# Patient Record
Sex: FEMALE | Race: WHITE | Employment: OTHER | URBAN - METROPOLITAN AREA
[De-identification: names, ages, dates, MRNs, and addresses within clinical notes are randomized per-mention and may not be internally consistent; named-entity substitution may affect disease eponyms.]

---

## 2017-06-08 ENCOUNTER — TRANSFERRED RECORDS (OUTPATIENT)
Dept: HEALTH INFORMATION MANAGEMENT | Facility: CLINIC | Age: 52
End: 2017-06-08

## 2018-01-15 ENCOUNTER — TRANSFERRED RECORDS (OUTPATIENT)
Dept: HEALTH INFORMATION MANAGEMENT | Facility: CLINIC | Age: 53
End: 2018-01-15

## 2018-01-18 ENCOUNTER — TRANSFERRED RECORDS (OUTPATIENT)
Dept: HEALTH INFORMATION MANAGEMENT | Facility: CLINIC | Age: 53
End: 2018-01-18

## 2018-02-23 ENCOUNTER — OFFICE VISIT (OUTPATIENT)
Dept: FAMILY MEDICINE | Facility: CLINIC | Age: 53
End: 2018-02-23
Payer: COMMERCIAL

## 2018-02-23 VITALS
TEMPERATURE: 98.7 F | DIASTOLIC BLOOD PRESSURE: 60 MMHG | HEART RATE: 74 BPM | SYSTOLIC BLOOD PRESSURE: 102 MMHG | BODY MASS INDEX: 27.83 KG/M2 | WEIGHT: 163 LBS | HEIGHT: 64 IN

## 2018-02-23 DIAGNOSIS — G89.29 CHRONIC BILATERAL LOW BACK PAIN WITH RIGHT-SIDED SCIATICA: ICD-10-CM

## 2018-02-23 DIAGNOSIS — G89.4 CHRONIC PAIN SYNDROME: ICD-10-CM

## 2018-02-23 DIAGNOSIS — M54.41 CHRONIC BILATERAL LOW BACK PAIN WITH RIGHT-SIDED SCIATICA: ICD-10-CM

## 2018-02-23 DIAGNOSIS — M54.2 CERVICALGIA: Primary | ICD-10-CM

## 2018-02-23 PROCEDURE — 99205 OFFICE O/P NEW HI 60 MIN: CPT | Performed by: PHYSICIAN ASSISTANT

## 2018-02-23 PROCEDURE — 80306 DRUG TEST PRSMV INSTRMNT: CPT | Performed by: PHYSICIAN ASSISTANT

## 2018-02-23 RX ORDER — AMLODIPINE BESYLATE 10 MG/1
10 TABLET ORAL DAILY
COMMUNITY
Start: 2017-03-09 | End: 2018-03-08

## 2018-02-23 RX ORDER — CYCLOBENZAPRINE HCL 10 MG
10 TABLET ORAL DAILY
COMMUNITY
Start: 2015-04-23 | End: 2018-03-08

## 2018-02-23 RX ORDER — ATORVASTATIN CALCIUM 80 MG/1
80 TABLET, FILM COATED ORAL AT BEDTIME
COMMUNITY
Start: 2017-08-04 | End: 2018-03-08

## 2018-02-23 RX ORDER — ZOLPIDEM TARTRATE 5 MG/1
5 TABLET ORAL AT BEDTIME
Refills: 0 | COMMUNITY
Start: 2018-02-03 | End: 2018-03-08

## 2018-02-23 RX ORDER — METOPROLOL TARTRATE 100 MG
100 TABLET ORAL DAILY
Refills: 0 | COMMUNITY
Start: 2018-02-09 | End: 2018-03-08

## 2018-02-23 RX ORDER — SPIRONOLACTONE 25 MG/1
25 TABLET ORAL DAILY
COMMUNITY
Start: 2017-07-07 | End: 2018-03-08

## 2018-02-23 RX ORDER — NAPROXEN 500 MG/1
500 TABLET ORAL 2 TIMES DAILY
COMMUNITY
Start: 2018-01-05 | End: 2018-03-08

## 2018-02-23 RX ORDER — LOSARTAN POTASSIUM 100 MG/1
100 TABLET ORAL DAILY
COMMUNITY
Start: 2017-08-04 | End: 2018-03-08

## 2018-02-23 RX ORDER — GABAPENTIN 300 MG/1
1200 CAPSULE ORAL 4 TIMES DAILY
COMMUNITY
Start: 2017-09-13 | End: 2018-03-08

## 2018-02-23 RX ORDER — NICOTINE 21 MG/24HR
1 PATCH, TRANSDERMAL 24 HOURS TRANSDERMAL
COMMUNITY
Start: 2016-12-12 | End: 2018-03-08

## 2018-02-23 RX ORDER — OXYCODONE HYDROCHLORIDE 15 MG/1
15 TABLET, FILM COATED, EXTENDED RELEASE ORAL 3 TIMES DAILY
Refills: 0 | COMMUNITY
Start: 2018-02-21 | End: 2018-02-23

## 2018-02-23 RX ORDER — HYDROCODONE BITARTRATE AND ACETAMINOPHEN 10; 325 MG/1; MG/1
10-325 TABLET ORAL
COMMUNITY
Start: 2017-08-25 | End: 2018-02-23

## 2018-02-23 RX ORDER — POLYMYXIN B SULFATE AND TRIMETHOPRIM 1; 10000 MG/ML; [USP'U]/ML
SOLUTION OPHTHALMIC
COMMUNITY
Start: 2017-09-15 | End: 2018-03-08

## 2018-02-23 RX ORDER — DULOXETIN HYDROCHLORIDE 30 MG/1
30 CAPSULE, DELAYED RELEASE ORAL DAILY
COMMUNITY
Start: 2017-07-05 | End: 2018-03-08

## 2018-02-23 RX ORDER — CITALOPRAM HYDROBROMIDE 20 MG/1
20 TABLET ORAL DAILY
COMMUNITY
Start: 2017-07-05 | End: 2018-03-08

## 2018-02-23 RX ORDER — HYDROCODONE BITARTRATE AND ACETAMINOPHEN 10; 325 MG/1; MG/1
1 TABLET ORAL EVERY 4 HOURS PRN
Qty: 90 TABLET | Refills: 0 | Status: SHIPPED | OUTPATIENT
Start: 2018-03-02 | End: 2018-03-08

## 2018-02-23 ASSESSMENT — ANXIETY QUESTIONNAIRES
2. NOT BEING ABLE TO STOP OR CONTROL WORRYING: NOT AT ALL
6. BECOMING EASILY ANNOYED OR IRRITABLE: NOT AT ALL
GAD7 TOTAL SCORE: 3
7. FEELING AFRAID AS IF SOMETHING AWFUL MIGHT HAPPEN: NOT AT ALL
5. BEING SO RESTLESS THAT IT IS HARD TO SIT STILL: SEVERAL DAYS
1. FEELING NERVOUS, ANXIOUS, OR ON EDGE: SEVERAL DAYS
3. WORRYING TOO MUCH ABOUT DIFFERENT THINGS: SEVERAL DAYS

## 2018-02-23 ASSESSMENT — PAIN SCALES - GENERAL: PAINLEVEL: EXTREME PAIN (8)

## 2018-02-23 ASSESSMENT — ENCOUNTER SYMPTOMS
FEVER: 0
WHEEZING: 0
BLURRED VISION: 0
SPUTUM PRODUCTION: 0
SENSORY CHANGE: 0
INSOMNIA: 0
SORE THROAT: 0
WEAKNESS: 0
DIAPHORESIS: 0
NEUROLOGICAL NEGATIVE: 1
NAUSEA: 0
CONSTIPATION: 0
DOUBLE VISION: 0
PHOTOPHOBIA: 0
HEADACHES: 0
HEARTBURN: 0
TINGLING: 0
FREQUENCY: 0
HEMOPTYSIS: 0
DIARRHEA: 0
VOMITING: 0
SEIZURES: 0
LOSS OF CONSCIOUSNESS: 0
DEPRESSION: 0
MYALGIAS: 1
BACK PAIN: 1
BLOOD IN STOOL: 0
FOCAL WEAKNESS: 0
NECK PAIN: 1
HALLUCINATIONS: 0
EYE DISCHARGE: 0
NERVOUS/ANXIOUS: 0
EYE PAIN: 0
WEIGHT LOSS: 0
DYSURIA: 0
DIZZINESS: 0
PALPITATIONS: 0
ORTHOPNEA: 0
ABDOMINAL PAIN: 0
EYE REDNESS: 0
SHORTNESS OF BREATH: 0
COUGH: 0

## 2018-02-23 ASSESSMENT — PATIENT HEALTH QUESTIONNAIRE - PHQ9: 5. POOR APPETITE OR OVEREATING: NOT AT ALL

## 2018-02-23 ASSESSMENT — LIFESTYLE VARIABLES: SUBSTANCE_ABUSE: 0

## 2018-02-23 NOTE — MR AVS SNAPSHOT
After Visit Summary   2/23/2018    Anuradha Gray    MRN: [id number]           Patient Information     Date Of Birth          1965        Visit Information        Provider Department      2/23/2018 1:20 PM Adam Mcdonald PA-C [facility]        Today's Diagnoses     Cervicalgia    -  1    Chronic bilateral low back pain with right-sided sciatica        Chronic pain syndrome           Follow-ups after your visit        Additional Services     PAIN MANAGEMENT REFERRAL       Your provider has referred you to: [facility]: [facility] -    Reason for Referral: Evaluation for comprehensive services- patients will be evaluated if appropriate for comprehensive service including medication changes, procedures, pain psychology, and pain physical therapy.  While involved with comprehensive services, pain providers will work with referring provider/PCP to stabilize appropriate medication management, with long-term plan of transition of prescribing back to referring provider/PCP upon completion of comprehensive services.      Please complete the following questions:    Do you have any specific questions for the pain specialist? No    Are there any red flags that may impact the assessment or management of the patient?       What is your diagnosis for the patient's pain? Neck Pain      For any questions, contact the [facility] at [phone].     **ANY DIAGNOSTIC TESTS THAT ARE NOT IN EPIC SHOULD BE SENT TO THE PAIN CENTER**    REGARDING OPIOID MEDICATIONS:  The discussion of opioids management, appropriateness of therapy, and dosing will be discussed in patients being seen for evaluation.  The pain management clinics are not long-term prescribing clinics, with transition of prescribing of medications ultimately going back to the referring provider/PCP.  If prescribing is taken over at the pain clinic, it is in actively involved patients whom are  appropriate for opioids, urine drug screening is completed, and long-term prescribing plan has been determined.  Therefore, we will not be automatically taking over prescribing at the patient's first visit.  Is this agreeable to you? agrees.     Please be aware that coverage of these services is subject to the terms and limitations of your health insurance plan.  Call member services at your health plan with any benefit or coverage questions.      Please bring the following with you to your appointment:    (1) Any X-Rays, CTs or MRIs which have been performed.  Contact the facility where they were done to arrange for  prior to your scheduled appointment.    (2) List of current medications   (3) This referral request   (4) Any documents/labs given to you for this referral                  Follow-up notes from your care team     Return in about 3 weeks (around 3/16/2018).      Your next 10 appointments already scheduled     Mar 08, 2018  1:20 PM CST   SHORT with Adam Mcdonald PA-C   [facility] ([facility]    [address] [place] [id number] [phone]              Who to contact     If you have questions or need follow up information about today's clinic visit or your schedule please contact [facility] directly at [phone].  Normal or non-critical lab and imaging results will be communicated to you by MyChart, letter or phone within 4 business days after the clinic has received the results. If you do not hear from us within 7 days, please contact the clinic through MyChart or phone. If you have a critical or abnormal lab result, we will notify you by phone as soon as possible.  Submit refill requests through [employer] or call your pharmacy and they will forward the refill request to us. Please allow 3 business days for your refill to be completed.          Additional Information About Your Visit        MyChart Information     [employer]  "lets you send messages to your doctor, view your test results, renew your prescriptions, schedule appointments and more. To sign up, go to [email or web address] . Click on \"Log in\" on the left side of the screen, which will take you to the Welcome page. Then click on \"Sign up Now\" on the right side of the page.     You will be asked to enter the access code listed below, as well as some personal information. Please follow the directions to create your username and password.     Your access code is: [email or web address]  Expires: 2018  3:07 PM     Your access code will  in 90 days. If you need help or a new code, please call your [place] clinic or [phone].        Care EveryWhere ID     This is your Care EveryWhere ID. This could be used by other organizations to access your [place] medical records  [id number]        Your Vitals Were     Pulse Temperature Height BMI (Body Mass Index)          74 98.7  F (37.1  C) (Tympanic) 5' 3.5\" (1.613 m) 28.42 kg/m2         Blood Pressure from Last 3 Encounters:   18 102/60    Weight from Last 3 Encounters:   18 163 lb (73.9 kg)              We Performed the Following     Drug Abuse Screen Panel 13, Urine (Pain Care Package)     PAIN MANAGEMENT REFERRAL          Today's Medication Changes          These changes are accurate as of 18  3:07 PM.  If you have any questions, ask your nurse or doctor.               These medicines have changed or have updated prescriptions.        Dose/Directions    HYDROcodone-acetaminophen  MG per tablet   Commonly known as:  NORCO   This may have changed:    - how much to take  - when to take this  - reasons to take this   Used for:  Cervicalgia, Chronic bilateral low back pain with right-sided sciatica, Chronic pain syndrome   Changed by:  Adam Mcdonald PA-C        Dose:  1 tablet   Start taking on:  3/2/2018   Take 1 tablet by mouth every 4 hours as needed for moderate to severe pain   Quantity:  90 tablet "   Refills:  0            Where to get your medicines      Some of these will need a paper prescription and others can be bought over the counter.  Ask your nurse if you have questions.     Bring a paper prescription for each of these medications     HYDROcodone-acetaminophen  MG per tablet                Primary Care Provider Office Phone # Fax #    Adam Mcdonald PA-C [phone] [address] [place] [id number]        Equal Access to Services     ANNABELLE HATCH : Hadii aad ku hadasho Soomaali, waaxda luqadaha, qaybta kaalmada adeegyada, waxay idiin hayaan adeeg kharash lalenny . So [facility] [phone].    ATENCIÓN: Si habla español, tiene a hollingsworth disposición servicios gratuitos de asistencia lingüística. Llame al [phone].    We comply with applicable federal civil rights laws and [place] laws. We do not discriminate on the basis of race, color, national origin, age, disability, sex, sexual orientation, or gender identity.            Thank you!     Thank you for choosing [facility]  for your care. Our goal is always to provide you with excellent care. Hearing back from our patients is one way we can continue to improve our services. Please take a few minutes to complete the written survey that you may receive in the mail after your visit with us. Thank you!             Your Updated Medication List - Protect others around you: Learn how to safely use, store and throw away your medicines at www.disposemymeds.org.          This list is accurate as of 2/23/18  3:07 PM.  Always use your most recent med list.                   Brand Name Dispense Instructions for use Diagnosis    amLODIPine 10 MG tablet    NORVASC     Take 10 mg by mouth daily        atorvastatin 80 MG tablet    LIPITOR     Take 80 mg by mouth At Bedtime        citalopram 20 MG tablet    celeXA     Take 20 mg by mouth daily        cyclobenzaprine 10 MG tablet    FLEXERIL     Take 10 mg by mouth daily         diclofenac 1 % Gel topical gel    VOLTAREN     APPLY TOPCIALLY TO AFFECTED AREA(S) TWICE DAILY        DULoxetine 30 MG EC capsule    CYMBALTA     Take 30 mg by mouth daily        fa-pyridoxine-cyancobalamin 2.5-25-2 MG Tabs per tablet      Take 1 tablet by mouth daily        gabapentin 300 MG capsule    NEURONTIN     Take 1,200 mg by mouth 4 times daily        HYDROcodone-acetaminophen  MG per tablet   Start taking on:  3/2/2018    NORCO    90 tablet    Take 1 tablet by mouth every 4 hours as needed for moderate to severe pain    Cervicalgia, Chronic bilateral low back pain with right-sided sciatica, Chronic pain syndrome       losartan 100 MG tablet    COZAAR     Take 100 mg by mouth daily        metoprolol tartrate 100 MG tablet    LOPRESSOR     Take 100 mg by mouth daily        naproxen 500 MG tablet    NAPROSYN     Take 500 mg by mouth 2 times daily        nicotine 21 MG/24HR 24 hr patch    NICODERM CQ     1 patch        omeprazole 20 MG CR capsule    priLOSEC     Take 20 mg by mouth daily        Quad Cane Misc      Single Point Cane for home and community use.        RA VITAMIN B-12 TR 1000 MCG Tbcr   Generic drug:  cyanocobalamin      Take 1,000 mcg by mouth daily        spironolactone 25 MG tablet    ALDACTONE     Take 25 mg by mouth daily Take 2 tablets by mouth every morning        trimethoprim-polymyxin b ophthalmic solution    POLYTRIM     INSTILL 1 DROP IN RIGHT EYE EVERY 4 HOURS        zolpidem 5 MG tablet    AMBIEN     Take 5 mg by mouth At Bedtime

## 2018-02-23 NOTE — NURSING NOTE
"Chief Complaint   Patient presents with     New Patient     Pain       Initial /60 (BP Location: Left arm, Patient Position: Sitting, Cuff Size: Adult Regular)  Pulse 74  Temp 98.7  F (37.1  C) (Tympanic)  Ht 5' 3.5\" (1.613 m)  Wt 163 lb (73.9 kg)  BMI 28.42 kg/m2 Estimated body mass index is 28.42 kg/(m^2) as calculated from the following:    Height as of this encounter: 5' 3.5\" (1.613 m).    Weight as of this encounter: 163 lb (73.9 kg).      Health Maintenance that is potentially due pending provider review:  Mammogram, Pap Smear and Colonoscopy/FIT    Will get records and address at next visit.    Is there anyone who you would like to be able to receive your results? No  If yes have patient fill out MINGO    Mica VELASCO CMA    "

## 2018-02-23 NOTE — PROGRESS NOTES
HPI    SUBJECTIVE:   Anuradha Gray is a 52 year old female who presents to clinic today for initial consultation regarding chronic pain.  She has been to a couple of different pain clinics as well as multiple neurosurgery procedures performed and neurology visits.  At age 17 she had scoliosis surgery with Bales rods and had subsequent back and neck problems years after that.  She has had lumbar fusions performed because of ongoing back problems.  She has recently been treated at a pain clinic but unfortunately the doctor that she was seeing no longer practices.  She would like to transfer her cares to this clinic.  I reviewed her past medical records in care everywhere and there is no problems seen such as overuse of medication or other issues.  She has followed through with physical therapy and other treatments in the past.  We had a very lengthy discussion regarding pain management and she would love to get off opiate pain medications if that would work for her.  We discussed the risk of opioid-induced hyperalgesia as well as the strong possibility that that is when she has developed because of the length of time that she has been on pain medications.  She recently was started on OxyContin and she states that this is not helping her pain levels and she would like to discontinue this medication.  She has been on hydrocodone 10/325 for many years and was taking 8 tablets per day but was recently decreased to 6 tablets per day and now 5 tablets per day and they added OxyContin 15 mg 3 times daily.  As I mentioned, she does not think the OxyContin has been beneficial and she would like to discontinue that medication.    New Patient/Transfer of Care  Chronic Pain Initial Visit       Pain location: Spine, Legs, Hips, Arm,and Neck   Pain onset: Ongoing 13 years  Pain is described as Miserable   Pain rating: intensity ranges from 6/10 to 10/10, and Averages 8/10 on a 0-10 scale.  Aggravating factors include:  Continuous Activity, Bending    Relieving factors include: Exercise, Staying Active   Activity level/function:              Daily activities:  Unable to perform most daily activities - chores, hobbies, social activities, driving            Work:  Unable to work  Recent family or social stressors:  none noted    Past pain treatments:   Pain Clinic:  Yes [facility]     PT:  Yes many times   Psychologist:  No  Relaxation techniques/biofeedback:  Yes everyday  Chiropractor:  Yes   Acupuncture:  No  TENs Unit:  Yes   Injections:  Yes   Self-care:  Yes       Previous medication treatments:  Opiates - hydrocodone (Vicodin/Norco), morphine IR, morphine ER (MSContin, Sakina, Avinza, Oromorph), oxycodone IR (Percocet) and oxycodone ER (Oxycontin)  Antiepileptics - none  Antidepressants - amytriptyline, escitalopram (Lexapro), fluoxetine (Prozac) and sertraline (Zoloft)   NSAIDs - diclofenac (Cataflam, Voltaren), ibuprofen (Motrin), ketorolac (Toradol) and naproxen (Anaprox, Naprosyn, Naprelan)  Muscle relaxants - carisoprodol (Soma) and methocarbamol (Robaxin)  Topicals - none    History of chemical dependency:  No  Sleep:  Fair  Depression/mood issues:  No  Anxiety issues:  No      DIRE Total Score(s)  No flowsheet data found.      Amount of exercise or physical activity: None    Problems taking medications regularly: No    Medication side effects: none    Diet: regular (no restrictions)    Problem list and histories reviewed & adjusted, as indicated.  Additional history: as documented    Patient Active Problem List   Diagnosis     Chronic pain     History reviewed. No pertinent surgical history.    Social History   Substance Use Topics     Smoking status: Current Every Day Smoker     Smokeless tobacco: Never Used     Alcohol use Not on file     History reviewed. No pertinent family history.      Current Outpatient Prescriptions   Medication Sig Dispense Refill     fa-pyridoxine-cyancobalamin 2.5-25-2 MG TABS per  tablet Take 1 tablet by mouth daily       spironolactone (ALDACTONE) 25 MG tablet Take 25 mg by mouth daily Take 2 tablets by mouth every morning       Misc. Devices (QUAD CANE) MISC Single Point Cane for home and community use.       metoprolol tartrate (LOPRESSOR) 100 MG tablet Take 100 mg by mouth daily  0     zolpidem (AMBIEN) 5 MG tablet Take 5 mg by mouth At Bedtime  0     losartan (COZAAR) 100 MG tablet Take 100 mg by mouth daily       atorvastatin (LIPITOR) 80 MG tablet Take 80 mg by mouth At Bedtime       DULoxetine (CYMBALTA) 30 MG EC capsule Take 30 mg by mouth daily       citalopram (CELEXA) 20 MG tablet Take 20 mg by mouth daily       cyanocobalamin (RA VITAMIN B-12 TR) 1000 MCG TBCR Take 1,000 mcg by mouth daily       naproxen (NAPROSYN) 500 MG tablet Take 500 mg by mouth 2 times daily       amLODIPine (NORVASC) 10 MG tablet Take 10 mg by mouth daily       cyclobenzaprine (FLEXERIL) 10 MG tablet Take 10 mg by mouth daily       diclofenac (VOLTAREN) 1 % GEL topical gel APPLY TOPCIALLY TO AFFECTED AREA(S) TWICE DAILY       gabapentin (NEURONTIN) 300 MG capsule Take 1,200 mg by mouth 4 times daily       nicotine (NICODERM CQ) 21 MG/24HR 24 hr patch 1 patch       omeprazole (PRILOSEC) 20 MG CR capsule Take 20 mg by mouth daily       [START ON 3/2/2018] HYDROcodone-acetaminophen (NORCO)  MG per tablet Take 1 tablet by mouth every 4 hours as needed for moderate to severe pain 90 tablet 0     trimethoprim-polymyxin b (POLYTRIM) ophthalmic solution INSTILL 1 DROP IN RIGHT EYE EVERY 4 HOURS       Allergies   Allergen Reactions     Lisinopril Cough     Labs reviewed in EPIC    Reviewed and updated as needed this visit by clinical staff  Tobacco  Med Hx  Surg Hx  Fam Hx  Soc Hx      Reviewed and updated as needed this visit by Provider       Review of Systems   Constitutional: Negative for diaphoresis, fever, malaise/fatigue and weight loss.   HENT: Negative for congestion, ear discharge, ear pain,  hearing loss, nosebleeds and sore throat.    Eyes: Negative for blurred vision, double vision, photophobia, pain, discharge and redness.   Respiratory: Negative for cough, hemoptysis, sputum production, shortness of breath and wheezing.    Cardiovascular: Negative for chest pain, palpitations, orthopnea and leg swelling.   Gastrointestinal: Negative for abdominal pain, blood in stool, constipation, diarrhea, heartburn, melena, nausea and vomiting.   Genitourinary: Negative.  Negative for dysuria, frequency and urgency.   Musculoskeletal: Positive for back pain, myalgias and neck pain. Negative for joint pain.   Skin: Negative for itching and rash.   Neurological: Negative.  Negative for dizziness, tingling, sensory change, focal weakness, seizures, loss of consciousness, weakness and headaches.   Endo/Heme/Allergies: Negative.    Psychiatric/Behavioral: Negative for depression, hallucinations, substance abuse and suicidal ideas. The patient is not nervous/anxious and does not have insomnia.          Physical Exam   Constitutional: She is oriented to person, place, and time and well-developed, well-nourished, and in no distress. No distress.   HENT:   Head: Normocephalic and atraumatic.   Right Ear: External ear normal.   Left Ear: External ear normal.   Nose: Nose normal.   Eyes: Conjunctivae and EOM are normal. Pupils are equal, round, and reactive to light. Right eye exhibits no discharge. Left eye exhibits no discharge. No scleral icterus.   Neck: Normal range of motion. Neck supple. No JVD present. No tracheal deviation present. No thyromegaly present.   Cardiovascular: Normal rate, regular rhythm, normal heart sounds and intact distal pulses.  Exam reveals no gallop and no friction rub.    No murmur heard.  Pulmonary/Chest: Effort normal and breath sounds normal. No stridor. No respiratory distress. She has no wheezes. She has no rales. She exhibits no tenderness.   Abdominal: Soft. Bowel sounds are normal. She  exhibits no distension and no mass. There is no tenderness. There is no rebound and no guarding.   Musculoskeletal: She exhibits no edema.        Cervical back: She exhibits decreased range of motion, tenderness, pain and spasm.        Lumbar back: She exhibits decreased range of motion, tenderness, pain and spasm.   Lymphadenopathy:     She has no cervical adenopathy.   Neurological: She is alert and oriented to person, place, and time. She has normal reflexes. No cranial nerve deficit. She exhibits normal muscle tone. Gait normal.   Skin: Skin is warm and dry. No rash noted. She is not diaphoretic. No erythema. No pallor.   Psychiatric: Mood, memory, affect and judgment normal.       (M54.2) Cervicalgia  (primary encounter diagnosis)  Comment:   Plan: PAIN MANAGEMENT REFERRAL,         HYDROcodone-acetaminophen (NORCO)  MG per        tablet            (M54.41,  G89.29) Chronic bilateral low back pain with right-sided sciatica  Comment:   Plan: PAIN MANAGEMENT REFERRAL,         HYDROcodone-acetaminophen (NORCO)  MG per        tablet            (G89.4) Chronic pain syndrome  Comment:   Plan: PAIN MANAGEMENT REFERRAL,         HYDROcodone-acetaminophen (NORCO)  MG per        tablet, Drug Abuse Screen Panel 13, Urine (Pain        Care Package)          65 minutes was spent with patient face to face with greater than 50% of this time spent counseling patient regarding current treatment options etc.    Controlled substance treatment agreement was agreed upon today  Urine drug screen performed today  Referral to pain clinic  Prescription for hydrocodone to be filled next week  Follow-up in 3 weeks

## 2018-02-23 NOTE — LETTER
[facility]    02/23/18    Patient: Anuradha Gray  YOB: 1965  Medical Record Number: [id number]                                                                  Controlled Substance Agreement  I understand that my care provider has prescribed controlled substances (narcotics, tranquilizers, and/or stimulants) to help manage my condition(s).  I am taking this medicine to help me function or work.  I know that this is strong medicine.  It could have serious side effects and even cause a dependency on the drug.  If I stop these medicines suddenly, I could have severe withdrawal symptoms.    The risks, benefits, and side effects of these medication(s) were explained to me.  I agree that:  1. I will take part in other treatments as advised by my provider.  This may be psychiatry or counseling, physical therapy, behavioral therapy, group treatment, or a referral to a pain clinic.  I will reduce or stop my medicine when my provider tells me to do so.   2. I will take my medicines as prescribed.  I will not change the dose or schedule unless my provider tells me to.  There will be no refills if I  run out early.   I may be contacted at any time without warning and asked to complete a drug test or pill count.   3. I will keep all my appointments at the clinic.  If I miss appointments or fail to follow instructions, my provider may stop my medicine.  4. I will not ask other providers to prescribe controlled substances. And I will not accept controlled substances from other people. If I need another prescribed controlled substance for a new reason, I will notify my provider within one business day.  5. If I enroll in the [place] Medical Marijuana program, I will tell my provider.  I will also sign an agreement to share my medical records with my provider.  6. I will use one pharmacy to fill all of my controlled substance prescriptions.  If my prescription is mailed to my pharmacy, it may take  5 to 7 days for my medicine to be ready.  7. I understand that my provider, clinic care team, and pharmacy can track controlled substance prescriptions from other providers through a central database (prescription monitoring program).  8. I will bring in my list of medications (or my medicine bottles) each time I come to the clinic.  REV- 04/2016                                                                                                                                            Page 1 of 2      [facility]    02/23/18    Patient: Anuradha Gray  YOB: 1965  Medical Record Number: [id number]    9. Refills of controlled substances will be made only during office hours.  It is up to me to make sure that I do not run out of my medicines on weekends or holidays.    10. I am responsible for my prescriptions.  If the medicine is lost or stolen, it will not be replaced.   I also agree not to share these medicines with anyone.  11. I agree to not use ANY illegal or recreational drugs.  This includes marijuana, cocaine, bath salts or other drugs.  I agree not to use alcohol unless my provider says I may.  I agree to give urine samples whenever asked.  If I fail to give a urine sample, the provider may stop my medicine.     12. I will tell my nurse or provider right away if I become pregnant or have a new medical problem treated outside of [facility].  13. I understand that this medicine can affect my thinking and judgment.  It may be unsafe for me to drive, use machinery and do dangerous tasks.  I will not do any of these things until I know how the medicine affects me.  If my dose changes, I will wait to see how it affects me.  I will contact my provider if I have concerns about medicine side effects.  I understand that if I do not follow any of the conditions above, my prescriptions or treatment may be stopped.    I agree that my provider, clinic care team, and pharmacy may work with  any city, state or federal law enforcement agency that investigates the misuse, sale, or other diversion of my controlled medicine. I will allow my provider to discuss my care with or share a copy of this agreement with any other treating provider, pharmacy or emergency room where I receive care.  I agree to give up (waive) any right of privacy or confidentiality with respect to these authorizations.   I have read this agreement and have asked questions about anything I did not understand.   ___________________________________    ___________________________  Patient Signature                                                           Date and Time  ___________________________________     ____________________________  Witness                                                                            Date and Time  ___________________________________  Adam Mcdonald PA-C  REV-  04/2016                                                                                                                                                                 Page 2 of 2

## 2018-02-24 ASSESSMENT — ANXIETY QUESTIONNAIRES: GAD7 TOTAL SCORE: 3

## 2018-02-24 ASSESSMENT — PATIENT HEALTH QUESTIONNAIRE - PHQ9: SUM OF ALL RESPONSES TO PHQ QUESTIONS 1-9: 4

## 2018-02-25 ENCOUNTER — HEALTH MAINTENANCE LETTER (OUTPATIENT)
Age: 53
End: 2018-02-25

## 2018-02-26 LAB
AMPHETAMINES UR QL: NOT DETECTED NG/ML
BARBITURATES UR QL SCN: NOT DETECTED NG/ML
BENZODIAZ UR QL SCN: NOT DETECTED NG/ML
BUPRENORPHINE UR QL: NOT DETECTED NG/ML
CANNABINOIDS UR QL: NOT DETECTED NG/ML
COCAINE UR QL SCN: NOT DETECTED NG/ML
D-METHAMPHET UR QL: NOT DETECTED NG/ML
METHADONE UR QL SCN: NOT DETECTED NG/ML
OPIATES UR QL SCN: NOT DETECTED NG/ML
OXYCODONE UR QL SCN: NOT DETECTED NG/ML
PCP UR QL SCN: NOT DETECTED NG/ML
PROPOXYPH UR QL: NOT DETECTED NG/ML
TRICYCLICS UR QL SCN: NOT DETECTED NG/ML

## 2018-03-02 ENCOUNTER — TELEPHONE (OUTPATIENT)
Dept: FAMILY MEDICINE | Facility: CLINIC | Age: 53
End: 2018-03-02

## 2018-03-02 NOTE — TELEPHONE ENCOUNTER
Pt was on 2 oxycodone a day. Then upped to 3 a day. Per the Pain clinic.Pt would like to continue to take 2- Oxycontin a day and then take 6 norco a day as well. Advised not to make any changes to her pain medications until her appointment with Keon Mcdonald. Pt ok to wait. Also please see Drug Screen lab-Pt was negative for all medications-none detected. Mary Jones RN

## 2018-03-02 NOTE — TELEPHONE ENCOUNTER
Reason for call:  Symptom   Symptom or request: medication question about her quantity.    Duration (how long have symptoms been present): saw Keon Mcdonald one week ago and he changed her medication from her pain clinic.  Have you been treated for this before? Yes    Additional comments: She takes Norco and Oxy.  She wants to change how she takes it.  Please advise.    Phone number to reach patient:  Home number on file [phone] (home)    Best Time:  any    Can we leave a detailed message on this number?  YES

## 2018-03-08 ENCOUNTER — OFFICE VISIT (OUTPATIENT)
Dept: FAMILY MEDICINE | Facility: CLINIC | Age: 53
End: 2018-03-08
Payer: COMMERCIAL

## 2018-03-08 VITALS
TEMPERATURE: 97.6 F | DIASTOLIC BLOOD PRESSURE: 76 MMHG | BODY MASS INDEX: 28.84 KG/M2 | WEIGHT: 165.4 LBS | SYSTOLIC BLOOD PRESSURE: 122 MMHG | HEART RATE: 64 BPM

## 2018-03-08 DIAGNOSIS — F17.200 TOBACCO DEPENDENCE SYNDROME: ICD-10-CM

## 2018-03-08 DIAGNOSIS — Z12.31 ENCOUNTER FOR SCREENING MAMMOGRAM FOR BREAST CANCER: ICD-10-CM

## 2018-03-08 DIAGNOSIS — F32.A DEPRESSION, UNSPECIFIED DEPRESSION TYPE: ICD-10-CM

## 2018-03-08 DIAGNOSIS — M54.2 CERVICALGIA: ICD-10-CM

## 2018-03-08 DIAGNOSIS — G89.4 CHRONIC PAIN SYNDROME: Primary | ICD-10-CM

## 2018-03-08 DIAGNOSIS — H10.13 ALLERGIC CONJUNCTIVITIS, BILATERAL: ICD-10-CM

## 2018-03-08 DIAGNOSIS — M54.41 CHRONIC BILATERAL LOW BACK PAIN WITH RIGHT-SIDED SCIATICA: ICD-10-CM

## 2018-03-08 DIAGNOSIS — Z12.11 SPECIAL SCREENING FOR MALIGNANT NEOPLASMS, COLON: ICD-10-CM

## 2018-03-08 DIAGNOSIS — G89.29 CHRONIC BILATERAL LOW BACK PAIN WITH RIGHT-SIDED SCIATICA: ICD-10-CM

## 2018-03-08 DIAGNOSIS — I10 ESSENTIAL HYPERTENSION: ICD-10-CM

## 2018-03-08 DIAGNOSIS — K21.9 GASTROESOPHAGEAL REFLUX DISEASE WITHOUT ESOPHAGITIS: ICD-10-CM

## 2018-03-08 DIAGNOSIS — F51.01 PRIMARY INSOMNIA: ICD-10-CM

## 2018-03-08 PROCEDURE — 80361 OPIATES 1 OR MORE: CPT | Mod: 90 | Performed by: PHYSICIAN ASSISTANT

## 2018-03-08 PROCEDURE — 99215 OFFICE O/P EST HI 40 MIN: CPT | Performed by: PHYSICIAN ASSISTANT

## 2018-03-08 PROCEDURE — 99000 SPECIMEN HANDLING OFFICE-LAB: CPT | Performed by: PHYSICIAN ASSISTANT

## 2018-03-08 PROCEDURE — 80306 DRUG TEST PRSMV INSTRMNT: CPT | Performed by: PHYSICIAN ASSISTANT

## 2018-03-08 RX ORDER — HYDROCODONE BITARTRATE AND ACETAMINOPHEN 10; 325 MG/1; MG/1
1 TABLET ORAL EVERY 4 HOURS PRN
Qty: 90 TABLET | Refills: 0 | Status: SHIPPED | OUTPATIENT
Start: 2018-03-08 | End: 2018-03-08

## 2018-03-08 RX ORDER — GABAPENTIN 300 MG/1
1200 CAPSULE ORAL 3 TIMES DAILY
Qty: 360 CAPSULE | Refills: 3 | Status: SHIPPED | OUTPATIENT
Start: 2018-03-08 | End: 2018-07-26

## 2018-03-08 RX ORDER — CYCLOBENZAPRINE HCL 10 MG
10 TABLET ORAL DAILY
Qty: 42 TABLET | Refills: 3 | Status: SHIPPED | OUTPATIENT
Start: 2018-03-08 | End: 2018-11-01

## 2018-03-08 RX ORDER — SPIRONOLACTONE 25 MG/1
25 TABLET ORAL DAILY
Qty: 30 TABLET | Refills: 3 | Status: SHIPPED | OUTPATIENT
Start: 2018-03-08 | End: 2018-03-08

## 2018-03-08 RX ORDER — CITALOPRAM HYDROBROMIDE 20 MG/1
20 TABLET ORAL DAILY
Qty: 60 TABLET | Refills: 3 | Status: SHIPPED | OUTPATIENT
Start: 2018-03-08 | End: 2018-11-27

## 2018-03-08 RX ORDER — METOPROLOL TARTRATE 100 MG
100 TABLET ORAL DAILY
Qty: 60 TABLET | Refills: 0 | Status: SHIPPED | OUTPATIENT
Start: 2018-03-08 | End: 2018-05-08

## 2018-03-08 RX ORDER — ATORVASTATIN CALCIUM 80 MG/1
80 TABLET, FILM COATED ORAL AT BEDTIME
Qty: 30 TABLET | Refills: 3 | Status: SHIPPED | OUTPATIENT
Start: 2018-03-08 | End: 2019-03-07

## 2018-03-08 RX ORDER — LOSARTAN POTASSIUM 100 MG/1
100 TABLET ORAL DAILY
Qty: 30 TABLET | Refills: 3 | Status: SHIPPED | OUTPATIENT
Start: 2018-03-08 | End: 2018-07-05

## 2018-03-08 RX ORDER — DULOXETIN HYDROCHLORIDE 60 MG/1
60 CAPSULE, DELAYED RELEASE ORAL DAILY
Qty: 30 CAPSULE | Refills: 1 | Status: SHIPPED | OUTPATIENT
Start: 2018-03-08 | End: 2018-05-08

## 2018-03-08 RX ORDER — DULOXETIN HYDROCHLORIDE 30 MG/1
30 CAPSULE, DELAYED RELEASE ORAL DAILY
Qty: 60 CAPSULE | Refills: 3 | Status: SHIPPED | OUTPATIENT
Start: 2018-03-08 | End: 2018-03-08 | Stop reason: DRUGHIGH

## 2018-03-08 RX ORDER — AMLODIPINE BESYLATE 10 MG/1
10 TABLET ORAL DAILY
Qty: 30 TABLET | Refills: 3 | Status: SHIPPED | OUTPATIENT
Start: 2018-03-08 | End: 2018-07-16

## 2018-03-08 RX ORDER — ZOLPIDEM TARTRATE 5 MG/1
5 TABLET ORAL AT BEDTIME
Qty: 60 TABLET | Refills: 0 | Status: SHIPPED | OUTPATIENT
Start: 2018-03-08 | End: 2018-05-08

## 2018-03-08 RX ORDER — NICOTINE 21 MG/24HR
1 PATCH, TRANSDERMAL 24 HOURS TRANSDERMAL EVERY 24 HOURS
Qty: 30 PATCH | Refills: 3 | Status: SHIPPED | OUTPATIENT
Start: 2018-03-08

## 2018-03-08 RX ORDER — POLYMYXIN B SULFATE AND TRIMETHOPRIM 1; 10000 MG/ML; [USP'U]/ML
1 SOLUTION OPHTHALMIC 4 TIMES DAILY
Qty: 1 BOTTLE | Refills: 3 | Status: SHIPPED | OUTPATIENT
Start: 2018-03-08 | End: 2018-07-26

## 2018-03-08 RX ORDER — ZOLPIDEM TARTRATE 5 MG/1
5 TABLET ORAL AT BEDTIME
Qty: 60 TABLET | Refills: 0 | Status: SHIPPED | OUTPATIENT
Start: 2018-03-08 | End: 2018-03-08

## 2018-03-08 RX ORDER — SPIRONOLACTONE 25 MG/1
25 TABLET ORAL DAILY
Qty: 30 TABLET | Refills: 3 | Status: SHIPPED | OUTPATIENT
Start: 2018-03-08 | End: 2018-07-05

## 2018-03-08 RX ORDER — HYDROCODONE BITARTRATE AND ACETAMINOPHEN 10; 325 MG/1; MG/1
1 TABLET ORAL EVERY 4 HOURS PRN
Qty: 180 TABLET | Refills: 0 | Status: SHIPPED | OUTPATIENT
Start: 2018-03-08 | End: 2018-03-28

## 2018-03-08 RX ORDER — NAPROXEN 500 MG/1
500 TABLET ORAL 2 TIMES DAILY
Qty: 60 TABLET | Refills: 3 | Status: SHIPPED | OUTPATIENT
Start: 2018-03-08 | End: 2018-09-01

## 2018-03-08 ASSESSMENT — ENCOUNTER SYMPTOMS
DIZZINESS: 0
EYE DISCHARGE: 0
EYE PAIN: 0
PALPITATIONS: 0
SORE THROAT: 0
HALLUCINATIONS: 0
SPUTUM PRODUCTION: 0
COUGH: 0
TINGLING: 0
WEAKNESS: 0
HEARTBURN: 0
HEADACHES: 0
HEMOPTYSIS: 0
FOCAL WEAKNESS: 0
SEIZURES: 0
EYE REDNESS: 0
MYALGIAS: 1
NAUSEA: 0
VOMITING: 0
SENSORY CHANGE: 0
FEVER: 0
CONSTIPATION: 0
DYSURIA: 0
DIAPHORESIS: 0
ORTHOPNEA: 0
BLURRED VISION: 0
BLOOD IN STOOL: 0
NECK PAIN: 1
LOSS OF CONSCIOUSNESS: 0
DOUBLE VISION: 0
WEIGHT LOSS: 0
NEUROLOGICAL NEGATIVE: 1
ABDOMINAL PAIN: 0
DEPRESSION: 0
INSOMNIA: 0
WHEEZING: 0
DIARRHEA: 0
BACK PAIN: 1
PHOTOPHOBIA: 0
NERVOUS/ANXIOUS: 0
SHORTNESS OF BREATH: 0
FREQUENCY: 0

## 2018-03-08 ASSESSMENT — LIFESTYLE VARIABLES: SUBSTANCE_ABUSE: 0

## 2018-03-08 ASSESSMENT — PAIN SCALES - GENERAL: PAINLEVEL: EXTREME PAIN (8)

## 2018-03-08 NOTE — TELEPHONE ENCOUNTER
I will absolutely not prescribe OxyContin and she needs to see the pain clinic.  There were none of her medications present in her urine drug screen which implies overuse or other problems with medication.  She will need to be seen to discuss this and opiate pain medications are unlikely to be prescribed by me without a new pain clinic evaluation

## 2018-03-08 NOTE — PROGRESS NOTES
HPI    SUBJECTIVE:   Anuradha Gray is a 53 year old female who presents to clinic today for follow-up of her chronic pain management related to her multiple medical issues.  She has neck and back problems.  She has seen a spine specialist and has had multiple injections.  She has been on many different medications in the past and we are recently decreasing and subsequently discontinuing the OxyContin that was recently started adding a pain clinic.  She is currently taking hydrocodone and we discussed the fact that her last urine drug screen did not have any opiate pain medication present.  She states that she had the flu and cannot explain why there was no medication present because she had been taking her medication but does not take it when she drives to appointments.  She has not scheduled an appointment with the pain clinic through [place] yet because they have not called her back.      2 week follow up-Per [place]  Discuss results of urine drug screen  -Patient would like to discuss menopause  -Family history of bi-polar discuss mood swings     Problem list and histories reviewed & adjusted, as indicated.  Additional history: as documented    Patient Active Problem List   Diagnosis     Chronic pain     No past surgical history on file.    Social History   Substance Use Topics     Smoking status: Current Every Day Smoker     Smokeless tobacco: Never Used     Alcohol use Not on file     No family history on file.      Current Outpatient Prescriptions   Medication Sig Dispense Refill     fa-pyridoxine-cyancobalamin 2.5-25-2 MG TABS per tablet Take 1 tablet by mouth daily 30 each 3     metoprolol tartrate (LOPRESSOR) 100 MG tablet Take 1 tablet (100 mg) by mouth daily 60 tablet 0     losartan (COZAAR) 100 MG tablet Take 1 tablet (100 mg) by mouth daily 30 tablet 3     atorvastatin (LIPITOR) 80 MG tablet Take 1 tablet (80 mg) by mouth At Bedtime 30 tablet 3     citalopram (CELEXA) 20 MG tablet Take 1 tablet (20 mg)  by mouth daily 60 tablet 3     cyanocobalamin (RA VITAMIN B-12 TR) 1000 MCG TBCR Take 1,000 mcg by mouth daily 30 tablet 3     naproxen (NAPROSYN) 500 MG tablet Take 1 tablet (500 mg) by mouth 2 times daily 60 tablet 3     amLODIPine (NORVASC) 10 MG tablet Take 1 tablet (10 mg) by mouth daily 30 tablet 3     cyclobenzaprine (FLEXERIL) 10 MG tablet Take 1 tablet (10 mg) by mouth daily 42 tablet 3     diclofenac (VOLTAREN) 1 % GEL topical gel Apply topically 4 times daily 100 g 3     gabapentin (NEURONTIN) 300 MG capsule Take 4 capsules (1,200 mg) by mouth 3 times daily 360 capsule 3     omeprazole (PRILOSEC) 20 MG CR capsule Take 1 capsule (20 mg) by mouth daily 30 capsule 3     trimethoprim-polymyxin b (POLYTRIM) ophthalmic solution Place 1 drop into both eyes 4 times daily 1 Bottle 3     nicotine (NICODERM CQ) 21 MG/24HR 24 hr patch Place 1 patch onto the skin every 24 hours 30 patch 3     zolpidem (AMBIEN) 5 MG tablet Take 1 tablet (5 mg) by mouth At Bedtime 60 tablet 0     HYDROcodone-acetaminophen (NORCO)  MG per tablet Take 1 tablet by mouth every 4 hours as needed for moderate to severe pain 180 tablet 0     DULoxetine (CYMBALTA) 60 MG EC capsule Take 1 capsule (60 mg) by mouth daily 30 capsule 1     spironolactone (ALDACTONE) 25 MG tablet Take 1 tablet (25 mg) by mouth daily 30 tablet 3     Misc. Devices (QUAD CANE) MISC Single Point Cane for home and community use.       [DISCONTINUED] spironolactone (ALDACTONE) 25 MG tablet Take 1 tablet (25 mg) by mouth daily Take 2 tablets by mouth every morning 30 tablet 3     [DISCONTINUED] DULoxetine (CYMBALTA) 30 MG EC capsule Take 1 capsule (30 mg) by mouth daily 60 capsule 3     [DISCONTINUED] spironolactone (ALDACTONE) 25 MG tablet Take 25 mg by mouth daily Take 2 tablets by mouth every morning       [DISCONTINUED] metoprolol tartrate (LOPRESSOR) 100 MG tablet Take 100 mg by mouth daily  0     [DISCONTINUED] losartan (COZAAR) 100 MG tablet Take 100 mg by  mouth daily       [DISCONTINUED] atorvastatin (LIPITOR) 80 MG tablet Take 80 mg by mouth At Bedtime       [DISCONTINUED] DULoxetine (CYMBALTA) 30 MG EC capsule Take 30 mg by mouth daily       [DISCONTINUED] citalopram (CELEXA) 20 MG tablet Take 20 mg by mouth daily       [DISCONTINUED] amLODIPine (NORVASC) 10 MG tablet Take 10 mg by mouth daily       [DISCONTINUED] gabapentin (NEURONTIN) 300 MG capsule Take 1,200 mg by mouth 4 times daily       Allergies   Allergen Reactions     Lisinopril Cough     Labs reviewed in EPIC    Reviewed and updated as needed this visit by clinical staff       Reviewed and updated as needed this visit by Provider       Review of Systems   Constitutional: Negative for diaphoresis, fever, malaise/fatigue and weight loss.   HENT: Negative for congestion, ear discharge, ear pain, hearing loss, nosebleeds and sore throat.    Eyes: Negative for blurred vision, double vision, photophobia, pain, discharge and redness.   Respiratory: Negative for cough, hemoptysis, sputum production, shortness of breath and wheezing.    Cardiovascular: Negative for chest pain, palpitations, orthopnea and leg swelling.   Gastrointestinal: Negative for abdominal pain, blood in stool, constipation, diarrhea, heartburn, melena, nausea and vomiting.   Genitourinary: Negative.  Negative for dysuria, frequency and urgency.   Musculoskeletal: Positive for back pain, joint pain, myalgias and neck pain.   Skin: Negative for itching and rash.   Neurological: Negative.  Negative for dizziness, tingling, sensory change, focal weakness, seizures, loss of consciousness, weakness and headaches.   Endo/Heme/Allergies: Negative.    Psychiatric/Behavioral: Negative for depression, hallucinations, substance abuse and suicidal ideas. The patient is not nervous/anxious and does not have insomnia.          Physical Exam   Constitutional: She is oriented to person, place, and time and well-developed, well-nourished, and in no distress.  No distress.   HENT:   Head: Normocephalic and atraumatic.   Right Ear: External ear normal.   Left Ear: External ear normal.   Nose: Nose normal.   Eyes: Conjunctivae and EOM are normal. Pupils are equal, round, and reactive to light. Right eye exhibits no discharge. Left eye exhibits no discharge. No scleral icterus.   Neck: Normal range of motion. Neck supple. No JVD present. No tracheal deviation present. No thyromegaly present.   Cardiovascular: Normal rate, regular rhythm, normal heart sounds and intact distal pulses.  Exam reveals no gallop and no friction rub.    No murmur heard.  Pulmonary/Chest: Effort normal and breath sounds normal. No stridor. No respiratory distress. She has no wheezes. She has no rales. She exhibits no tenderness.   Abdominal: Soft. Bowel sounds are normal. She exhibits no distension and no mass. There is no tenderness. There is no rebound and no guarding.   Musculoskeletal: She exhibits no edema.        Cervical back: She exhibits decreased range of motion, tenderness, pain and spasm.        Lumbar back: She exhibits decreased range of motion, tenderness, pain and spasm.   Lymphadenopathy:     She has no cervical adenopathy.   Neurological: She is alert and oriented to person, place, and time. She has normal reflexes. No cranial nerve deficit. She exhibits normal muscle tone. Gait normal.   Skin: Skin is warm and dry. No rash noted. She is not diaphoretic. No erythema. No pallor.   Psychiatric: Mood, memory, affect and judgment normal.             (G89.4) Chronic pain syndrome  (primary encounter diagnosis)  Comment:   Plan: Drug Abuse Screen Panel 13, Urine (Pain Care         Package), fa-pyridoxine-cyancobalamin 2.5-25-2         MG TABS per tablet, metoprolol tartrate         (LOPRESSOR) 100 MG tablet, losartan (COZAAR)         100 MG tablet, atorvastatin (LIPITOR) 80 MG         tablet, citalopram (CELEXA) 20 MG tablet,         cyanocobalamin (RA VITAMIN B-12 TR) 1000 MCG          TBCR, naproxen (NAPROSYN) 500 MG tablet,         amLODIPine (NORVASC) 10 MG tablet,         cyclobenzaprine (FLEXERIL) 10 MG tablet,         diclofenac (VOLTAREN) 1 % GEL topical gel,         gabapentin (NEURONTIN) 300 MG capsule,         omeprazole (PRILOSEC) 20 MG CR capsule,         trimethoprim-polymyxin b (POLYTRIM) ophthalmic         solution, nicotine (NICODERM CQ) 21 MG/24HR 24         hr patch, *MA Screening Digital Bilateral,         GASTROENTEROLOGY ADULT REF PROCEDURE ONLY,         HYDROcodone-acetaminophen (NORCO)  MG per        tablet, PAIN MANAGEMENT REFERRAL, DULoxetine         (CYMBALTA) 60 MG EC capsule, spironolactone         (ALDACTONE) 25 MG tablet, DISCONTINUED:         spironolactone (ALDACTONE) 25 MG tablet,         DISCONTINUED: zolpidem (AMBIEN) 5 MG tablet,         DISCONTINUED: DULoxetine (CYMBALTA) 30 MG EC         capsule, DISCONTINUED:         HYDROcodone-acetaminophen (NORCO)  MG per        tablet, DISCONTINUED: HYDROcodone-acetaminophen        (NORCO)  MG per tablet            (M54.2) Cervicalgia  Comment:  Plan: Drug Abuse Screen Panel 13, Urine (Pain Care         Package), fa-pyridoxine-cyancobalamin 2.5-25-2         MG TABS per tablet, metoprolol tartrate         (LOPRESSOR) 100 MG tablet, losartan (COZAAR)         100 MG tablet, atorvastatin (LIPITOR) 80 MG         tablet, citalopram (CELEXA) 20 MG tablet,         cyanocobalamin (RA VITAMIN B-12 TR) 1000 MCG         TBCR, naproxen (NAPROSYN) 500 MG tablet,         amLODIPine (NORVASC) 10 MG tablet,         cyclobenzaprine (FLEXERIL) 10 MG tablet,         diclofenac (VOLTAREN) 1 % GEL topical gel,         gabapentin (NEURONTIN) 300 MG capsule,         omeprazole (PRILOSEC) 20 MG CR capsule,         trimethoprim-polymyxin b (POLYTRIM) ophthalmic         solution, nicotine (NICODERM CQ) 21 MG/24HR 24         hr patch, *MA Screening Digital Bilateral,         GASTROENTEROLOGY ADULT REF PROCEDURE ONLY,          HYDROcodone-acetaminophen (NORCO)  MG per        tablet, PAIN MANAGEMENT REFERRAL, DULoxetine         (CYMBALTA) 60 MG EC capsule, spironolactone         (ALDACTONE) 25 MG tablet, DISCONTINUED:         spironolactone (ALDACTONE) 25 MG tablet,         DISCONTINUED: zolpidem (AMBIEN) 5 MG tablet,         DISCONTINUED: DULoxetine (CYMBALTA) 30 MG EC         capsule, DISCONTINUED:         HYDROcodone-acetaminophen (NORCO)  MG per        tablet, DISCONTINUED: HYDROcodone-acetaminophen        (NORCO)  MG per tablet            (M54.41,  G89.29) Chronic bilateral low back pain with right-sided sciatica  Comment:   Plan: Drug Abuse Screen Panel 13, Urine (Pain Care         Package), fa-pyridoxine-cyancobalamin 2.5-25-2         MG TABS per tablet, metoprolol tartrate         (LOPRESSOR) 100 MG tablet, losartan (COZAAR)         100 MG tablet, atorvastatin (LIPITOR) 80 MG         tablet, citalopram (CELEXA) 20 MG tablet,         cyanocobalamin (RA VITAMIN B-12 TR) 1000 MCG         TBCR, naproxen (NAPROSYN) 500 MG tablet,         amLODIPine (NORVASC) 10 MG tablet,         cyclobenzaprine (FLEXERIL) 10 MG tablet,         diclofenac (VOLTAREN) 1 % GEL topical gel,         gabapentin (NEURONTIN) 300 MG capsule,         omeprazole (PRILOSEC) 20 MG CR capsule,         trimethoprim-polymyxin b (POLYTRIM) ophthalmic         solution, nicotine (NICODERM CQ) 21 MG/24HR 24         hr patch, *MA Screening Digital Bilateral,         GASTROENTEROLOGY ADULT REF PROCEDURE ONLY,         HYDROcodone-acetaminophen (NORCO)  MG per        tablet, PAIN MANAGEMENT REFERRAL, DULoxetine         (CYMBALTA) 60 MG EC capsule, spironolactone         (ALDACTONE) 25 MG tablet, DISCONTINUED:         spironolactone (ALDACTONE) 25 MG tablet,         DISCONTINUED: zolpidem (AMBIEN) 5 MG tablet,         DISCONTINUED: DULoxetine (CYMBALTA) 30 MG EC         capsule, DISCONTINUED:         HYDROcodone-acetaminophen (NORCO)  MG per         tablet, DISCONTINUED: HYDROcodone-acetaminophen        (NORCO)  MG per tablet            (Z12.31) Encounter for screening mammogram for breast cancer  Comment:   Plan: Drug Abuse Screen Panel 13, Urine (Pain Care         Package), fa-pyridoxine-cyancobalamin 2.5-25-2         MG TABS per tablet, metoprolol tartrate         (LOPRESSOR) 100 MG tablet, losartan (COZAAR)         100 MG tablet, atorvastatin (LIPITOR) 80 MG         tablet, citalopram (CELEXA) 20 MG tablet,         cyanocobalamin (RA VITAMIN B-12 TR) 1000 MCG         TBCR, naproxen (NAPROSYN) 500 MG tablet,         amLODIPine (NORVASC) 10 MG tablet,         cyclobenzaprine (FLEXERIL) 10 MG tablet,         diclofenac (VOLTAREN) 1 % GEL topical gel,         gabapentin (NEURONTIN) 300 MG capsule,         omeprazole (PRILOSEC) 20 MG CR capsule,         trimethoprim-polymyxin b (POLYTRIM) ophthalmic         solution, nicotine (NICODERM CQ) 21 MG/24HR 24         hr patch, *MA Screening Digital Bilateral,         GASTROENTEROLOGY ADULT REF PROCEDURE ONLY,         spironolactone (ALDACTONE) 25 MG tablet,         DISCONTINUED: spironolactone (ALDACTONE) 25 MG         tablet, DISCONTINUED: zolpidem (AMBIEN) 5 MG         tablet, DISCONTINUED: DULoxetine (CYMBALTA) 30         MG EC capsule, DISCONTINUED:         HYDROcodone-acetaminophen (NORCO)  MG per        tablet            (Z12.11) Special screening for malignant neoplasms, colon  Comment:   Plan: Drug Abuse Screen Panel 13, Urine (Pain Care         Package), fa-pyridoxine-cyancobalamin 2.5-25-2         MG TABS per tablet, metoprolol tartrate         (LOPRESSOR) 100 MG tablet, losartan (COZAAR)         100 MG tablet, atorvastatin (LIPITOR) 80 MG         tablet, citalopram (CELEXA) 20 MG tablet,         cyanocobalamin (RA VITAMIN B-12 TR) 1000 MCG         TBCR, naproxen (NAPROSYN) 500 MG tablet,         amLODIPine (NORVASC) 10 MG tablet,         cyclobenzaprine (FLEXERIL) 10 MG tablet,          diclofenac (VOLTAREN) 1 % GEL topical gel,         gabapentin (NEURONTIN) 300 MG capsule,         omeprazole (PRILOSEC) 20 MG CR capsule,         trimethoprim-polymyxin b (POLYTRIM) ophthalmic         solution, nicotine (NICODERM CQ) 21 MG/24HR 24         hr patch, *MA Screening Digital Bilateral,         GASTROENTEROLOGY ADULT REF PROCEDURE ONLY,         spironolactone (ALDACTONE) 25 MG tablet,         DISCONTINUED: spironolactone (ALDACTONE) 25 MG         tablet, DISCONTINUED: zolpidem (AMBIEN) 5 MG         tablet, DISCONTINUED: DULoxetine (CYMBALTA) 30         MG EC capsule, DISCONTINUED:         HYDROcodone-acetaminophen (NORCO)  MG per        tablet            (I10) Essential hypertension  Comment:   Plan: Drug Abuse Screen Panel 13, Urine (Pain Care         Package), fa-pyridoxine-cyancobalamin 2.5-25-2         MG TABS per tablet, metoprolol tartrate         (LOPRESSOR) 100 MG tablet, losartan (COZAAR)         100 MG tablet, atorvastatin (LIPITOR) 80 MG         tablet, citalopram (CELEXA) 20 MG tablet,         cyanocobalamin (RA VITAMIN B-12 TR) 1000 MCG         TBCR, naproxen (NAPROSYN) 500 MG tablet,         amLODIPine (NORVASC) 10 MG tablet,         cyclobenzaprine (FLEXERIL) 10 MG tablet,         diclofenac (VOLTAREN) 1 % GEL topical gel,         gabapentin (NEURONTIN) 300 MG capsule,         omeprazole (PRILOSEC) 20 MG CR capsule,         trimethoprim-polymyxin b (POLYTRIM) ophthalmic         solution, nicotine (NICODERM CQ) 21 MG/24HR 24         hr patch, *MA Screening Digital Bilateral,         GASTROENTEROLOGY ADULT REF PROCEDURE ONLY,         spironolactone (ALDACTONE) 25 MG tablet,         DISCONTINUED: spironolactone (ALDACTONE) 25 MG         tablet, DISCONTINUED: zolpidem (AMBIEN) 5 MG         tablet, DISCONTINUED: DULoxetine (CYMBALTA) 30         MG EC capsule, DISCONTINUED:         HYDROcodone-acetaminophen (NORCO)  MG per        tablet           (F51.01) Primary insomnia  Comment:    Plan: Drug Abuse Screen Panel 13, Urine (Pain Care         Package), fa-pyridoxine-cyancobalamin 2.5-25-2         MG TABS per tablet, metoprolol tartrate         (LOPRESSOR) 100 MG tablet, losartan (COZAAR)         100 MG tablet, atorvastatin (LIPITOR) 80 MG         tablet, citalopram (CELEXA) 20 MG tablet,         cyanocobalamin (RA VITAMIN B-12 TR) 1000 MCG         TBCR, naproxen (NAPROSYN) 500 MG tablet,         amLODIPine (NORVASC) 10 MG tablet,         cyclobenzaprine (FLEXERIL) 10 MG tablet,         diclofenac (VOLTAREN) 1 % GEL topical gel,         gabapentin (NEURONTIN) 300 MG capsule,         omeprazole (PRILOSEC) 20 MG CR capsule,         trimethoprim-polymyxin b (POLYTRIM) ophthalmic         solution, nicotine (NICODERM CQ) 21 MG/24HR 24         hr patch, *MA Screening Digital Bilateral,         GASTROENTEROLOGY ADULT REF PROCEDURE ONLY,         zolpidem (AMBIEN) 5 MG tablet, spironolactone         (ALDACTONE) 25 MG tablet, DISCONTINUED:         spironolactone (ALDACTONE) 25 MG tablet,         DISCONTINUED: zolpidem (AMBIEN) 5 MG tablet,         DISCONTINUED: DULoxetine (CYMBALTA) 30 MG EC         capsule, DISCONTINUED:         HYDROcodone-acetaminophen (NORCO)  MG per        tablet            (F32.9) Depression, unspecified depression type  Comment:   Plan: Drug Abuse Screen Panel 13, Urine (Pain Care         Package), fa-pyridoxine-cyancobalamin 2.5-25-2         MG TABS per tablet, metoprolol tartrate         (LOPRESSOR) 100 MG tablet, losartan (COZAAR)         100 MG tablet, atorvastatin (LIPITOR) 80 MG         tablet, citalopram (CELEXA) 20 MG tablet,         cyanocobalamin (RA VITAMIN B-12 TR) 1000 MCG         TBCR, naproxen (NAPROSYN) 500 MG tablet,         amLODIPine (NORVASC) 10 MG tablet,         cyclobenzaprine (FLEXERIL) 10 MG tablet,         diclofenac (VOLTAREN) 1 % GEL topical gel,         gabapentin (NEURONTIN) 300 MG capsule,         omeprazole (PRILOSEC) 20 MG CR capsule,          trimethoprim-polymyxin b (POLYTRIM) ophthalmic         solution, nicotine (NICODERM CQ) 21 MG/24HR 24         hr patch, *MA Screening Digital Bilateral,         GASTROENTEROLOGY ADULT REF PROCEDURE ONLY,         MENTAL HEALTH REFERRAL  - Adult; Assessments         and Testing; General Psychological Testing;         [facility] [place] [facility] [phone]; We will contact you to schedule the         appointment or please call with any questions,         spironolactone (ALDACTONE) 25 MG tablet,         DISCONTINUED: spironolactone (ALDACTONE) 25 MG         tablet, DISCONTINUED: zolpidem (AMBIEN) 5 MG         tablet, DISCONTINUED: DULoxetine (CYMBALTA) 30         MG EC capsule, DISCONTINUED:         HYDROcodone-acetaminophen (NORCO)  MG per        tablet            (K21.9) Gastroesophageal reflux disease without esophagitis  Comment:   Plan: Drug Abuse Screen Panel 13, Urine (Pain Care         Package), fa-pyridoxine-cyancobalamin 2.5-25-2         MG TABS per tablet, metoprolol tartrate         (LOPRESSOR) 100 MG tablet, losartan (COZAAR)         100 MG tablet, atorvastatin (LIPITOR) 80 MG         tablet, citalopram (CELEXA) 20 MG tablet,         cyanocobalamin (RA VITAMIN B-12 TR) 1000 MCG         TBCR, naproxen (NAPROSYN) 500 MG tablet,         amLODIPine (NORVASC) 10 MG tablet,         cyclobenzaprine (FLEXERIL) 10 MG tablet,         diclofenac (VOLTAREN) 1 % GEL topical gel,         gabapentin (NEURONTIN) 300 MG capsule,         omeprazole (PRILOSEC) 20 MG CR capsule,         trimethoprim-polymyxin b (POLYTRIM) ophthalmic         solution, nicotine (NICODERM CQ) 21 MG/24HR 24         hr patch, *MA Screening Digital Bilateral,         GASTROENTEROLOGY ADULT REF PROCEDURE ONLY,         spironolactone (ALDACTONE) 25 MG tablet,         DISCONTINUED: spironolactone (ALDACTONE) 25 MG         tablet, DISCONTINUED: zolpidem (AMBIEN) 5 MG         tablet, DISCONTINUED: DULoxetine (CYMBALTA) 30          MG EC capsule, DISCONTINUED:         HYDROcodone-acetaminophen (NORCO)  MG per        tablet            (H10.13) Allergic conjunctivitis, bilateral  Comment:   Plan: Drug Abuse Screen Panel 13, Urine (Pain Care         Package), fa-pyridoxine-cyancobalamin 2.5-25-2         MG TABS per tablet, metoprolol tartrate         (LOPRESSOR) 100 MG tablet, losartan (COZAAR)         100 MG tablet, atorvastatin (LIPITOR) 80 MG         tablet, citalopram (CELEXA) 20 MG tablet,         cyanocobalamin (RA VITAMIN B-12 TR) 1000 MCG         TBCR, naproxen (NAPROSYN) 500 MG tablet,         amLODIPine (NORVASC) 10 MG tablet,         cyclobenzaprine (FLEXERIL) 10 MG tablet,         diclofenac (VOLTAREN) 1 % GEL topical gel,         gabapentin (NEURONTIN) 300 MG capsule,         omeprazole (PRILOSEC) 20 MG CR capsule,         trimethoprim-polymyxin b (POLYTRIM) ophthalmic         solution, nicotine (NICODERM CQ) 21 MG/24HR 24         hr patch, *MA Screening Digital Bilateral,         GASTROENTEROLOGY ADULT REF PROCEDURE ONLY,         spironolactone (ALDACTONE) 25 MG tablet,         DISCONTINUED: spironolactone (ALDACTONE) 25 MG         tablet, DISCONTINUED: zolpidem (AMBIEN) 5 MG         tablet, DISCONTINUED: DULoxetine (CYMBALTA) 30         MG EC capsule, DISCONTINUED:         HYDROcodone-acetaminophen (NORCO)  MG per        tablet            (F17.200) Tobacco dependence syndrome  Comment:   Plan: Drug Abuse Screen Panel 13, Urine (Pain Care         Package), fa-pyridoxine-cyancobalamin 2.5-25-2         MG TABS per tablet, metoprolol tartrate         (LOPRESSOR) 100 MG tablet, losartan (COZAAR)         100 MG tablet, atorvastatin (LIPITOR) 80 MG         tablet, citalopram (CELEXA) 20 MG tablet,         cyanocobalamin (RA VITAMIN B-12 TR) 1000 MCG         TBCR, naproxen (NAPROSYN) 500 MG tablet,         amLODIPine (NORVASC) 10 MG tablet,         cyclobenzaprine (FLEXERIL) 10 MG tablet,         diclofenac (VOLTAREN) 1 % GEL  topical gel,         gabapentin (NEURONTIN) 300 MG capsule,         omeprazole (PRILOSEC) 20 MG CR capsule,         trimethoprim-polymyxin b (POLYTRIM) ophthalmic         solution, nicotine (NICODERM CQ) 21 MG/24HR 24         hr patch, *MA Screening Digital Bilateral,         GASTROENTEROLOGY ADULT REF PROCEDURE ONLY,         spironolactone (ALDACTONE) 25 MG tablet,         DISCONTINUED: spironolactone (ALDACTONE) 25 MG         tablet, DISCONTINUED: zolpidem (AMBIEN) 5 MG         tablet, DISCONTINUED: DULoxetine (CYMBALTA) 30         MG EC capsule, DISCONTINUED:         HYDROcodone-acetaminophen (NORCO)  MG per        tablet         47 minuteswas spent with patient with greater than 50% of this time spent counseling patient regarding current treatment options etc.      Urine drug screen was repeated today.

## 2018-03-08 NOTE — MR AVS SNAPSHOT
After Visit Summary   3/8/2018    Anuradha Gray    MRN: [id number]           Patient Information     Date Of Birth          1965        Visit Information        Provider Department      3/8/2018 1:20 PM Adam Mcdonald PA-C [facility]        Today's Diagnoses     Chronic pain syndrome    -  1    Cervicalgia        Chronic bilateral low back pain with right-sided sciatica        Encounter for screening mammogram for breast cancer        Special screening for malignant neoplasms, colon        Essential hypertension        Primary insomnia        Depression, unspecified depression type        Gastroesophageal reflux disease without esophagitis        Allergic conjunctivitis, bilateral        Tobacco dependence syndrome           Follow-ups after your visit        Additional Services     GASTROENTEROLOGY ADULT REF PROCEDURE ONLY       Last Lab Result: No results found for: CR  Body mass index is 28.84 kg/(m^2).     Needed:  No  Language:  English    Patient will be contacted to schedule procedure.     Please be aware that coverage of these services is subject to the terms and limitations of your health insurance plan.  Call member services at your health plan with any benefit or coverage questions.  Any procedures must be performed at a [place] facility OR coordinated by your clinic's referral office.    Please bring the following with you to your appointment:    (1) Any X-Rays, CTs or MRIs which have been performed.  Contact the facility where they were done to arrange for  prior to your scheduled appointment.    (2) List of current medications   (3) This referral request   (4) Any documents/labs given to you for this referral            MENTAL HEALTH REFERRAL  - Adult; Assessments and Testing; General Psychological Testing; [facility] [place] [facility] to the [facility]: [facility] [phone]; We will contact you to schedule the appointment or please call with any questions        All scheduling is subject to the client's specific insurance plan & benefits, provider/location availability, and provider clinical specialities.  Please arrive 15 minutes early for your first appointment and bring your completed paperwork.    Please be aware that coverage of these services is subject to the terms and limitations of your health insurance plan.  Call member services at your health plan with any benefit or coverage questions.                      PAIN MANAGEMENT REFERRAL       Your provider has referred you to: [facility]: [facility] -    Reason for Referral: Consult-only- patient seen for one-time evaluation to provide recommendations back to referring provider.      Please complete the following questions:    Do you have any specific questions for the pain specialist? Yes:     Are there any red flags that may impact the assessment or management of the patient?       What is your diagnosis for the patient's pain? Chronic pain        For any questions, contact the [facility] at [phone].     **ANY DIAGNOSTIC TESTS THAT ARE NOT IN EPIC SHOULD BE SENT TO THE PAIN CENTER**    REGARDING OPIOID MEDICATIONS:  The discussion of opioids management, appropriateness of therapy, and dosing will be discussed in patients being seen for evaluation.  The pain management clinics are not long-term prescribing clinics, with transition of prescribing of medications ultimately going back to the referring provider/PCP.  If prescribing is taken over at the pain clinic, it is in actively involved patients whom are appropriate for opioids, urine drug screening is completed, and long-term prescribing plan has been determined.  Therefore, we will not be automatically taking over prescribing at the patient's first visit.  Is this agreeable to you? agrees.     Please be aware that coverage of these services is subject to the terms and limitations of your health insurance plan.  Call  "member services at your health plan with any benefit or coverage questions.      Please bring the following with you to your appointment:    (1) Any X-Rays, CTs or MRIs which have been performed.  Contact the facility where they were done to arrange for  prior to your scheduled appointment.    (2) List of current medications   (3) This referral request   (4) Any documents/labs given to you for this referral                  Future tests that were ordered for you today     Open Future Orders        Priority Expected Expires Ordered    *MA Screening Digital Bilateral Routine  3/8/2019 3/8/2018            Who to contact     If you have questions or need follow up information about today's clinic visit or your schedule please contact [facility] directly at [phone].  Normal or non-critical lab and imaging results will be communicated to you by DescribeMehart, letter or phone within 4 business days after the clinic has received the results. If you do not hear from us within 7 days, please contact the clinic through DescribeMehart or phone. If you have a critical or abnormal lab result, we will notify you by phone as soon as possible.  Submit refill requests through Taglocity or call your pharmacy and they will forward the refill request to us. Please allow 3 business days for your refill to be completed.          Additional Information About Your Visit        Taglocity Information     Taglocity lets you send messages to your doctor, view your test results, renew your prescriptions, schedule appointments and more. To sign up, go to [email or web address] . Click on \"Log in\" on the left side of the screen, which will take you to the Welcome page. Then click on \"Sign up Now\" on the right side of the page.     You will be asked to enter the access code listed below, as well as some personal information. Please follow the directions to create your username and password.     Your access code is: " [email or web address]  Expires: 2018  3:07 PM     Your access code will  in 90 days. If you need help or a new code, please call your [place] clinic or [phone].        Care EveryWhere ID     This is your Care EveryWhere ID. This could be used by other organizations to access your [place] medical records  [id number]        Your Vitals Were     Pulse Temperature BMI (Body Mass Index)             64 97.6  F (36.4  C) (Tympanic) 28.84 kg/m2          Blood Pressure from Last 3 Encounters:   18 122/76   18 102/60    Weight from Last 3 Encounters:   18 165 lb 6.4 oz (75 kg)   18 163 lb (73.9 kg)              We Performed the Following     Drug Abuse Screen Panel 13, Urine (Pain Care Package)     GASTROENTEROLOGY ADULT REF PROCEDURE ONLY     MENTAL HEALTH REFERRAL  - Adult; Assessments and Testing; General Psychological Testing; G: [facility] [phone]; We will contact you to schedule the appointment or please call with any questions     PAIN MANAGEMENT REFERRAL          Today's Medication Changes          These changes are accurate as of 3/8/18  2:09 PM.  If you have any questions, ask your nurse or doctor.               Start taking these medicines.        Dose/Directions    HYDROcodone-acetaminophen  MG per tablet   Commonly known as:  NORCO   Used for:  Cervicalgia, Chronic bilateral low back pain with right-sided sciatica, Chronic pain syndrome   Started by:  Adam Mcdonald PA-C        Dose:  1 tablet   Take 1 tablet by mouth every 4 hours as needed for moderate to severe pain   Quantity:  180 tablet   Refills:  0       zolpidem 5 MG tablet   Commonly known as:  AMBIEN   Used for:  Primary insomnia   Started by:  Adam Mcdonald PA-C        Dose:  5 mg   Take 1 tablet (5 mg) by mouth At Bedtime   Quantity:  60 tablet   Refills:  0         These medicines have changed or have updated prescriptions.        Dose/Directions    diclofenac 1 % Gel topical gel    Commonly known as:  VOLTAREN   This may have changed:  See the new instructions.   Used for:  Chronic pain syndrome, Cervicalgia, Chronic bilateral low back pain with right-sided sciatica, Encounter for screening mammogram for breast cancer, Special screening for malignant neoplasms, colon, Essential hypertension, Primary insomnia, Depression, unspecified depression type, Gastroesophageal reflux disease without esophagitis, Allergic conjunctivitis, bilateral, Tobacco dependence syndrome   Changed by:  Adam Mcdonald PA-C        Apply topically 4 times daily   Quantity:  100 g   Refills:  3       DULoxetine 60 MG EC capsule   Commonly known as:  CYMBALTA   This may have changed:    - medication strength  - how much to take   Used for:  Chronic pain syndrome, Cervicalgia, Chronic bilateral low back pain with right-sided sciatica   Changed by:  Adam Mcdonald PA-C        Dose:  60 mg   Take 1 capsule (60 mg) by mouth daily   Quantity:  30 capsule   Refills:  1       gabapentin 300 MG capsule   Commonly known as:  NEURONTIN   This may have changed:  when to take this   Used for:  Chronic pain syndrome, Cervicalgia, Chronic bilateral low back pain with right-sided sciatica, Encounter for screening mammogram for breast cancer, Special screening for malignant neoplasms, colon, Essential hypertension, Primary insomnia, Depression, unspecified depression type, Gastroesophageal reflux disease without esophagitis, Allergic conjunctivitis, bilateral, Tobacco dependence syndrome   Changed by:  Adam Mcdonald PA-C        Dose:  1200 mg   Take 4 capsules (1,200 mg) by mouth 3 times daily   Quantity:  360 capsule   Refills:  3       nicotine 21 MG/24HR 24 hr patch   Commonly known as:  NICODERM CQ   This may have changed:    - how to take this  - when to take this   Used for:  Tobacco dependence syndrome, Chronic pain syndrome, Cervicalgia, Chronic bilateral low back pain with right-sided sciatica, Encounter for screening  mammogram for breast cancer, Special screening for malignant neoplasms, colon, Essential hypertension, Primary insomnia, Depression, unspecified depression type, Gastroesophageal reflux disease without esophagitis, Allergic conjunctivitis, bilateral   Changed by:  Adam Mcdonald PA-C        Dose:  1 patch   Place 1 patch onto the skin every 24 hours   Quantity:  30 patch   Refills:  3       trimethoprim-polymyxin b ophthalmic solution   Commonly known as:  POLYTRIM   This may have changed:  See the new instructions.   Used for:  Allergic conjunctivitis, bilateral, Chronic pain syndrome, Cervicalgia, Chronic bilateral low back pain with right-sided sciatica, Encounter for screening mammogram for breast cancer, Special screening for malignant neoplasms, colon, Essential hypertension, Primary insomnia, Depression, unspecified depression type, Gastroesophageal reflux disease without esophagitis, Tobacco dependence syndrome   Changed by:  Adam Mcdonald PA-C        Dose:  1 drop   Place 1 drop into both eyes 4 times daily   Quantity:  1 Bottle   Refills:  3            Where to get your medicines      [facility] [address] [place] [address] AT [address] [address]     Phone:  [phone]     amLODIPine 10 MG tablet    atorvastatin 80 MG tablet    citalopram 20 MG tablet    cyanocobalamin 1000 MCG Tbcr    cyclobenzaprine 10 MG tablet    diclofenac 1 % Gel topical gel    DULoxetine 60 MG EC capsule    fa-pyridoxine-cyancobalamin 2.5-25-2 MG Tabs per tablet    gabapentin 300 MG capsule    losartan 100 MG tablet    metoprolol tartrate 100 MG tablet    naproxen 500 MG tablet    nicotine 21 MG/24HR 24 hr patch    omeprazole 20 MG CR capsule    spironolactone 25 MG tablet    trimethoprim-polymyxin b ophthalmic solution         Some of these will need a paper prescription and others can be bought over the counter.  Ask your  nurse if you have questions.     Bring a paper prescription for each of these medications     HYDROcodone-acetaminophen  MG per tablet    zolpidem 5 MG tablet                Primary Care Provider Office Phone # Fax #    Adam Mcdonald PA-C [phone] [address] [place] [id number]        Equal Access to Services     ANNABELLE HATCH : Hadii aad ku hadasho Soomaali, waaxda luqadaha, qaybta kaalmada adeegyada, waxay kalin hayaan adeeg kharash lalathan . So [facility] [phone].    ATENCIÓN: Si habla español, tiene a hollingwsorth disposición servicios gratuitos de asistencia lingüística. Llame al [phone].    We comply with applicable federal civil rights laws and [place] laws. We do not discriminate on the basis of race, color, national origin, age, disability, sex, sexual orientation, or gender identity.            Thank you!     Thank you for choosing [facility]  for your care. Our goal is always to provide you with excellent care. Hearing back from our patients is one way we can continue to improve our services. Please take a few minutes to complete the written survey that you may receive in the mail after your visit with us. Thank you!             Your Updated Medication List - Protect others around you: Learn how to safely use, store and throw away your medicines at www.disposemymeds.org.          This list is accurate as of 3/8/18  2:09 PM.  Always use your most recent med list.                   Brand Name Dispense Instructions for use Diagnosis    amLODIPine 10 MG tablet    NORVASC    30 tablet    Take 1 tablet (10 mg) by mouth daily    Essential hypertension, Chronic pain syndrome, Cervicalgia, Chronic bilateral low back pain with right-sided sciatica, Encounter for screening mammogram for breast cancer, Special screening for malignant neoplasms, colon, Primary insomnia, Depression, unspecified depression type, Gastroesophageal reflux disease without esophagitis,  Allergic conjunctivitis, bilateral, Tobacco dependence syndrome       atorvastatin 80 MG tablet    LIPITOR    30 tablet    Take 1 tablet (80 mg) by mouth At Bedtime    Essential hypertension, Chronic pain syndrome, Cervicalgia, Chronic bilateral low back pain with right-sided sciatica, Encounter for screening mammogram for breast cancer, Special screening for malignant neoplasms, colon, Primary insomnia, Depression, unspecified depression type, Gastroesophageal reflux disease without esophagitis, Allergic conjunctivitis, bilateral, Tobacco dependence syndrome       citalopram 20 MG tablet    celeXA    60 tablet    Take 1 tablet (20 mg) by mouth daily    Depression, unspecified depression type, Chronic pain syndrome, Cervicalgia, Chronic bilateral low back pain with right-sided sciatica, Encounter for screening mammogram for breast cancer, Special screening for malignant neoplasms, colon, Essential hypertension, Primary insomnia, Gastroesophageal reflux disease without esophagitis, Allergic conjunctivitis, bilateral, Tobacco dependence syndrome       cyanocobalamin 1000 MCG Tbcr    RA VITAMIN B-12 TR    30 tablet    Take 1,000 mcg by mouth daily    Chronic pain syndrome, Cervicalgia, Chronic bilateral low back pain with right-sided sciatica, Encounter for screening mammogram for breast cancer, Special screening for malignant neoplasms, colon, Essential hypertension, Primary insomnia, Depression, unspecified depression type, Gastroesophageal reflux disease without esophagitis, Allergic conjunctivitis, bilateral, Tobacco dependence syndrome       cyclobenzaprine 10 MG tablet    FLEXERIL    42 tablet    Take 1 tablet (10 mg) by mouth daily    Chronic pain syndrome, Cervicalgia, Chronic bilateral low back pain with right-sided sciatica, Encounter for screening mammogram for breast cancer, Special screening for malignant neoplasms, colon, Essential hypertension, Primary insomnia, Depression, unspecified depression type,  Gastroesophageal reflux disease without esophagitis, Allergic conjunctivitis, bilateral, Tobacco dependence syndrome       diclofenac 1 % Gel topical gel    VOLTAREN    100 g    Apply topically 4 times daily    Chronic pain syndrome, Cervicalgia, Chronic bilateral low back pain with right-sided sciatica, Encounter for screening mammogram for breast cancer, Special screening for malignant neoplasms, colon, Essential hypertension, Primary insomnia, Depression, unspecified depression type, Gastroesophageal reflux disease without esophagitis, Allergic conjunctivitis, bilateral, Tobacco dependence syndrome       DULoxetine 60 MG EC capsule    CYMBALTA    30 capsule    Take 1 capsule (60 mg) by mouth daily    Chronic pain syndrome, Cervicalgia, Chronic bilateral low back pain with right-sided sciatica       fa-pyridoxine-cyancobalamin 2.5-25-2 MG Tabs per tablet     30 each    Take 1 tablet by mouth daily    Chronic pain syndrome, Cervicalgia, Chronic bilateral low back pain with right-sided sciatica, Encounter for screening mammogram for breast cancer, Special screening for malignant neoplasms, colon, Essential hypertension, Primary insomnia, Depression, unspecified depression type, Gastroesophageal reflux disease without esophagitis, Allergic conjunctivitis, bilateral, Tobacco dependence syndrome       gabapentin 300 MG capsule    NEURONTIN    360 capsule    Take 4 capsules (1,200 mg) by mouth 3 times daily    Chronic pain syndrome, Cervicalgia, Chronic bilateral low back pain with right-sided sciatica, Encounter for screening mammogram for breast cancer, Special screening for malignant neoplasms, colon, Essential hypertension, Primary insomnia, Depression, unspecified depression type, Gastroesophageal reflux disease without esophagitis, Allergic conjunctivitis, bilateral, Tobacco dependence syndrome       HYDROcodone-acetaminophen  MG per tablet    NORCO    180 tablet    Take 1 tablet by mouth every 4 hours as  needed for moderate to severe pain    Cervicalgia, Chronic bilateral low back pain with right-sided sciatica, Chronic pain syndrome       losartan 100 MG tablet    COZAAR    30 tablet    Take 1 tablet (100 mg) by mouth daily    Essential hypertension, Chronic pain syndrome, Cervicalgia, Chronic bilateral low back pain with right-sided sciatica, Encounter for screening mammogram for breast cancer, Special screening for malignant neoplasms, colon, Primary insomnia, Depression, unspecified depression type, Gastroesophageal reflux disease without esophagitis, Allergic conjunctivitis, bilateral, Tobacco dependence syndrome       metoprolol tartrate 100 MG tablet    LOPRESSOR    60 tablet    Take 1 tablet (100 mg) by mouth daily    Essential hypertension, Chronic pain syndrome, Cervicalgia, Chronic bilateral low back pain with right-sided sciatica, Encounter for screening mammogram for breast cancer, Special screening for malignant neoplasms, colon, Primary insomnia, Depression, unspecified depression type, Gastroesophageal reflux disease without esophagitis, Allergic conjunctivitis, bilateral, Tobacco dependence syndrome       naproxen 500 MG tablet    NAPROSYN    60 tablet    Take 1 tablet (500 mg) by mouth 2 times daily    Chronic pain syndrome, Cervicalgia, Chronic bilateral low back pain with right-sided sciatica, Encounter for screening mammogram for breast cancer, Special screening for malignant neoplasms, colon, Essential hypertension, Primary insomnia, Depression, unspecified depression type, Gastroesophageal reflux disease without esophagitis, Allergic conjunctivitis, bilateral, Tobacco dependence syndrome       nicotine 21 MG/24HR 24 hr patch    NICODERM CQ    30 patch    Place 1 patch onto the skin every 24 hours    Tobacco dependence syndrome, Chronic pain syndrome, Cervicalgia, Chronic bilateral low back pain with right-sided sciatica, Encounter for screening mammogram for breast cancer, Special screening  for malignant neoplasms, colon, Essential hypertension, Primary insomnia, Depression, unspecified depression type, Gastroesophageal reflux disease without esophagitis, Allergic conjunctivitis, bilateral       omeprazole 20 MG CR capsule    priLOSEC    30 capsule    Take 1 capsule (20 mg) by mouth daily    Gastroesophageal reflux disease without esophagitis, Chronic pain syndrome, Cervicalgia, Chronic bilateral low back pain with right-sided sciatica, Encounter for screening mammogram for breast cancer, Special screening for malignant neoplasms, colon, Essential hypertension, Primary insomnia, Depression, unspecified depression type, Allergic conjunctivitis, bilateral, Tobacco dependence syndrome       Quad Cane Misc      Single Point Cane for home and community use.        spironolactone 25 MG tablet    ALDACTONE    30 tablet    Take 1 tablet (25 mg) by mouth daily Take 2 tablets by mouth every morning    Essential hypertension, Chronic pain syndrome, Cervicalgia, Chronic bilateral low back pain with right-sided sciatica, Encounter for screening mammogram for breast cancer, Special screening for malignant neoplasms, colon, Primary insomnia, Depression, unspecified depression type, Gastroesophageal reflux disease without esophagitis, Allergic conjunctivitis, bilateral, Tobacco dependence syndrome       trimethoprim-polymyxin b ophthalmic solution    POLYTRIM    1 Bottle    Place 1 drop into both eyes 4 times daily    Allergic conjunctivitis, bilateral, Chronic pain syndrome, Cervicalgia, Chronic bilateral low back pain with right-sided sciatica, Encounter for screening mammogram for breast cancer, Special screening for malignant neoplasms, colon, Essential hypertension, Primary insomnia, Depression, unspecified depression type, Gastroesophageal reflux disease without esophagitis, Tobacco dependence syndrome       zolpidem 5 MG tablet    AMBIEN    60 tablet    Take 1 tablet (5 mg) by mouth At Bedtime    Primary  insomnia

## 2018-03-08 NOTE — NURSING NOTE
"Chief Complaint   Patient presents with     Pain       Initial /76 (BP Location: Left arm, Patient Position: Sitting, Cuff Size: Adult Large)  Pulse 64  Temp 97.6  F (36.4  C) (Tympanic)  Wt 165 lb 6.4 oz (75 kg)  BMI 28.84 kg/m2 Estimated body mass index is 28.84 kg/(m^2) as calculated from the following:    Height as of 2/23/18: 5' 3.5\" (1.613 m).    Weight as of this encounter: 165 lb 6.4 oz (75 kg).      Health Maintenance that is potentially due pending provider review:  Mammogram, Pap Smear and Colonoscopy/FIT    Will see OB/GYN for physical.  Orders placed for mammogram and colonoscopy     Is there anyone who you would like to be able to receive your results? No  If yes have patient fill out MINGO    Mica VELASCO CMA    "

## 2018-03-09 ENCOUNTER — TELEPHONE (OUTPATIENT)
Dept: PALLIATIVE MEDICINE | Facility: CLINIC | Age: 53
End: 2018-03-09

## 2018-03-09 LAB
AMPHETAMINES UR QL: NOT DETECTED NG/ML
BARBITURATES UR QL SCN: NOT DETECTED NG/ML
BENZODIAZ UR QL SCN: NOT DETECTED NG/ML
BUPRENORPHINE UR QL: NOT DETECTED NG/ML
CANNABINOIDS UR QL: NOT DETECTED NG/ML
COCAINE UR QL SCN: NOT DETECTED NG/ML
D-METHAMPHET UR QL: NOT DETECTED NG/ML
METHADONE UR QL SCN: NOT DETECTED NG/ML
OPIATES UR QL SCN: ABNORMAL NG/ML
OXYCODONE UR QL SCN: ABNORMAL NG/ML
PCP UR QL SCN: NOT DETECTED NG/ML
PROPOXYPH UR QL: NOT DETECTED NG/ML
TRICYCLICS UR QL SCN: NOT DETECTED NG/ML

## 2018-03-09 RX ORDER — CITALOPRAM HYDROBROMIDE 20 MG/1
TABLET ORAL
Qty: 30 TABLET | Refills: 0 | OUTPATIENT
Start: 2018-03-09

## 2018-03-12 NOTE — TELEPHONE ENCOUNTER
Pain Management Center Referral      1. Confirmed address with patient? Yes  2. Confirmed phone number with patient? Yes  3. Confirmed referring provider? Yes  4. Is the PCP the same as the referring provider? Yes  5. Has the patient been to any previous pain clinics? Yes  (If yes, send MINGO with welcome letter)  6. Which insurance are we to bill for this appointment?  [employer]    7. Informed pt of cancellation (48 hour) policy? Yes    REGARDING OPIOID MEDICATIONS: We will always address appropriateness of opioid pain medications, but we generally will not automatically take on a prescribing role. When we do take on prescribing of opioids for chronic pain, it is in collaboration with the referring physician for an intermediate period of time (months), with an expectation that the primary physician or provider will assume the prescribing role if medications are effective at stable doses with demonstrated compliance. Therefore, please do not assume that your prescribing responsibilities end on the day of pain clinic consultation.  7. Informed pt of prescribing policy? Yes      8. Referring Provider: Adam Mcdonald

## 2018-03-14 ENCOUNTER — TELEPHONE (OUTPATIENT)
Dept: FAMILY MEDICINE | Facility: CLINIC | Age: 53
End: 2018-03-14

## 2018-03-14 PROBLEM — F32.A DEPRESSION, UNSPECIFIED DEPRESSION TYPE: Status: ACTIVE | Noted: 2018-03-14

## 2018-03-14 PROBLEM — I10 ESSENTIAL HYPERTENSION: Status: ACTIVE | Noted: 2018-03-14

## 2018-03-14 NOTE — TELEPHONE ENCOUNTER
Reason for call:  Patient reporting a symptom    Symptom or request: Anuradha says she is seeing Keon Mcdonald now and is on 6 Norco daily. She says prior to this she was on 2 Oxycodone but Keon Mcdonald had her stop this. She says since then, she has so much pain in the morning that she can hardly move around. She is asking to talk to the nurse.        Phone Number patient can be reached at:  Home number on file [phone] (home)    Best Time:  anytime    Can we leave a detailed message on this number:  YES    Call taken on 3/14/2018 at 12:58 PM by Leticia Martinez

## 2018-03-15 LAB
6MAM SERPL-MCNC: NEGATIVE NG/ML
CODEINE UR CFM-MCNC: NEGATIVE NG/ML
MORPHINE UR CFM-MCNC: NEGATIVE NG/ML

## 2018-03-15 NOTE — TELEPHONE ENCOUNTER
S-(situation): Calling with increased pain since stopping OxyContin last week.    B-(background): Seen in clinic 3/8/18 by POPPY Mcdonald PA-C.    A-(assessment): Rates pain an 8/10 on average daily, lower back,hips, legs, neck.     R-(recommendations): Routing to provider.    Cannot see pain clinic until 4/25/18 - she is scheduled.     Also, per pt, the pain clinic referral wording needs to be changed from 'Consult' to 'Treatment.'    Joellen SHAW RN

## 2018-03-15 NOTE — TELEPHONE ENCOUNTER
I would continue with present medication without altering this.  I do not think OxyContin is a good choice in her situation and would recommend waiting for pain clinic evaluation.  As far as changing it from consult to treatment, that verbiage does not matter because they will give us recommendations regardless and they do not initiate treatment on first visit

## 2018-03-28 ENCOUNTER — OFFICE VISIT (OUTPATIENT)
Dept: FAMILY MEDICINE | Facility: CLINIC | Age: 53
End: 2018-03-28
Payer: COMMERCIAL

## 2018-03-28 VITALS
WEIGHT: 165 LBS | TEMPERATURE: 97.3 F | RESPIRATION RATE: 16 BRPM | HEART RATE: 60 BPM | BODY MASS INDEX: 28.77 KG/M2 | SYSTOLIC BLOOD PRESSURE: 124 MMHG | DIASTOLIC BLOOD PRESSURE: 80 MMHG

## 2018-03-28 DIAGNOSIS — G89.29 CHRONIC BILATERAL LOW BACK PAIN WITH LEFT-SIDED SCIATICA: Primary | ICD-10-CM

## 2018-03-28 DIAGNOSIS — G89.4 CHRONIC PAIN SYNDROME: ICD-10-CM

## 2018-03-28 DIAGNOSIS — M54.2 CERVICALGIA: ICD-10-CM

## 2018-03-28 DIAGNOSIS — M54.41 CHRONIC BILATERAL LOW BACK PAIN WITH RIGHT-SIDED SCIATICA: ICD-10-CM

## 2018-03-28 DIAGNOSIS — M54.42 CHRONIC BILATERAL LOW BACK PAIN WITH LEFT-SIDED SCIATICA: Primary | ICD-10-CM

## 2018-03-28 DIAGNOSIS — G89.29 CHRONIC BILATERAL LOW BACK PAIN WITH RIGHT-SIDED SCIATICA: ICD-10-CM

## 2018-03-28 PROCEDURE — 80307 DRUG TEST PRSMV CHEM ANLYZR: CPT | Mod: 90 | Performed by: PHYSICIAN ASSISTANT

## 2018-03-28 PROCEDURE — 99000 SPECIMEN HANDLING OFFICE-LAB: CPT | Performed by: PHYSICIAN ASSISTANT

## 2018-03-28 PROCEDURE — 99214 OFFICE O/P EST MOD 30 MIN: CPT | Performed by: PHYSICIAN ASSISTANT

## 2018-03-28 RX ORDER — HYDROCODONE BITARTRATE AND ACETAMINOPHEN 10; 325 MG/1; MG/1
1 TABLET ORAL EVERY 4 HOURS PRN
Qty: 180 TABLET | Refills: 0 | Status: SHIPPED | OUTPATIENT
Start: 2018-04-09

## 2018-03-28 ASSESSMENT — ENCOUNTER SYMPTOMS
WEIGHT LOSS: 0
HALLUCINATIONS: 0
DIARRHEA: 0
EYE REDNESS: 0
BACK PAIN: 1
DEPRESSION: 0
CONSTIPATION: 0
WEAKNESS: 0
MYALGIAS: 1
ABDOMINAL PAIN: 0
BLURRED VISION: 0
HEADACHES: 0
DOUBLE VISION: 0
FEVER: 0
EYE PAIN: 0
PALPITATIONS: 0
SORE THROAT: 0
DIZZINESS: 0
DIAPHORESIS: 0
HEMOPTYSIS: 0
PHOTOPHOBIA: 0
NEUROLOGICAL NEGATIVE: 1
SHORTNESS OF BREATH: 0
NECK PAIN: 0
HEARTBURN: 0
VOMITING: 0
SEIZURES: 0
DYSURIA: 0
SENSORY CHANGE: 0
COUGH: 0
TINGLING: 0
FOCAL WEAKNESS: 0
BLOOD IN STOOL: 0
NERVOUS/ANXIOUS: 0
SPUTUM PRODUCTION: 0
ORTHOPNEA: 0
NAUSEA: 0
LOSS OF CONSCIOUSNESS: 0
EYE DISCHARGE: 0
WHEEZING: 0
INSOMNIA: 0
FREQUENCY: 0

## 2018-03-28 ASSESSMENT — PAIN SCALES - GENERAL: PAINLEVEL: EXTREME PAIN (8)

## 2018-03-28 ASSESSMENT — LIFESTYLE VARIABLES: SUBSTANCE_ABUSE: 0

## 2018-03-28 NOTE — PROGRESS NOTES
HPI      SUBJECTIVE:   Anuradha Gray is a 53 year old female who presents to clinic today for follow-up of her chronic pain management.  She is requesting OxyContin but I told her I am not comfortable prescribing that medication without our pain clinic recommending it.  She has been on hydrocodone and states that she is not as comfortable as she was when she was on both medications.  She has an appointment next month with the pain clinic.  She is a recent transfer to this clinic.  She also recently had an injection and they wanted her to start some physical therapy and I explained that we can order this at her convenience.    Medication Followup of Norco and Oxycodone- long lasting     Taking Medication as prescribed: yes    Side Effects:  None    Medication Helping Symptoms:  yes    See's white dots whenever blood pressure goes up- only happened a couple times over the last couple weeks.          Problem list and histories reviewed & adjusted, as indicated.  Additional history: as documented    Patient Active Problem List   Diagnosis     Chronic pain     Depression, unspecified depression type     Essential hypertension     History reviewed. No pertinent surgical history.    Social History   Substance Use Topics     Smoking status: Current Every Day Smoker     Smokeless tobacco: Never Used     Alcohol use Not on file     History reviewed. No pertinent family history.      Current Outpatient Prescriptions   Medication Sig Dispense Refill     fa-pyridoxine-cyancobalamin 2.5-25-2 MG TABS per tablet Take 1 tablet by mouth daily 30 each 3     metoprolol tartrate (LOPRESSOR) 100 MG tablet Take 1 tablet (100 mg) by mouth daily 60 tablet 0     losartan (COZAAR) 100 MG tablet Take 1 tablet (100 mg) by mouth daily 30 tablet 3     atorvastatin (LIPITOR) 80 MG tablet Take 1 tablet (80 mg) by mouth At Bedtime 30 tablet 3     citalopram (CELEXA) 20 MG tablet Take 1 tablet (20 mg) by mouth daily 60 tablet 3     cyanocobalamin  (RA VITAMIN B-12 TR) 1000 MCG TBCR Take 1,000 mcg by mouth daily 30 tablet 3     naproxen (NAPROSYN) 500 MG tablet Take 1 tablet (500 mg) by mouth 2 times daily 60 tablet 3     amLODIPine (NORVASC) 10 MG tablet Take 1 tablet (10 mg) by mouth daily 30 tablet 3     cyclobenzaprine (FLEXERIL) 10 MG tablet Take 1 tablet (10 mg) by mouth daily 42 tablet 3     diclofenac (VOLTAREN) 1 % GEL topical gel Apply topically 4 times daily 100 g 3     gabapentin (NEURONTIN) 300 MG capsule Take 4 capsules (1,200 mg) by mouth 3 times daily 360 capsule 3     omeprazole (PRILOSEC) 20 MG CR capsule Take 1 capsule (20 mg) by mouth daily 30 capsule 3     trimethoprim-polymyxin b (POLYTRIM) ophthalmic solution Place 1 drop into both eyes 4 times daily 1 Bottle 3     nicotine (NICODERM CQ) 21 MG/24HR 24 hr patch Place 1 patch onto the skin every 24 hours 30 patch 3     zolpidem (AMBIEN) 5 MG tablet Take 1 tablet (5 mg) by mouth At Bedtime 60 tablet 0     HYDROcodone-acetaminophen (NORCO)  MG per tablet Take 1 tablet by mouth every 4 hours as needed for moderate to severe pain 180 tablet 0     DULoxetine (CYMBALTA) 60 MG EC capsule Take 1 capsule (60 mg) by mouth daily 30 capsule 1     spironolactone (ALDACTONE) 25 MG tablet Take 1 tablet (25 mg) by mouth daily 30 tablet 3     Misc. Devices (QUAD CANE) MISC Single Point Cane for home and community use.       Allergies   Allergen Reactions     Lisinopril Cough     Labs reviewed in EPIC    Reviewed and updated as needed this visit by clinical staff       Reviewed and updated as needed this visit by Provider                 Review of Systems   Constitutional: Negative for diaphoresis, fever, malaise/fatigue and weight loss.   HENT: Negative for congestion, ear discharge, ear pain, hearing loss, nosebleeds and sore throat.    Eyes: Negative for blurred vision, double vision, photophobia, pain, discharge and redness.   Respiratory: Negative for cough, hemoptysis, sputum production,  shortness of breath and wheezing.    Cardiovascular: Negative for chest pain, palpitations, orthopnea and leg swelling.   Gastrointestinal: Negative for abdominal pain, blood in stool, constipation, diarrhea, heartburn, melena, nausea and vomiting.   Genitourinary: Negative.  Negative for dysuria, frequency and urgency.   Musculoskeletal: Positive for back pain and myalgias. Negative for joint pain and neck pain.   Skin: Negative for itching and rash.   Neurological: Negative.  Negative for dizziness, tingling, sensory change, focal weakness, seizures, loss of consciousness, weakness and headaches.   Endo/Heme/Allergies: Negative.    Psychiatric/Behavioral: Negative for depression, hallucinations, substance abuse and suicidal ideas. The patient is not nervous/anxious and does not have insomnia.          Physical Exam   Constitutional: She is oriented to person, place, and time and well-developed, well-nourished, and in no distress. No distress.   HENT:   Head: Normocephalic and atraumatic.   Right Ear: External ear normal.   Left Ear: External ear normal.   Nose: Nose normal.   Eyes: Conjunctivae and EOM are normal. Pupils are equal, round, and reactive to light. Right eye exhibits no discharge. Left eye exhibits no discharge. No scleral icterus.   Neck: Normal range of motion. Neck supple. No JVD present. No tracheal deviation present. No thyromegaly present.   Cardiovascular: Normal rate, regular rhythm, normal heart sounds and intact distal pulses.  Exam reveals no gallop and no friction rub.    No murmur heard.  Pulmonary/Chest: Effort normal and breath sounds normal. No stridor. No respiratory distress. She has no wheezes. She has no rales. She exhibits no tenderness.   Abdominal: Soft. Bowel sounds are normal. She exhibits no distension and no mass. There is no tenderness. There is no rebound and no guarding.   Musculoskeletal: She exhibits no edema.        Lumbar back: She exhibits decreased range of motion,  tenderness, pain and spasm.   Lymphadenopathy:     She has no cervical adenopathy.   Neurological: She is alert and oriented to person, place, and time. She has normal reflexes. No cranial nerve deficit. She exhibits normal muscle tone. Gait normal.   Skin: Skin is warm and dry. No rash noted. She is not diaphoretic. No erythema. No pallor.   Psychiatric: Mood, memory, affect and judgment normal.       I have refilled her hydrocodone but I did not refill her OxyContin(M54.42,  G89.29) Chronic bilateral low back pain with left-sided sciatica  (primary encounter diagnosis)  Comment:   Plan: Drug  Screen Comprehensive , Urine with         Reported Meds (MedTox) (Pain Care Package)            (M54.2) Cervicalgia  Comment:   Plan: HYDROcodone-acetaminophen (NORCO)  MG per        tablet, Drug  Screen Comprehensive , Urine with        Reported Meds (MedTox) (Pain Care Package)            (M54.41,  G89.29) Chronic bilateral low back pain with right-sided sciatica  Comment:   Plan: HYDROcodone-acetaminophen (NORCO)  MG per        tablet, Drug  Screen Comprehensive , Urine with        Reported Meds (MedTox) (Pain Care Package)            (G89.4) Chronic pain syndrome  Comment:   Plan: HYDROcodone-acetaminophen (NORCO)  MG per        tablet, Drug  Screen Comprehensive , Urine with        Reported Meds (MedTox) (Pain Care Package)         And we will wait until after her appointment with the pain clinic to determine the best treatment plan for her.  Physical therapy is a good idea but she will wait until she is seen at the pain clinic for that as well.  Urine drug screen was performed today.

## 2018-03-28 NOTE — MR AVS SNAPSHOT
After Visit Summary   3/28/2018    Anuradha Gray    MRN: [id number]           Patient Information     Date Of Birth          1965        Visit Information        Provider Department      3/28/2018 12:40 PM Adam Mcdonald PA-C [facility]        Today's Diagnoses     Chronic bilateral low back pain with left-sided sciatica    -  1    Cervicalgia        Chronic bilateral low back pain with right-sided sciatica        Chronic pain syndrome           Follow-ups after your visit        Your next 10 appointments already scheduled     Apr 25, 2018 10:00 AM CDT   New Visit with Bony Murphy MD   [facility] ([facility])    [address] [address]   [phone]            May 02, 2018  1:15 PM CDT   (Arrive by 1:00 PM)   MA SCREENING DIGITAL BILATERAL with [facility]   [facility]    [address] [place] [id number]   [phone]           Do not use any powder, lotion or deodorant under your arms or on your breast. If you do, we will ask you to remove it before your exam.  Wear comfortable, two-piece clothing.  If you have any allergies, tell your care team.  Bring any previous mammograms from other facilities or have them mailed to the breast center.              Who to contact     If you have questions or need follow up information about today's clinic visit or your schedule please contact [facility] directly at [phone].  Normal or non-critical lab and imaging results will be communicated to you by MyChart, letter or phone within 4 business days after the clinic has received the results. If you do not hear from us within 7 days, please contact the clinic through [employer] or phone. If you have a critical or abnormal lab result, we will notify you by phone as soon as possible.  Submit refill requests through [employer] or call your  "pharmacy and they will forward the refill request to us. Please allow 3 business days for your refill to be completed.          Additional Information About Your Visit        MyChart Information     [employer] lets you send messages to your doctor, view your test results, renew your prescriptions, schedule appointments and more. To sign up, go to [email or web address] . Click on \"Log in\" on the left side of the screen, which will take you to the Welcome page. Then click on \"Sign up Now\" on the right side of the page.     You will be asked to enter the access code listed below, as well as some personal information. Please follow the directions to create your username and password.     Your access code is: [email or web address]  Expires: 2018  4:07 PM     Your access code will  in 90 days. If you need help or a new code, please call your [place] clinic or [phone].        Care EveryWhere ID     This is your [facility] EveryWhere ID. This could be used by other organizations to access your [facility] [place] medical records  [id number]        Your Vitals Were     Pulse Temperature Respirations BMI (Body Mass Index)          60 97.3  F (36.3  C) (Tympanic) 16 28.77 kg/m2         Blood Pressure from Last 3 Encounters:   18 124/80   18 122/76   18 102/60    Weight from Last 3 Encounters:   18 165 lb (74.8 kg)   18 165 lb 6.4 oz (75 kg)   18 163 lb (73.9 kg)              We Performed the Following     Drug  Screen Comprehensive , Urine with Reported Meds (MedTox) (Pain Care Package)          Where to get your medicines      Some of these will need a paper prescription and others can be bought over the counter.  Ask your nurse if you have questions.     Bring a paper prescription for each of these medications     HYDROcodone-acetaminophen  MG per tablet          Primary Care Provider Office Phone # Fax #    Adam Mcdonald PA-C [phone] [address] [place] [address] " [phone]        Equal Access to Services     [facility] : Hadii aad ku hadbrandonden Oliviaali, wahéctorda luqjudithha, qaybta kolejennifermykel clements. So [facility] [phone].    ATENCIÓN: Si habla español, tiene a hollingsworth disposición servicios gratuitos de asistencia lingüística. Llame al [phone].    We comply with applicable federal civil rights laws and [place] laws. We do not discriminate on the basis of race, color, national origin, age, disability, sex, sexual orientation, or gender identity.            Thank you!     Thank you for choosing [facility]  for your care. Our goal is always to provide you with excellent care. Hearing back from our patients is one way we can continue to improve our services. Please take a few minutes to complete the written survey that you may receive in the mail after your visit with us. Thank you!             Your Updated Medication List - Protect others around you: Learn how to safely use, store and throw away your medicines at www.disposemymeds.org.          This list is accurate as of 3/28/18  1:19 PM.  Always use your most recent med list.                   Brand Name Dispense Instructions for use Diagnosis    amLODIPine 10 MG tablet    NORVASC    30 tablet    Take 1 tablet (10 mg) by mouth daily    Essential hypertension, Chronic pain syndrome, Cervicalgia, Chronic bilateral low back pain with right-sided sciatica, Encounter for screening mammogram for breast cancer, Special screening for malignant neoplasms, colon, Primary insomnia, Depression, unspecified depression type, Gastroesophageal reflux disease without esophagitis, Allergic conjunctivitis, bilateral, Tobacco dependence syndrome       atorvastatin 80 MG tablet    LIPITOR    30 tablet    Take 1 tablet (80 mg) by mouth At Bedtime    Essential hypertension, Chronic pain syndrome, Cervicalgia, Chronic bilateral low back pain with right-sided sciatica, Encounter for screening  mammogram for breast cancer, Special screening for malignant neoplasms, colon, Primary insomnia, Depression, unspecified depression type, Gastroesophageal reflux disease without esophagitis, Allergic conjunctivitis, bilateral, Tobacco dependence syndrome       citalopram 20 MG tablet    celeXA    60 tablet    Take 1 tablet (20 mg) by mouth daily    Depression, unspecified depression type, Chronic pain syndrome, Cervicalgia, Chronic bilateral low back pain with right-sided sciatica, Encounter for screening mammogram for breast cancer, Special screening for malignant neoplasms, colon, Essential hypertension, Primary insomnia, Gastroesophageal reflux disease without esophagitis, Allergic conjunctivitis, bilateral, Tobacco dependence syndrome       cyanocobalamin 1000 MCG Tbcr    RA VITAMIN B-12 TR    30 tablet    Take 1,000 mcg by mouth daily    Chronic pain syndrome, Cervicalgia, Chronic bilateral low back pain with right-sided sciatica, Encounter for screening mammogram for breast cancer, Special screening for malignant neoplasms, colon, Essential hypertension, Primary insomnia, Depression, unspecified depression type, Gastroesophageal reflux disease without esophagitis, Allergic conjunctivitis, bilateral, Tobacco dependence syndrome       cyclobenzaprine 10 MG tablet    FLEXERIL    42 tablet    Take 1 tablet (10 mg) by mouth daily    Chronic pain syndrome, Cervicalgia, Chronic bilateral low back pain with right-sided sciatica, Encounter for screening mammogram for breast cancer, Special screening for malignant neoplasms, colon, Essential hypertension, Primary insomnia, Depression, unspecified depression type, Gastroesophageal reflux disease without esophagitis, Allergic conjunctivitis, bilateral, Tobacco dependence syndrome       diclofenac 1 % Gel topical gel    VOLTAREN    100 g    Apply topically 4 times daily    Chronic pain syndrome, Cervicalgia, Chronic bilateral low back pain with right-sided sciatica,  Encounter for screening mammogram for breast cancer, Special screening for malignant neoplasms, colon, Essential hypertension, Primary insomnia, Depression, unspecified depression type, Gastroesophageal reflux disease without esophagitis, Allergic conjunctivitis, bilateral, Tobacco dependence syndrome       DULoxetine 60 MG EC capsule    CYMBALTA    30 capsule    Take 1 capsule (60 mg) by mouth daily    Chronic pain syndrome, Cervicalgia, Chronic bilateral low back pain with right-sided sciatica       fa-pyridoxine-cyancobalamin 2.5-25-2 MG Tabs per tablet     30 each    Take 1 tablet by mouth daily    Chronic pain syndrome, Cervicalgia, Chronic bilateral low back pain with right-sided sciatica, Encounter for screening mammogram for breast cancer, Special screening for malignant neoplasms, colon, Essential hypertension, Primary insomnia, Depression, unspecified depression type, Gastroesophageal reflux disease without esophagitis, Allergic conjunctivitis, bilateral, Tobacco dependence syndrome       gabapentin 300 MG capsule    NEURONTIN    360 capsule    Take 4 capsules (1,200 mg) by mouth 3 times daily    Chronic pain syndrome, Cervicalgia, Chronic bilateral low back pain with right-sided sciatica, Encounter for screening mammogram for breast cancer, Special screening for malignant neoplasms, colon, Essential hypertension, Primary insomnia, Depression, unspecified depression type, Gastroesophageal reflux disease without esophagitis, Allergic conjunctivitis, bilateral, Tobacco dependence syndrome       HYDROcodone-acetaminophen  MG per tablet   Start taking on:  4/9/2018    NORCO    180 tablet    Take 1 tablet by mouth every 4 hours as needed for moderate to severe pain    Cervicalgia, Chronic bilateral low back pain with right-sided sciatica, Chronic pain syndrome       losartan 100 MG tablet    COZAAR    30 tablet    Take 1 tablet (100 mg) by mouth daily    Essential hypertension, Chronic pain syndrome,  Cervicalgia, Chronic bilateral low back pain with right-sided sciatica, Encounter for screening mammogram for breast cancer, Special screening for malignant neoplasms, colon, Primary insomnia, Depression, unspecified depression type, Gastroesophageal reflux disease without esophagitis, Allergic conjunctivitis, bilateral, Tobacco dependence syndrome       metoprolol tartrate 100 MG tablet    LOPRESSOR    60 tablet    Take 1 tablet (100 mg) by mouth daily    Essential hypertension, Chronic pain syndrome, Cervicalgia, Chronic bilateral low back pain with right-sided sciatica, Encounter for screening mammogram for breast cancer, Special screening for malignant neoplasms, colon, Primary insomnia, Depression, unspecified depression type, Gastroesophageal reflux disease without esophagitis, Allergic conjunctivitis, bilateral, Tobacco dependence syndrome       naproxen 500 MG tablet    NAPROSYN    60 tablet    Take 1 tablet (500 mg) by mouth 2 times daily    Chronic pain syndrome, Cervicalgia, Chronic bilateral low back pain with right-sided sciatica, Encounter for screening mammogram for breast cancer, Special screening for malignant neoplasms, colon, Essential hypertension, Primary insomnia, Depression, unspecified depression type, Gastroesophageal reflux disease without esophagitis, Allergic conjunctivitis, bilateral, Tobacco dependence syndrome       nicotine 21 MG/24HR 24 hr patch    NICODERM CQ    30 patch    Place 1 patch onto the skin every 24 hours    Tobacco dependence syndrome, Chronic pain syndrome, Cervicalgia, Chronic bilateral low back pain with right-sided sciatica, Encounter for screening mammogram for breast cancer, Special screening for malignant neoplasms, colon, Essential hypertension, Primary insomnia, Depression, unspecified depression type, Gastroesophageal reflux disease without esophagitis, Allergic conjunctivitis, bilateral       omeprazole 20 MG CR capsule    priLOSEC    30 capsule    Take 1  capsule (20 mg) by mouth daily    Gastroesophageal reflux disease without esophagitis, Chronic pain syndrome, Cervicalgia, Chronic bilateral low back pain with right-sided sciatica, Encounter for screening mammogram for breast cancer, Special screening for malignant neoplasms, colon, Essential hypertension, Primary insomnia, Depression, unspecified depression type, Allergic conjunctivitis, bilateral, Tobacco dependence syndrome       Quad Cane Misc      Single Point Cane for home and community use.        spironolactone 25 MG tablet    ALDACTONE    30 tablet    Take 1 tablet (25 mg) by mouth daily    Essential hypertension, Chronic pain syndrome, Cervicalgia, Chronic bilateral low back pain with right-sided sciatica, Encounter for screening mammogram for breast cancer, Special screening for malignant neoplasms, colon, Primary insomnia, Depression, unspecified depression type, Gastroesophageal reflux disease without esophagitis, Allergic conjunctivitis, bilateral, Tobacco dependence syndrome       trimethoprim-polymyxin b ophthalmic solution    POLYTRIM    1 Bottle    Place 1 drop into both eyes 4 times daily    Allergic conjunctivitis, bilateral, Chronic pain syndrome, Cervicalgia, Chronic bilateral low back pain with right-sided sciatica, Encounter for screening mammogram for breast cancer, Special screening for malignant neoplasms, colon, Essential hypertension, Primary insomnia, Depression, unspecified depression type, Gastroesophageal reflux disease without esophagitis, Tobacco dependence syndrome       zolpidem 5 MG tablet    AMBIEN    60 tablet    Take 1 tablet (5 mg) by mouth At Bedtime    Primary insomnia

## 2018-04-01 LAB — PAIN DRUG SCR UR W RPTD MEDS: NORMAL

## 2018-04-09 ENCOUNTER — TRANSFERRED RECORDS (OUTPATIENT)
Dept: HEALTH INFORMATION MANAGEMENT | Facility: CLINIC | Age: 53
End: 2018-04-09

## 2018-04-18 ENCOUNTER — TRANSFERRED RECORDS (OUTPATIENT)
Dept: HEALTH INFORMATION MANAGEMENT | Facility: CLINIC | Age: 53
End: 2018-04-18

## 2018-04-25 ENCOUNTER — OFFICE VISIT (OUTPATIENT)
Dept: PALLIATIVE MEDICINE | Facility: CLINIC | Age: 53
End: 2018-04-25
Payer: COMMERCIAL

## 2018-04-25 VITALS
DIASTOLIC BLOOD PRESSURE: 67 MMHG | HEART RATE: 60 BPM | SYSTOLIC BLOOD PRESSURE: 109 MMHG | BODY MASS INDEX: 27.83 KG/M2 | WEIGHT: 163 LBS | HEIGHT: 64 IN

## 2018-04-25 DIAGNOSIS — M54.16 LUMBAR RADICULOPATHY: ICD-10-CM

## 2018-04-25 DIAGNOSIS — M79.2 NEUROGENIC PAIN DUE TO CENTRAL NERVOUS SYSTEM ABNORMALITY FOLLOWING STROKE: ICD-10-CM

## 2018-04-25 DIAGNOSIS — I69.398 NEUROGENIC PAIN DUE TO CENTRAL NERVOUS SYSTEM ABNORMALITY FOLLOWING STROKE: ICD-10-CM

## 2018-04-25 DIAGNOSIS — M79.18 MYOFASCIAL MUSCLE PAIN: ICD-10-CM

## 2018-04-25 DIAGNOSIS — M47.817 LUMBOSACRAL SPONDYLOSIS WITHOUT MYELOPATHY: ICD-10-CM

## 2018-04-25 DIAGNOSIS — M96.1 FAILED BACK SYNDROME: Primary | ICD-10-CM

## 2018-04-25 PROCEDURE — 99244 OFF/OP CNSLTJ NEW/EST MOD 40: CPT | Performed by: PAIN MEDICINE

## 2018-04-25 RX ORDER — ERGOCALCIFEROL 1.25 MG/1
50000 CAPSULE, LIQUID FILLED ORAL
COMMUNITY
End: 2018-11-27

## 2018-04-25 ASSESSMENT — PAIN SCALES - GENERAL: PAINLEVEL: EXTREME PAIN (8)

## 2018-04-25 NOTE — PROGRESS NOTES
"[facility] Consultation    Date of visit: 4/25/2018    Reason for consultation:    Primary Care Provider is Adam Mcdonald.  Pain medications are being prescribed by PCP and [place].    Please see the [facility] health questionnaire which the patient completed and reviewed with me in detail.  Patient is a Anuradha Gray is a 53 year old femalewho I was asked to see in consultation by Adam Mcdonald for evaluation of chronic lumbar and cervical pain.  Specifically concerning indication for long-acting  opioids.  Chief Complaint   Patient presents with     Pain   .   Chief Complaint:    Chief Complaint   Patient presents with     Pain       Pain history:    Pt seen at [facility] where she was seen for her chronic pain.  Of note patient recently transferred care to [facility].  Reviewing  there are prescriptions from both [facility] provider and [facility].  Patient signed an opioid agreement with [facility] 3/18.  Patient is also being seen by [facility].  Of note I cannot find any recent notes from [facility](6/17), although patient received Rx from [facility] on 4/19. The pt seen in past at [facility](8/17 with concerns about motives \"interest in not traveling\"     Anuradha Gray is a 53 year old female with history of CVA, history of cervical ACDF,  Hx of sclioisis repair in 1981.  who first started having problems with pain >10 yrs ago. The pt had acdf 2014. Prior to surgery had radiating symptoms to her RUE. The pt had a stroke after the procedure w/ R arm weakness. She cont to PHYSICAL THERAPY w/ some improvement.  Currently the pt has diffuse pain. Today most of her pain radiates from her LBP across her lat thigh to the plantar surface of her foot. The pain is acute on chronic LBP. The pain is constant. The pain is an intense stabbing pain. + numbness/burning/tingling. Denies any overt progressive weakness. She denies any incontinence. The pain is " worse bending or standing for extended periods of time. The pain is with walking . THe pain is slightly better with rest. Of note has not done PHYSICAL THERAPY  In a long time. Pt feels the short acting helps more than long acting.   The pt had had multiple epidurals in the past. With limited relief. The most recent epidural was a couple of weeks ago w/ benefit for 2 weeks. The pt  Not sure what type of procedure was preformed.     Currently the pt taking gabapentin , celexa, cymbalta, norco  6 tabs a day, Naproxen  OxyContin  Pain rating: intensity  Averages 7/10 on a 0-10 scale.    Any bowel or bladder incontinence: no        Other treatments have included:  Anuradha DAWSON Gray has been seen at a pain clinic in the past.    PT: minimal benefit        Past Medical History:  No past medical history on file.  Past Surgical History:  No past surgical history on file.  Medications:  Current Outpatient Prescriptions   Medication Sig Dispense Refill     amLODIPine (NORVASC) 10 MG tablet Take 1 tablet (10 mg) by mouth daily 30 tablet 3     atorvastatin (LIPITOR) 80 MG tablet Take 1 tablet (80 mg) by mouth At Bedtime 30 tablet 3     citalopram (CELEXA) 20 MG tablet Take 1 tablet (20 mg) by mouth daily 60 tablet 3     cyanocobalamin (RA VITAMIN B-12 TR) 1000 MCG TBCR Take 1,000 mcg by mouth daily 30 tablet 3     cyclobenzaprine (FLEXERIL) 10 MG tablet Take 1 tablet (10 mg) by mouth daily 42 tablet 3     diclofenac (VOLTAREN) 1 % GEL topical gel Apply topically 4 times daily 100 g 3     DULoxetine (CYMBALTA) 60 MG EC capsule Take 1 capsule (60 mg) by mouth daily 30 capsule 1     fa-pyridoxine-cyancobalamin 2.5-25-2 MG TABS per tablet Take 1 tablet by mouth daily 30 each 3     gabapentin (NEURONTIN) 300 MG capsule Take 4 capsules (1,200 mg) by mouth 3 times daily 360 capsule 3     HYDROcodone-acetaminophen (NORCO)  MG per tablet Take 1 tablet by mouth every 4 hours as needed for moderate to severe pain 180 tablet 0  "    losartan (COZAAR) 100 MG tablet Take 1 tablet (100 mg) by mouth daily 30 tablet 3     metoprolol tartrate (LOPRESSOR) 100 MG tablet Take 1 tablet (100 mg) by mouth daily 60 tablet 0     Misc. Devices (QUAD CANE) MISC Single Point Cane for home and community use.       naproxen (NAPROSYN) 500 MG tablet Take 1 tablet (500 mg) by mouth 2 times daily 60 tablet 3     nicotine (NICODERM CQ) 21 MG/24HR 24 hr patch Place 1 patch onto the skin every 24 hours 30 patch 3     omeprazole (PRILOSEC) 20 MG CR capsule Take 1 capsule (20 mg) by mouth daily 30 capsule 3     spironolactone (ALDACTONE) 25 MG tablet Take 1 tablet (25 mg) by mouth daily 30 tablet 3     trimethoprim-polymyxin b (POLYTRIM) ophthalmic solution Place 1 drop into both eyes 4 times daily 1 Bottle 3     vitamin D (ERGOCALCIFEROL) 59238 UNIT capsule Take 50,000 Units by mouth       zolpidem (AMBIEN) 5 MG tablet Take 1 tablet (5 mg) by mouth At Bedtime 60 tablet 0     Allergies:     Allergies   Allergen Reactions     Lisinopril Cough     Social History:    Alcohol use: no  Drug use: no  History of chemical dependency treatment: no    Family history:  No family history on file.  Family history of headaches: no    Review of Systems:  Skin: negative  Eyes: negative  Ears/Nose/Throat: negative  Respiratory: No shortness of breath, dyspnea on exertion, cough, or hemoptysis  Cardiovascular: negative  Gastrointestinal: negative  Genitourinary: negative  Musculoskeletal: negative  Neurologic: negative  Psychiatric: negative  Hematologic/Lymphatic/Immunologic: negative  Endocrine: negative    Physical Exam:  Vitals:    04/25/18 1013   BP: 109/67   Pulse: 60   Weight: 73.9 kg (163 lb)   Height: 1.626 m (5' 4\")     Exam:  Constitutional: healthy, alert and no distress  Head: normocephalic. Atraumatic.  Eyes: no redness or jaundice noted   ENT: oropharnx normal.  MMM.  Neck supple.    Cardiovascular: Negative JVD  Respiratory: cspeaking in full " sentences  Gastrointestinal: soft, non-tender, normoactive bowel sounds   Skin: no suspicious lesions or rashes  Psychiatric: mentation appears normal and affect normal/bright    Musculoskeletal exam:  Gait/Station/Posture: wnl  Cervical spine: ROM decreased       Thoracic spine:  Normal     Lumbar spine:     ROM: Decreased   Myofascial tenderness: Diffuse   Lewis's: Positive               Straight leg exam: Negative   MASOUD/FADIR: Negative              SI: Positive   Greater trochanteric bursa: Negative  Neurologic exam:  CN:  Cranial nerves 2-12 are normal  Motor:  5/5 LE strength  Reflexes:        Patella:  +2   Achilles:  +2    Sensory:  (upper and lower extremities):   Light touch: Decreased right upper extremity   Allodynia:  Diffuse specifically over her surgical incision   Dysethesia: Diffuse specifically over her surgical incision   Hyperalgesia: Diffuse specifically over her surgical incision    Diagnostic tests:      Assessment/Plan:  Anuradha Gray is a 53 year old female who presents with the complaints of neck/back/total body pain.   Anuradha was seen today for pain.    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 Would consider adding lamictal.          Total time spent was 75 minutes, and more than 50% of face to face time was spent counseling and/or coordination of care regarding principles of multidisciplinary care and medication management  neck/back/total body pain.        Bony Murphy MD  [facility]

## 2018-04-25 NOTE — NURSING NOTE
"Chief Complaint   Patient presents with     Pain       Initial /67  Pulse 60  Ht 1.626 m (5' 4\")  Wt 73.9 kg (163 lb)  BMI 27.98 kg/m2 Estimated body mass index is 27.98 kg/(m^2) as calculated from the following:    Height as of this encounter: 1.626 m (5' 4\").    Weight as of this encounter: 73.9 kg (163 lb).  Medication Reconciliation: complete     Estephania Hall MA      "

## 2018-04-25 NOTE — MR AVS SNAPSHOT
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    ----------------------------------------------------------------  Nurse Triage line:  [phone]   Call this number with any questions or concerns. You may leave a detailed message anytime. Calls are typically returned Monday through Friday between 8 AM and 4:30 PM. We usually get back to you within 2 business days depending on the issue/request.       Medication refills:    For non-narcotic medications, call your pharmacy directly to request a refill. The pharmacy will contact the [facility] Management Center for authorization. Please allow 3-4 days for these refills to be processed.     For narcotic refills, call the nurse triage line or send a [employer] message. Please contact us 7-10 days before your refill is due. The message MUST include the name of the specific medication(s) requested and how you would like to receive the prescription(s). The options are as follows:    Pain Clinic staff can mail the prescription to your pharmacy. Please tell us the name of the pharmacy.    You may pick the prescription up at the Pain Clinic (tell us  the location) or during a clinic visit with your pain provider    Pain Clinic staff can deliver the prescription to the [place] pharmacy in the clinic building. Please tell us the location.      Scheduling number: [phone].  Call this number to schedule or change appointments.    We believe regular attendance is key to your success in our program.    Any time you are unable to keep your appointment we ask that you call us at least 24 hours in advance to let us know. This will allow us to offer the appointment time to another patient.               Follow-ups after your visit        Your next 10 appointments already scheduled     May 02, 2018  1:15 PM CDT   (Arrive by 1:00 PM)   MA SCREENING DIGITAL BILATERAL with [id number]   [facility]    [address] [place] [id number] [phone]           Do not use any powder, lotion or deodorant under your arms or on your breast. If you do, we will ask you to remove it before your exam.  Wear comfortable, two-piece clothing.  If you have any allergies, tell your care team.  Bring any previous mammograms from other facilities or have them mailed to the breast center.              Who to contact     If you have questions or need follow up information about today's clinic visit or your schedule please contact [facility] directly at [phone].  Normal or non-critical lab and imaging results will be communicated to you by MyChart, letter or phone within 4 business days after the clinic has received the results. If you do not hear from us within 7 days, please contact the clinic through MyChart or phone. If you have a critical or abnormal lab result, we will notify you by phone as soon as possible.  Submit refill requests through [employer] or call your pharmacy and they will forward the refill request to us. Please allow 3 business days for your refill to be completed.          Additional Information  "About Your Visit        MyChart Information     [employer] lets you send messages to your doctor, view your test results, renew your prescriptions, schedule appointments and more. To sign up, go to [email or web address] . Click on \"Log in\" on the left side of the screen, which will take you to the Welcome page. Then click on \"Sign up Now\" on the right side of the page.     You will be asked to enter the access code listed below, as well as some personal information. Please follow the directions to create your username and password.     Your access code is: [email or web address]  Expires: 2018  4:07 PM     Your access code will  in 90 days. If you need help or a new code, please call your [place] clinic or [phone].        Care EveryWhere ID     This is your Care EveryWhere ID. This could be used by other organizations to access your [place] medical records  [id number]        Your Vitals Were     Pulse Height BMI (Body Mass Index)             60 1.626 m (5' 4\") 27.98 kg/m2          Blood Pressure from Last 3 Encounters:   18 109/67   18 124/80   18 122/76    Weight from Last 3 Encounters:   18 73.9 kg (163 lb)   18 74.8 kg (165 lb)   18 75 kg (165 lb 6.4 oz)              Today, you had the following     No orders found for display       Primary Care Provider Office Phone # Fax #    Adam Mcdonald PA-C [phone] [address] [place], [place] [address]        Equal Access to Services     FLASH HATCH : Hadalecia Long, jose alberto watkins, qamykel weeks. So [facility] [phone].    ATENCIÓN: Si habla español, tiene a hollingsworth disposición servicios gratuitos de asistencia lingüística. Gael al [phone].    We comply with applicable federal civil rights laws and [place] laws. We do not discriminate on the basis of race, color, national origin, age, disability, sex, sexual orientation, or gender " identity.            Thank you!     Thank you for choosing [facility]  for your care. Our goal is always to provide you with excellent care. Hearing back from our patients is one way we can continue to improve our services. Please take a few minutes to complete the written survey that you may receive in the mail after your visit with us. Thank you!             Your Updated Medication List - Protect others around you: Learn how to safely use, store and throw away your medicines at www.disposemymeds.org.          This list is accurate as of 4/25/18 11:26 AM.  Always use your most recent med list.                   Brand Name Dispense Instructions for use Diagnosis    amLODIPine 10 MG tablet    NORVASC    30 tablet    Take 1 tablet (10 mg) by mouth daily    Essential hypertension, Chronic pain syndrome, Cervicalgia, Chronic bilateral low back pain with right-sided sciatica, Encounter for screening mammogram for breast cancer, Special screening for malignant neoplasms, colon, Primary insomnia, Depression, unspecified depression type, Gastroesophageal reflux disease without esophagitis, Allergic conjunctivitis, bilateral, Tobacco dependence syndrome       atorvastatin 80 MG tablet    LIPITOR    30 tablet    Take 1 tablet (80 mg) by mouth At Bedtime    Essential hypertension, Chronic pain syndrome, Cervicalgia, Chronic bilateral low back pain with right-sided sciatica, Encounter for screening mammogram for breast cancer, Special screening for malignant neoplasms, colon, Primary insomnia, Depression, unspecified depression type, Gastroesophageal reflux disease without esophagitis, Allergic conjunctivitis, bilateral, Tobacco dependence syndrome       citalopram 20 MG tablet    celeXA    60 tablet    Take 1 tablet (20 mg) by mouth daily    Depression, unspecified depression type, Chronic pain syndrome, Cervicalgia, Chronic bilateral low back pain with right-sided sciatica, Encounter for screening mammogram for  breast cancer, Special screening for malignant neoplasms, colon, Essential hypertension, Primary insomnia, Gastroesophageal reflux disease without esophagitis, Allergic conjunctivitis, bilateral, Tobacco dependence syndrome       cyanocobalamin 1000 MCG Tbcr    RA VITAMIN B-12 TR    30 tablet    Take 1,000 mcg by mouth daily    Chronic pain syndrome, Cervicalgia, Chronic bilateral low back pain with right-sided sciatica, Encounter for screening mammogram for breast cancer, Special screening for malignant neoplasms, colon, Essential hypertension, Primary insomnia, Depression, unspecified depression type, Gastroesophageal reflux disease without esophagitis, Allergic conjunctivitis, bilateral, Tobacco dependence syndrome       cyclobenzaprine 10 MG tablet    FLEXERIL    42 tablet    Take 1 tablet (10 mg) by mouth daily    Chronic pain syndrome, Cervicalgia, Chronic bilateral low back pain with right-sided sciatica, Encounter for screening mammogram for breast cancer, Special screening for malignant neoplasms, colon, Essential hypertension, Primary insomnia, Depression, unspecified depression type, Gastroesophageal reflux disease without esophagitis, Allergic conjunctivitis, bilateral, Tobacco dependence syndrome       diclofenac 1 % Gel topical gel    VOLTAREN    100 g    Apply topically 4 times daily    Chronic pain syndrome, Cervicalgia, Chronic bilateral low back pain with right-sided sciatica, Encounter for screening mammogram for breast cancer, Special screening for malignant neoplasms, colon, Essential hypertension, Primary insomnia, Depression, unspecified depression type, Gastroesophageal reflux disease without esophagitis, Allergic conjunctivitis, bilateral, Tobacco dependence syndrome       DULoxetine 60 MG EC capsule    CYMBALTA    30 capsule    Take 1 capsule (60 mg) by mouth daily    Chronic pain syndrome, Cervicalgia, Chronic bilateral low back pain with right-sided sciatica        fa-pyridoxine-cyancobalamin 2.5-25-2 MG Tabs per tablet     30 each    Take 1 tablet by mouth daily    Chronic pain syndrome, Cervicalgia, Chronic bilateral low back pain with right-sided sciatica, Encounter for screening mammogram for breast cancer, Special screening for malignant neoplasms, colon, Essential hypertension, Primary insomnia, Depression, unspecified depression type, Gastroesophageal reflux disease without esophagitis, Allergic conjunctivitis, bilateral, Tobacco dependence syndrome       gabapentin 300 MG capsule    NEURONTIN    360 capsule    Take 4 capsules (1,200 mg) by mouth 3 times daily    Chronic pain syndrome, Cervicalgia, Chronic bilateral low back pain with right-sided sciatica, Encounter for screening mammogram for breast cancer, Special screening for malignant neoplasms, colon, Essential hypertension, Primary insomnia, Depression, unspecified depression type, Gastroesophageal reflux disease without esophagitis, Allergic conjunctivitis, bilateral, Tobacco dependence syndrome       HYDROcodone-acetaminophen  MG per tablet    NORCO    180 tablet    Take 1 tablet by mouth every 4 hours as needed for moderate to severe pain    Cervicalgia, Chronic bilateral low back pain with right-sided sciatica, Chronic pain syndrome       losartan 100 MG tablet    COZAAR    30 tablet    Take 1 tablet (100 mg) by mouth daily    Essential hypertension, Chronic pain syndrome, Cervicalgia, Chronic bilateral low back pain with right-sided sciatica, Encounter for screening mammogram for breast cancer, Special screening for malignant neoplasms, colon, Primary insomnia, Depression, unspecified depression type, Gastroesophageal reflux disease without esophagitis, Allergic conjunctivitis, bilateral, Tobacco dependence syndrome       metoprolol tartrate 100 MG tablet    LOPRESSOR    60 tablet    Take 1 tablet (100 mg) by mouth daily    Essential hypertension, Chronic pain syndrome, Cervicalgia, Chronic bilateral  low back pain with right-sided sciatica, Encounter for screening mammogram for breast cancer, Special screening for malignant neoplasms, colon, Primary insomnia, Depression, unspecified depression type, Gastroesophageal reflux disease without esophagitis, Allergic conjunctivitis, bilateral, Tobacco dependence syndrome       naproxen 500 MG tablet    NAPROSYN    60 tablet    Take 1 tablet (500 mg) by mouth 2 times daily    Chronic pain syndrome, Cervicalgia, Chronic bilateral low back pain with right-sided sciatica, Encounter for screening mammogram for breast cancer, Special screening for malignant neoplasms, colon, Essential hypertension, Primary insomnia, Depression, unspecified depression type, Gastroesophageal reflux disease without esophagitis, Allergic conjunctivitis, bilateral, Tobacco dependence syndrome       nicotine 21 MG/24HR 24 hr patch    NICODERM CQ    30 patch    Place 1 patch onto the skin every 24 hours    Tobacco dependence syndrome, Chronic pain syndrome, Cervicalgia, Chronic bilateral low back pain with right-sided sciatica, Encounter for screening mammogram for breast cancer, Special screening for malignant neoplasms, colon, Essential hypertension, Primary insomnia, Depression, unspecified depression type, Gastroesophageal reflux disease without esophagitis, Allergic conjunctivitis, bilateral       omeprazole 20 MG CR capsule    priLOSEC    30 capsule    Take 1 capsule (20 mg) by mouth daily    Gastroesophageal reflux disease without esophagitis, Chronic pain syndrome, Cervicalgia, Chronic bilateral low back pain with right-sided sciatica, Encounter for screening mammogram for breast cancer, Special screening for malignant neoplasms, colon, Essential hypertension, Primary insomnia, Depression, unspecified depression type, Allergic conjunctivitis, bilateral, Tobacco dependence syndrome       Quad Cane Misc      Single Point Cane for home and community use.        spironolactone 25 MG tablet     ALDACTONE    30 tablet    Take 1 tablet (25 mg) by mouth daily    Essential hypertension, Chronic pain syndrome, Cervicalgia, Chronic bilateral low back pain with right-sided sciatica, Encounter for screening mammogram for breast cancer, Special screening for malignant neoplasms, colon, Primary insomnia, Depression, unspecified depression type, Gastroesophageal reflux disease without esophagitis, Allergic conjunctivitis, bilateral, Tobacco dependence syndrome       trimethoprim-polymyxin b ophthalmic solution    POLYTRIM    1 Bottle    Place 1 drop into both eyes 4 times daily    Allergic conjunctivitis, bilateral, Chronic pain syndrome, Cervicalgia, Chronic bilateral low back pain with right-sided sciatica, Encounter for screening mammogram for breast cancer, Special screening for malignant neoplasms, colon, Essential hypertension, Primary insomnia, Depression, unspecified depression type, Gastroesophageal reflux disease without esophagitis, Tobacco dependence syndrome       vitamin D 08824 UNIT capsule    ERGOCALCIFEROL     Take 50,000 Units by mouth        zolpidem 5 MG tablet    AMBIEN    60 tablet    Take 1 tablet (5 mg) by mouth At Bedtime    Primary insomnia

## 2018-04-25 NOTE — PATIENT INSTRUCTIONS
- Further procedures recommended:    - would hold on further injections  - Medication Management:    - Currently have no records from [facility] after 6/17, so unable to make full assessment on long acting opioid.    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    ----------------------------------------------------------------  Nurse Triage line:  [phone]   Call this number with any questions or concerns. You may leave a detailed message anytime. Calls are typically returned Monday through Friday between 8 AM and 4:30 PM. We usually get back to you within 2 business days depending on the issue/request.       Medication refills:    For non-narcotic medications, call your pharmacy directly to request a refill. The pharmacy will contact the [facility] for authorization. Please allow 3-4 days for these refills to be processed.     For narcotic refills, call the nurse triage line or send a RADSONE message. Please contact us 7-10 days before your refill is due. The message MUST include the name of the specific medication(s) requested and how you would like to receive the prescription(s). The options are as follows:    Pain Clinic staff can mail the prescription to your pharmacy. Please tell us the name of the pharmacy.    You may pick the prescription up at the Pain Clinic (tell us the location) or during a clinic visit with your pain provider    Pain Clinic staff can deliver the prescription to the [place] pharmacy in the clinic building. Please tell us the location.      Scheduling number: [phone].  Call this number to schedule or change appointments.    We believe regular attendance is key to your success in our program.    Any time you are unable to keep your appointment we ask that you call us at least 24 hours in advance to let us know. This will allow us to offer the appointment time to another patient.

## 2018-04-26 ASSESSMENT — PATIENT HEALTH QUESTIONNAIRE - PHQ9: SUM OF ALL RESPONSES TO PHQ QUESTIONS 1-9: 5

## 2018-05-08 DIAGNOSIS — M54.41 CHRONIC BILATERAL LOW BACK PAIN WITH RIGHT-SIDED SCIATICA: ICD-10-CM

## 2018-05-08 DIAGNOSIS — F32.A DEPRESSION, UNSPECIFIED DEPRESSION TYPE: ICD-10-CM

## 2018-05-08 DIAGNOSIS — H10.13 ALLERGIC CONJUNCTIVITIS, BILATERAL: ICD-10-CM

## 2018-05-08 DIAGNOSIS — I10 ESSENTIAL HYPERTENSION: ICD-10-CM

## 2018-05-08 DIAGNOSIS — G89.4 CHRONIC PAIN SYNDROME: ICD-10-CM

## 2018-05-08 DIAGNOSIS — F51.01 PRIMARY INSOMNIA: ICD-10-CM

## 2018-05-08 DIAGNOSIS — Z12.11 SPECIAL SCREENING FOR MALIGNANT NEOPLASMS, COLON: ICD-10-CM

## 2018-05-08 DIAGNOSIS — Z12.31 ENCOUNTER FOR SCREENING MAMMOGRAM FOR BREAST CANCER: ICD-10-CM

## 2018-05-08 DIAGNOSIS — K21.9 GASTROESOPHAGEAL REFLUX DISEASE WITHOUT ESOPHAGITIS: ICD-10-CM

## 2018-05-08 DIAGNOSIS — F17.200 TOBACCO DEPENDENCE SYNDROME: ICD-10-CM

## 2018-05-08 DIAGNOSIS — G89.29 CHRONIC BILATERAL LOW BACK PAIN WITH RIGHT-SIDED SCIATICA: ICD-10-CM

## 2018-05-08 DIAGNOSIS — M54.2 CERVICALGIA: ICD-10-CM

## 2018-05-08 RX ORDER — DULOXETIN HYDROCHLORIDE 60 MG/1
CAPSULE, DELAYED RELEASE ORAL
Qty: 30 CAPSULE | Refills: 5 | Status: SHIPPED | OUTPATIENT
Start: 2018-05-08 | End: 2018-11-27

## 2018-05-08 RX ORDER — METOPROLOL TARTRATE 100 MG
TABLET ORAL
Qty: 90 TABLET | Refills: 1 | Status: SHIPPED | OUTPATIENT
Start: 2018-05-08 | End: 2018-11-01

## 2018-05-09 NOTE — TELEPHONE ENCOUNTER
zolpidem (AMBIEN) 5 MG tablet      Last Written Prescription Date:  3/8/18  Last Fill Quantity: 60,   # refills: 0  Last Office Visit: 3/28/18  Future Office visit:       Routing refill request to provider for review/approval because:  Drug not on the [facility], [facility] or [facility] refill protocol or controlled substance

## 2018-05-10 RX ORDER — ZOLPIDEM TARTRATE 5 MG/1
TABLET ORAL
Qty: 60 TABLET | Refills: 0 | Status: SHIPPED | OUTPATIENT
Start: 2018-05-10 | End: 2018-07-26

## 2018-05-11 ENCOUNTER — OFFICE VISIT (OUTPATIENT)
Dept: FAMILY MEDICINE | Facility: CLINIC | Age: 53
End: 2018-05-11
Payer: COMMERCIAL

## 2018-05-11 VITALS
HEIGHT: 64 IN | DIASTOLIC BLOOD PRESSURE: 70 MMHG | RESPIRATION RATE: 16 BRPM | TEMPERATURE: 98.2 F | HEART RATE: 72 BPM | SYSTOLIC BLOOD PRESSURE: 124 MMHG | BODY MASS INDEX: 27.66 KG/M2 | WEIGHT: 162 LBS

## 2018-05-11 DIAGNOSIS — N30.00 ACUTE CYSTITIS WITHOUT HEMATURIA: ICD-10-CM

## 2018-05-11 DIAGNOSIS — R39.89 URINARY PROBLEM: Primary | ICD-10-CM

## 2018-05-11 DIAGNOSIS — R82.90 NONSPECIFIC FINDING ON EXAMINATION OF URINE: ICD-10-CM

## 2018-05-11 LAB
ALBUMIN UR-MCNC: 100 MG/DL
APPEARANCE UR: ABNORMAL
BACTERIA #/AREA URNS HPF: ABNORMAL /HPF
BILIRUB UR QL STRIP: ABNORMAL
COLOR UR AUTO: ABNORMAL
GLUCOSE UR STRIP-MCNC: NEGATIVE MG/DL
HGB UR QL STRIP: NEGATIVE
KETONES UR STRIP-MCNC: 15 MG/DL
LEUKOCYTE ESTERASE UR QL STRIP: ABNORMAL
MUCOUS THREADS #/AREA URNS LPF: PRESENT /LPF
NITRATE UR QL: NEGATIVE
NON-SQ EPI CELLS #/AREA URNS LPF: ABNORMAL /LPF
PH UR STRIP: 5.5 PH (ref 5–7)
RBC #/AREA URNS AUTO: ABNORMAL /HPF
SOURCE: ABNORMAL
SP GR UR STRIP: 1.02 (ref 1–1.03)
UROBILINOGEN UR STRIP-ACNC: 0.2 EU/DL (ref 0.2–1)
WBC #/AREA URNS AUTO: ABNORMAL /HPF

## 2018-05-11 PROCEDURE — 87086 URINE CULTURE/COLONY COUNT: CPT | Performed by: PHYSICIAN ASSISTANT

## 2018-05-11 PROCEDURE — 81001 URINALYSIS AUTO W/SCOPE: CPT | Performed by: PHYSICIAN ASSISTANT

## 2018-05-11 PROCEDURE — 99214 OFFICE O/P EST MOD 30 MIN: CPT | Performed by: PHYSICIAN ASSISTANT

## 2018-05-11 RX ORDER — OXYCODONE HYDROCHLORIDE 15 MG/1
20 TABLET, FILM COATED, EXTENDED RELEASE ORAL EVERY 12 HOURS
Refills: 0 | COMMUNITY
Start: 2018-05-11 | End: 2019-06-27

## 2018-05-11 RX ORDER — OXYCODONE HYDROCHLORIDE 15 MG/1
15 TABLET ORAL 2 TIMES DAILY
COMMUNITY
End: 2018-05-11

## 2018-05-11 RX ORDER — SULFAMETHOXAZOLE/TRIMETHOPRIM 800-160 MG
1 TABLET ORAL 2 TIMES DAILY
Qty: 14 TABLET | Refills: 0 | Status: SHIPPED | OUTPATIENT
Start: 2018-05-11 | End: 2018-07-26

## 2018-05-11 ASSESSMENT — ENCOUNTER SYMPTOMS
VOMITING: 0
LOSS OF CONSCIOUSNESS: 0
MYALGIAS: 1
NECK PAIN: 0
ABDOMINAL PAIN: 0
EYE PAIN: 0
EYE DISCHARGE: 0
INSOMNIA: 0
HEMOPTYSIS: 0
PALPITATIONS: 0
HALLUCINATIONS: 0
FOCAL WEAKNESS: 0
NAUSEA: 0
BLOOD IN STOOL: 0
COUGH: 0
TINGLING: 0
SHORTNESS OF BREATH: 0
DOUBLE VISION: 0
BACK PAIN: 1
WEAKNESS: 0
DYSURIA: 0
DIAPHORESIS: 0
SPUTUM PRODUCTION: 0
FREQUENCY: 0
NEUROLOGICAL NEGATIVE: 1
BLURRED VISION: 0
DIARRHEA: 0
FEVER: 0
NERVOUS/ANXIOUS: 0
HEADACHES: 0
HEARTBURN: 0
DEPRESSION: 0
DIZZINESS: 0
SEIZURES: 0
WHEEZING: 0
WEIGHT LOSS: 0
CONSTIPATION: 0
SORE THROAT: 0
ORTHOPNEA: 0
SENSORY CHANGE: 0
EYE REDNESS: 0
PHOTOPHOBIA: 0

## 2018-05-11 ASSESSMENT — LIFESTYLE VARIABLES: SUBSTANCE_ABUSE: 0

## 2018-05-11 NOTE — NURSING NOTE
"Chief Complaint   Patient presents with     Urinary Problem     Results     Patient is here to follow up with lab results done at her pain clinic.       Initial /70 (BP Location: Right arm, Cuff Size: Adult Large)  Pulse 72  Temp 98.2  F (36.8  C) (Tympanic)  Resp 16  Ht 5' 4\" (1.626 m)  Wt 162 lb (73.5 kg)  BMI 27.81 kg/m2 Estimated body mass index is 27.81 kg/(m^2) as calculated from the following:    Height as of this encounter: 5' 4\" (1.626 m).    Weight as of this encounter: 162 lb (73.5 kg).      Health Maintenance that is potentially due pending provider review:  Mammogram, Pap Smear and Colonoscopy/FIT    Mammogram is scheduled. Gave patient number/info for colonoscopy. She will call to schedule her physical with pap.    Is there anyone who you would like to be able to receive your results? No  If yes have patient fill out MINGO      "

## 2018-05-11 NOTE — MR AVS SNAPSHOT
After Visit Summary   5/11/2018    Anuradha Gray    MRN: [id number]           Patient Information     Date Of Birth          1965        Visit Information        Provider Department      5/11/2018 1:40 PM Adam Mcdonald PA-C [facility]        Today's Diagnoses     Urinary problem    -  1    Nonspecific finding on examination of urine        Acute cystitis without hematuria           Follow-ups after your visit        Follow-up notes from your care team     Return if symptoms worsen or fail to improve.      Your next 10 appointments already scheduled     May 19, 2018 10:15 AM CDT   (Arrive by 10:00 AM)   MA SCREENING DIGITAL BILATERAL with [facility]   [facility]    [address] [place] [address] [phone]           Do not use any powder, lotion or deodorant under your arms or on your breast. If you do, we will ask you to remove it before your exam.  Wear comfortable, two-piece clothing.  If you have any allergies, tell your care team.  Bring any previous mammograms from other facilities or have them mailed to the breast center.              Who to contact     If you have questions or need follow up information about today's clinic visit or your schedule please contact [facility] directly at [phone].  Normal or non-critical lab and imaging results will be communicated to you by MyChart, letter or phone within 4 business days after the clinic has received the results. If you do not hear from us within 7 days, please contact the clinic through [employer] [facility] or phone. If you have a critical or abnormal lab result, we will notify you by phone as soon as possible.  Submit refill requests through [employer] or call your pharmacy and they will forward the refill request to us. Please allow 3 business days for your refill to be completed.          Additional Information About Your Visit        [employer] [facility]  "Information     ServiceFrame lets you send messages to your doctor, view your test results, renew your prescriptions, schedule appointments and more. To sign up, go to [email or web address] . Click on \"Log in\" on the left side of the screen, which will take you to the Welcome page. Then click on \"Sign up Now\" on the right side of the page.     You will be asked to enter the access code listed below, as well as some personal information. Please follow the directions to create your username and password.     Your access code is: [email or web address]  Expires: 2018  4:07 PM     Your access code will  in 90 days. If you need help or a new code, please call your [facility] clinic or [phone].        Care EveryWhere ID     This is your [facility] EveryWhere ID. This could be used by other organizations to access your [facility] medical records  [id number]        Your Vitals Were     Pulse Temperature Respirations Height BMI (Body Mass Index)       72 98.2  F (36.8  C) (Tympanic) 16 5' 4\" (1.626 m) 27.81 kg/m2        Blood Pressure from Last 3 Encounters:   18 124/70   18 109/67   18 124/80    Weight from Last 3 Encounters:   18 162 lb (73.5 kg)   18 163 lb (73.9 kg)   18 165 lb (74.8 kg)              We Performed the Following     *UA reflex to Microscopic and Culture ([facility] (except [place] and [place])     Urine Culture Aerobic Bacterial     Urine Microscopic          Today's Medication Changes          These changes are accurate as of 18  3:00 PM.  If you have any questions, ask your nurse or doctor.               Start taking these medicines.        Dose/Directions    sulfamethoxazole-trimethoprim 800-160 MG per tablet   Commonly known as:  BACTRIM DS/SEPTRA DS   Used for:  Acute cystitis without hematuria   Started by:  Adam Mcdonald PA-C        Dose:  1 tablet   Take 1 tablet by mouth 2 times daily   Quantity:  14 tablet   Refills:  0         Stop taking " these medicines if you haven't already. Please contact your care team if you have questions.     diclofenac 1 % Gel topical gel   Commonly known as:  VOLTAREN   Stopped by:  Adam Mcdonald PA-C                Where to get your medicines      These medications were sent to [facility] [id number] [place] [address] AT [facility] & [address]  [address] [facility] [address]     Phone:  [phone]     sulfamethoxazole-trimethoprim 800-160 MG per tablet               Information about OPIOIDS     PRESCRIPTION OPIOIDS: WHAT YOU NEED TO KNOW   You have a prescription for an opioid (narcotic) pain medicine. Opioids can cause addiction. If you have a history of chemical dependency of any type, you are at a higher risk of becoming addicted to opioids. Only take this medicine after all other options have been tried. Take it for as short a time and as few doses as possible.     Do not:    Drive. If you drive while taking these medicines, you could be arrested for driving under the influence (DUI).    Operate heavy machinery    Do any other dangerous activities while taking these medicines.     Drink any alcohol while taking these medicines.      Take with any other medicines that contain acetaminophen. Read all labels carefully. Look for the word  acetaminophen  or  Tylenol.  Ask your pharmacist if you have questions or are unsure.    Store your pills in a secure place, locked if possible. We will not replace any lost or stolen medicine. If you don t finish your medicine, please throw away (dispose) as directed by your pharmacist. The [place] Pollution [employer] has more information about safe disposal: https://www.pca.[facility].[place].us/living-green/managing-unwanted-medications    All opioids tend to cause constipation. Drink plenty of water and eat foods that have a lot of fiber, such as fruits, vegetables, prune juice, apple juice and high-fiber cereal. Take a laxative (Miralax, milk  of magnesia, Colace, Senna) if you don t move your bowels at least every other day.          Primary Care Provider Office Phone # Fax #    Adam Mcdonald PA-C [phone] [address] [place] [address]        Equal Access to Services     ANNABELLE HATCH : Hadii aad ku hadasho Soomaali, waaxda luqadaha, qaybta kaalmada adeegyada, waxay idiin hayaan adedaniel khruelnorman lalenny brunson. So [facility] [phone].    ATENCIÓN: Si habla español, tiene a hollingsworth disposición servicios gratuitos de asistencia lingüística. Llame al [phone].    We comply with applicable federal civil rights laws and [place] laws. We do not discriminate on the basis of race, color, national origin, age, disability, sex, sexual orientation, or gender identity.            Thank you!     Thank you for choosing [facility]  for your care. Our goal is always to provide you with excellent care. Hearing back from our patients is one way we can continue to improve our services. Please take a few minutes to complete the written survey that you may receive in the mail after your visit with us. Thank you!             Your Updated Medication List - Protect others around you: Learn how to safely use, store and throw away your medicines at www.disposemymeds.org.          This list is accurate as of 5/11/18  3:00 PM.  Always use your most recent med list.                   Brand Name Dispense Instructions for use Diagnosis    amLODIPine 10 MG tablet    NORVASC    30 tablet    Take 1 tablet (10 mg) by mouth daily    Essential hypertension, Chronic pain syndrome, Cervicalgia, Chronic bilateral low back pain with right-sided sciatica, Encounter for screening mammogram for breast cancer, Special screening for malignant neoplasms, colon, Primary insomnia, Depression, unspecified depression type, Gastroesophageal reflux disease without esophagitis, Allergic conjunctivitis, bilateral, Tobacco dependence syndrome       atorvastatin 80 MG  tablet    LIPITOR    30 tablet    Take 1 tablet (80 mg) by mouth At Bedtime    Essential hypertension, Chronic pain syndrome, Cervicalgia, Chronic bilateral low back pain with right-sided sciatica, Encounter for screening mammogram for breast cancer, Special screening for malignant neoplasms, colon, Primary insomnia, Depression, unspecified depression type, Gastroesophageal reflux disease without esophagitis, Allergic conjunctivitis, bilateral, Tobacco dependence syndrome       citalopram 20 MG tablet    celeXA    60 tablet    Take 1 tablet (20 mg) by mouth daily    Depression, unspecified depression type, Chronic pain syndrome, Cervicalgia, Chronic bilateral low back pain with right-sided sciatica, Encounter for screening mammogram for breast cancer, Special screening for malignant neoplasms, colon, Essential hypertension, Primary insomnia, Gastroesophageal reflux disease without esophagitis, Allergic conjunctivitis, bilateral, Tobacco dependence syndrome       cyanocobalamin 1000 MCG Tbcr    RA VITAMIN B-12 TR    30 tablet    Take 1,000 mcg by mouth daily    Chronic pain syndrome, Cervicalgia, Chronic bilateral low back pain with right-sided sciatica, Encounter for screening mammogram for breast cancer, Special screening for malignant neoplasms, colon, Essential hypertension, Primary insomnia, Depression, unspecified depression type, Gastroesophageal reflux disease without esophagitis, Allergic conjunctivitis, bilateral, Tobacco dependence syndrome       cyclobenzaprine 10 MG tablet    FLEXERIL    42 tablet    Take 1 tablet (10 mg) by mouth daily    Chronic pain syndrome, Cervicalgia, Chronic bilateral low back pain with right-sided sciatica, Encounter for screening mammogram for breast cancer, Special screening for malignant neoplasms, colon, Essential hypertension, Primary insomnia, Depression, unspecified depression type, Gastroesophageal reflux disease without esophagitis, Allergic conjunctivitis, bilateral,  Tobacco dependence syndrome       DULoxetine 60 MG EC capsule    CYMBALTA    30 capsule    TAKE 1 CAPSULE(60 MG) BY MOUTH DAILY    Chronic pain syndrome, Cervicalgia, Chronic bilateral low back pain with right-sided sciatica       fa-pyridoxine-cyancobalamin 2.5-25-2 MG Tabs per tablet     30 each    Take 1 tablet by mouth daily    Chronic pain syndrome, Cervicalgia, Chronic bilateral low back pain with right-sided sciatica, Encounter for screening mammogram for breast cancer, Special screening for malignant neoplasms, colon, Essential hypertension, Primary insomnia, Depression, unspecified depression type, Gastroesophageal reflux disease without esophagitis, Allergic conjunctivitis, bilateral, Tobacco dependence syndrome       gabapentin 300 MG capsule    NEURONTIN    360 capsule    Take 4 capsules (1,200 mg) by mouth 3 times daily    Chronic pain syndrome, Cervicalgia, Chronic bilateral low back pain with right-sided sciatica, Encounter for screening mammogram for breast cancer, Special screening for malignant neoplasms, colon, Essential hypertension, Primary insomnia, Depression, unspecified depression type, Gastroesophageal reflux disease without esophagitis, Allergic conjunctivitis, bilateral, Tobacco dependence syndrome       HYDROcodone-acetaminophen  MG per tablet    NORCO    180 tablet    Take 1 tablet by mouth every 4 hours as needed for moderate to severe pain    Cervicalgia, Chronic bilateral low back pain with right-sided sciatica, Chronic pain syndrome       losartan 100 MG tablet    COZAAR    30 tablet    Take 1 tablet (100 mg) by mouth daily    Essential hypertension, Chronic pain syndrome, Cervicalgia, Chronic bilateral low back pain with right-sided sciatica, Encounter for screening mammogram for breast cancer, Special screening for malignant neoplasms, colon, Primary insomnia, Depression, unspecified depression type, Gastroesophageal reflux disease without esophagitis, Allergic  conjunctivitis, bilateral, Tobacco dependence syndrome       metoprolol tartrate 100 MG tablet    LOPRESSOR    90 tablet    TAKE 1 TABLET(100 MG) BY MOUTH DAILY    Essential hypertension, Chronic pain syndrome, Cervicalgia, Chronic bilateral low back pain with right-sided sciatica, Encounter for screening mammogram for breast cancer, Special screening for malignant neoplasms, colon, Primary insomnia, Depression, unspecified depression type, Gastroesophageal reflux disease without esophagitis, Allergic conjunctivitis, bilateral, Tobacco dependence syndrome       naproxen 500 MG tablet    NAPROSYN    60 tablet    Take 1 tablet (500 mg) by mouth 2 times daily    Chronic pain syndrome, Cervicalgia, Chronic bilateral low back pain with right-sided sciatica, Encounter for screening mammogram for breast cancer, Special screening for malignant neoplasms, colon, Essential hypertension, Primary insomnia, Depression, unspecified depression type, Gastroesophageal reflux disease without esophagitis, Allergic conjunctivitis, bilateral, Tobacco dependence syndrome       nicotine 21 MG/24HR 24 hr patch    NICODERM CQ    30 patch    Place 1 patch onto the skin every 24 hours    Tobacco dependence syndrome, Chronic pain syndrome, Cervicalgia, Chronic bilateral low back pain with right-sided sciatica, Encounter for screening mammogram for breast cancer, Special screening for malignant neoplasms, colon, Essential hypertension, Primary insomnia, Depression, unspecified depression type, Gastroesophageal reflux disease without esophagitis, Allergic conjunctivitis, bilateral       omeprazole 20 MG CR capsule    priLOSEC    30 capsule    Take 1 capsule (20 mg) by mouth daily    Gastroesophageal reflux disease without esophagitis, Chronic pain syndrome, Cervicalgia, Chronic bilateral low back pain with right-sided sciatica, Encounter for screening mammogram for breast cancer, Special screening for malignant neoplasms, colon, Essential  hypertension, Primary insomnia, Depression, unspecified depression type, Allergic conjunctivitis, bilateral, Tobacco dependence syndrome       oxyCODONE 15 MG 12 hr tablet    OxyCONTIN     Take 1 tablet (15 mg) by mouth every 12 hours        spironolactone 25 MG tablet    ALDACTONE    30 tablet    Take 1 tablet (25 mg) by mouth daily    Essential hypertension, Chronic pain syndrome, Cervicalgia, Chronic bilateral low back pain with right-sided sciatica, Encounter for screening mammogram for breast cancer, Special screening for malignant neoplasms, colon, Primary insomnia, Depression, unspecified depression type, Gastroesophageal reflux disease without esophagitis, Allergic conjunctivitis, bilateral, Tobacco dependence syndrome       sulfamethoxazole-trimethoprim 800-160 MG per tablet    BACTRIM DS/SEPTRA DS    14 tablet    Take 1 tablet by mouth 2 times daily    Acute cystitis without hematuria       trimethoprim-polymyxin b ophthalmic solution    POLYTRIM    1 Bottle    Place 1 drop into both eyes 4 times daily    Allergic conjunctivitis, bilateral, Chronic pain syndrome, Cervicalgia, Chronic bilateral low back pain with right-sided sciatica, Encounter for screening mammogram for breast cancer, Special screening for malignant neoplasms, colon, Essential hypertension, Primary insomnia, Depression, unspecified depression type, Gastroesophageal reflux disease without esophagitis, Tobacco dependence syndrome       vitamin D 63300 UNIT capsule    ERGOCALCIFEROL     Take 50,000 Units by mouth        zolpidem 5 MG tablet    AMBIEN    60 tablet    TAKE ONE TABLET BY MOUTH AT BEDTIME    Primary insomnia

## 2018-05-11 NOTE — PROGRESS NOTES
HPI      SUBJECTIVE:   Anuradha Gray is a 53 year old female who presents to clinic today for urinary frequency and dysuria.  She is also concerned because hemoglobin A1c was 5.8 when she was seen at the pain clinic and her vitamin D level is very low    Patient had some labs done at her pain clinic. She was told that she is borderline diabetic (A1C was 5.8). She was also told her vitamin D level was very low so they started her on vitamin D 50,000 units weekly.    URINARY TRACT SYMPTOMS  Onset: about one month    Description:   Painful urination (Dysuria): YES  Blood in urine (Hematuria): no   Delay in urine (Hesitency): no     Intensity: moderate    Progression of Symptoms:  same    Accompanying Signs & Symptoms:  Fever/chills: no   Flank pain no   Nausea and vomiting: no   Any vaginal symptoms: none  Abdominal/Pelvic Pain: no     History:   History of frequent UTI's: no   History of kidney stones: no   Sexually Active: Yes  Possibility of pregnancy: No    Precipitating factors:   none    Therapies Tried and outcome: none       Problem list and histories reviewed & adjusted, as indicated.  Additional history: as documented    Patient Active Problem List   Diagnosis     Chronic pain     Depression, unspecified depression type     Essential hypertension     History reviewed. No pertinent surgical history.    Social History   Substance Use Topics     Smoking status: Current Every Day Smoker     Smokeless tobacco: Never Used     Alcohol use No     History reviewed. No pertinent family history.      Current Outpatient Prescriptions   Medication Sig Dispense Refill     amLODIPine (NORVASC) 10 MG tablet Take 1 tablet (10 mg) by mouth daily 30 tablet 3     atorvastatin (LIPITOR) 80 MG tablet Take 1 tablet (80 mg) by mouth At Bedtime 30 tablet 3     citalopram (CELEXA) 20 MG tablet Take 1 tablet (20 mg) by mouth daily 60 tablet 3     cyanocobalamin (RA VITAMIN B-12 TR) 1000 MCG TBCR Take 1,000 mcg by mouth daily 30  tablet 3     cyclobenzaprine (FLEXERIL) 10 MG tablet Take 1 tablet (10 mg) by mouth daily 42 tablet 3     DULoxetine (CYMBALTA) 60 MG EC capsule TAKE 1 CAPSULE(60 MG) BY MOUTH DAILY 30 capsule 5     fa-pyridoxine-cyancobalamin 2.5-25-2 MG TABS per tablet Take 1 tablet by mouth daily 30 each 3     gabapentin (NEURONTIN) 300 MG capsule Take 4 capsules (1,200 mg) by mouth 3 times daily 360 capsule 3     HYDROcodone-acetaminophen (NORCO)  MG per tablet Take 1 tablet by mouth every 4 hours as needed for moderate to severe pain 180 tablet 0     losartan (COZAAR) 100 MG tablet Take 1 tablet (100 mg) by mouth daily 30 tablet 3     metoprolol tartrate (LOPRESSOR) 100 MG tablet TAKE 1 TABLET(100 MG) BY MOUTH DAILY 90 tablet 1     naproxen (NAPROSYN) 500 MG tablet Take 1 tablet (500 mg) by mouth 2 times daily 60 tablet 3     nicotine (NICODERM CQ) 21 MG/24HR 24 hr patch Place 1 patch onto the skin every 24 hours 30 patch 3     omeprazole (PRILOSEC) 20 MG CR capsule Take 1 capsule (20 mg) by mouth daily 30 capsule 3     oxyCODONE (OXYCONTIN) 15 MG 12 hr tablet Take 1 tablet (15 mg) by mouth every 12 hours  0     spironolactone (ALDACTONE) 25 MG tablet Take 1 tablet (25 mg) by mouth daily 30 tablet 3     sulfamethoxazole-trimethoprim (BACTRIM DS/SEPTRA DS) 800-160 MG per tablet Take 1 tablet by mouth 2 times daily 14 tablet 0     vitamin D (ERGOCALCIFEROL) 00734 UNIT capsule Take 50,000 Units by mouth       zolpidem (AMBIEN) 5 MG tablet TAKE ONE TABLET BY MOUTH AT BEDTIME 60 tablet 0     trimethoprim-polymyxin b (POLYTRIM) ophthalmic solution Place 1 drop into both eyes 4 times daily (Patient not taking: Reported on 5/11/2018) 1 Bottle 3     Allergies   Allergen Reactions     Lisinopril Cough     Labs reviewed in EPIC    Reviewed and updated as needed this visit by clinical staff       Reviewed and updated as needed this visit by Provider         Review of Systems   Constitutional: Negative for diaphoresis, fever,  malaise/fatigue and weight loss.   HENT: Negative for congestion, ear discharge, ear pain, hearing loss, nosebleeds and sore throat.    Eyes: Negative for blurred vision, double vision, photophobia, pain, discharge and redness.   Respiratory: Negative for cough, hemoptysis, sputum production, shortness of breath and wheezing.    Cardiovascular: Negative for chest pain, palpitations, orthopnea and leg swelling.   Gastrointestinal: Negative for abdominal pain, blood in stool, constipation, diarrhea, heartburn, melena, nausea and vomiting.   Genitourinary: Negative.  Negative for dysuria, frequency and urgency.   Musculoskeletal: Positive for back pain and myalgias. Negative for joint pain and neck pain.   Skin: Negative for itching and rash.   Neurological: Negative.  Negative for dizziness, tingling, sensory change, focal weakness, seizures, loss of consciousness, weakness and headaches.   Endo/Heme/Allergies: Negative.    Psychiatric/Behavioral: Negative for depression, hallucinations, substance abuse and suicidal ideas. The patient is not nervous/anxious and does not have insomnia.          Physical Exam   Constitutional: She is oriented to person, place, and time and well-developed, well-nourished, and in no distress. No distress.   HENT:   Head: Normocephalic and atraumatic.   Right Ear: External ear normal.   Left Ear: External ear normal.   Nose: Nose normal.   Eyes: Conjunctivae and EOM are normal. Pupils are equal, round, and reactive to light. Right eye exhibits no discharge. Left eye exhibits no discharge. No scleral icterus.   Neck: Normal range of motion. Neck supple. No JVD present. No tracheal deviation present. No thyromegaly present.   Cardiovascular: Normal rate, regular rhythm, normal heart sounds and intact distal pulses.  Exam reveals no gallop and no friction rub.    No murmur heard.  Pulmonary/Chest: Effort normal and breath sounds normal. No stridor. No respiratory distress. She has no wheezes.  She has no rales. She exhibits no tenderness.   Abdominal: Soft. Bowel sounds are normal. She exhibits no distension and no mass. There is no tenderness. There is no rebound and no guarding.   Musculoskeletal: She exhibits no edema.        Lumbar back: She exhibits decreased range of motion, tenderness, pain and spasm.   Lymphadenopathy:     She has no cervical adenopathy.   Neurological: She is alert and oriented to person, place, and time. She has normal reflexes. No cranial nerve deficit. She exhibits normal muscle tone. Gait normal.   Skin: Skin is warm and dry. No rash noted. She is not diaphoretic. No erythema. No pallor.   Psychiatric: Mood, memory, affect and judgment normal.       (R39.89) Urinary problem  (primary encounter diagnosis)  Comment:   Plan: *UA reflex to Microscopic and Culture ([facility]         and [facility] (except [place] and         [place]), Urine Microscopic, Urine Culture         Aerobic Bacterial            (R82.90) Nonspecific finding on examination of urine  Comment:   Plan: Urine Culture Aerobic Bacterial            (N30.00) Acute cystitis without hematuria  Comment:   Plan: sulfamethoxazole-trimethoprim (BACTRIM         DS/SEPTRA DS) 800-160 MG per tablet         Urinalysis shows bacteria present and there are some clue cells and we talked about the possibility of bacterial vaginosis but she states she is having no vaginal symptoms.  We will start her on Septra while we are waiting for the urine culture.  We also discussed her vitamin D deficiency as well as borderline diabetes

## 2018-05-13 LAB
BACTERIA SPEC CULT: NORMAL
Lab: NORMAL
SPECIMEN SOURCE: NORMAL

## 2018-05-30 ENCOUNTER — RADIANT APPOINTMENT (OUTPATIENT)
Dept: MAMMOGRAPHY | Facility: CLINIC | Age: 53
End: 2018-05-30
Attending: PHYSICIAN ASSISTANT
Payer: COMMERCIAL

## 2018-05-30 DIAGNOSIS — Z12.31 ENCOUNTER FOR SCREENING MAMMOGRAM FOR BREAST CANCER: ICD-10-CM

## 2018-05-30 PROCEDURE — 77067 SCR MAMMO BI INCL CAD: CPT | Mod: TC

## 2018-06-07 DIAGNOSIS — E55.9 VITAMIN D DEFICIENCY: Primary | ICD-10-CM

## 2018-06-28 ENCOUNTER — TELEPHONE (OUTPATIENT)
Dept: FAMILY MEDICINE | Facility: CLINIC | Age: 53
End: 2018-06-28

## 2018-06-28 NOTE — TELEPHONE ENCOUNTER
Patient is calling stating she is going out of town for 3 weeks and the [facility] now requires patient be seen weekly. She is wondering if she can get Keon Mcdonald to fill her two 20 mg Oxycontin (daily) and 6 Hydrocodone 10 mg (daily) prescription for this upcoming month of July.     Please advise.    Ana Humphrey-[place]

## 2018-06-29 NOTE — TELEPHONE ENCOUNTER
Pt went to [facility] 6/28/18. Gave pt a script for a week Hydrocodone and Oxycodone filled today for a week.   [facility] won't fill her meds. They only did injections and medication review. They are going to do a spinal Stimulator.   IGNGER said to go to [facility] for medications at this time.  Sanchez will only give one week at a time.   Pt is wondering if she can be squeezed in Monday for refill pain meds so she can go on Vac with her Children.  Eveline [facility]n [facility]

## 2018-06-29 NOTE — TELEPHONE ENCOUNTER
Per Keon alcaraz-Absolutely not-needs to call [facility] and explain that to them r/t going on vacation and needing additional scripts. She has a pain contract with them. Mary Jones RN

## 2018-07-05 DIAGNOSIS — H10.13 ALLERGIC CONJUNCTIVITIS, BILATERAL: ICD-10-CM

## 2018-07-05 DIAGNOSIS — F32.A DEPRESSION, UNSPECIFIED DEPRESSION TYPE: ICD-10-CM

## 2018-07-05 DIAGNOSIS — F17.200 TOBACCO DEPENDENCE SYNDROME: ICD-10-CM

## 2018-07-05 DIAGNOSIS — F51.01 PRIMARY INSOMNIA: ICD-10-CM

## 2018-07-05 DIAGNOSIS — I10 ESSENTIAL HYPERTENSION: ICD-10-CM

## 2018-07-05 DIAGNOSIS — M54.41 CHRONIC BILATERAL LOW BACK PAIN WITH RIGHT-SIDED SCIATICA: ICD-10-CM

## 2018-07-05 DIAGNOSIS — G89.29 CHRONIC BILATERAL LOW BACK PAIN WITH RIGHT-SIDED SCIATICA: ICD-10-CM

## 2018-07-05 DIAGNOSIS — Z12.11 SPECIAL SCREENING FOR MALIGNANT NEOPLASMS, COLON: ICD-10-CM

## 2018-07-05 DIAGNOSIS — K21.9 GASTROESOPHAGEAL REFLUX DISEASE WITHOUT ESOPHAGITIS: ICD-10-CM

## 2018-07-05 DIAGNOSIS — Z12.31 ENCOUNTER FOR SCREENING MAMMOGRAM FOR BREAST CANCER: ICD-10-CM

## 2018-07-05 DIAGNOSIS — G89.4 CHRONIC PAIN SYNDROME: ICD-10-CM

## 2018-07-05 DIAGNOSIS — M54.2 CERVICALGIA: ICD-10-CM

## 2018-07-05 RX ORDER — SPIRONOLACTONE 25 MG/1
TABLET ORAL
Qty: 30 TABLET | Refills: 0 | Status: SHIPPED | OUTPATIENT
Start: 2018-07-05 | End: 2018-08-06

## 2018-07-05 RX ORDER — LOSARTAN POTASSIUM 100 MG/1
TABLET ORAL
Qty: 30 TABLET | Refills: 0 | Status: SHIPPED | OUTPATIENT
Start: 2018-07-05 | End: 2018-07-31

## 2018-07-05 NOTE — TELEPHONE ENCOUNTER
"Requested Prescriptions   Pending Prescriptions Disp Refills     losartan (COZAAR) 100 MG tablet [Pharmacy Med Name: LOSARTAN 100MG TABLETS] 30 tablet 0     Sig: TAKE 1 TABLET(100 MG) BY MOUTH DAILY    Angiotensin-II Receptors Failed    7/5/2018 10:27 AM       Failed - Normal serum creatinine on file in past 12 months    No lab results found.         Failed - Normal serum potassium on file in past 12 months    No lab results found.                Passed - Blood pressure under 140/90 in past 12 months    BP Readings from Last 3 Encounters:   05/11/18 124/70   04/25/18 109/67 03/28/18 124/80                Passed - Recent (12 mo) or future (30 days) visit within the authorizing provider's specialty    Patient had office visit in the last 12 months or has a visit in the next 30 days with authorizing provider or within the authorizing provider's specialty.  See \"Patient Info\" tab in inbasket, or \"Choose Columns\" in Meds & Orders section of the refill encounter.           Passed - Patient is age 18 or older       Passed - No active pregnancy on record       Passed - No positive pregnancy test in past 12 months        spironolactone (ALDACTONE) 25 MG tablet [Pharmacy Med Name: SPIRONOLACTONE 25MG TABLETS] 30 tablet 0     Sig: TAKE 1 TABLET(25 MG) BY MOUTH DAILY    Diuretics (Including Combos) Protocol Failed    7/5/2018 10:27 AM       Failed - Normal serum creatinine on file in past 12 months    No lab results found.          Failed - Normal serum potassium on file in past 12 months    No lab results found.                Failed - Normal serum sodium on file in past 12 months    No lab results found.          Passed - Blood pressure under 140/90 in past 12 months    BP Readings from Last 3 Encounters:   05/11/18 124/70   04/25/18 109/67 03/28/18 124/80                Passed - Recent (12 mo) or future (30 days) visit within the authorizing provider's specialty    Patient had office visit in the last 12 months or has a " "visit in the next 30 days with authorizing provider or within the authorizing provider's specialty.  See \"Patient Info\" tab in inbasket, or \"Choose Columns\" in Meds & Orders section of the refill encounter.           Passed - Patient is age 18 or older       Passed - No active pregancy on record       Passed - No positive pregnancy test in past 12 months        losartan (COZAAR) 100 MG tablet  Last Written Prescription Date:  03/08/2018  Last Fill Quantity: 30 tablet,  # refills: 3   Last office visit: 5/11/2018 with prescribing provider:  POPPY Mcdonald   Future Office Visit:   Next 5 appointments (look out 90 days)     Sep 26, 2018  2:00 PM CDT   Return Visit with Elsie Nielsen LP   [facility] ([facility]    [address]  [place] [address]   [phone]                 spironolactone (ALDACTONE) 25 MG tablet  Last Written Prescription Date:  03/08/2018  Last Fill Quantity: 30 tablet,  # refills: 3   Last office visit: 5/11/2018 with prescribing provider:  POPPY Mcdonald   Future Office Visit:   Next 5 appointments (look out 90 days)     Sep 26, 2018  2:00 PM CDT   Return Visit with Elsie Nielsen LP   [facility] ([facility])    [address]  [place] [place] [facility] [place] [address]   [phone]                 Yanely David RT (R) (M)    "

## 2018-07-06 NOTE — TELEPHONE ENCOUNTER
Pt notified and understood. Pt is requesting her Pain medication as Pain clinic will not fill at this time.  7/10/18 appt scheduled TOMMY Mosquera [facility]

## 2018-07-16 DIAGNOSIS — G89.29 CHRONIC BILATERAL LOW BACK PAIN WITH RIGHT-SIDED SCIATICA: ICD-10-CM

## 2018-07-16 DIAGNOSIS — K21.9 GASTROESOPHAGEAL REFLUX DISEASE WITHOUT ESOPHAGITIS: ICD-10-CM

## 2018-07-16 DIAGNOSIS — Z12.31 ENCOUNTER FOR SCREENING MAMMOGRAM FOR BREAST CANCER: ICD-10-CM

## 2018-07-16 DIAGNOSIS — F17.200 TOBACCO DEPENDENCE SYNDROME: ICD-10-CM

## 2018-07-16 DIAGNOSIS — G89.4 CHRONIC PAIN SYNDROME: ICD-10-CM

## 2018-07-16 DIAGNOSIS — M54.2 CERVICALGIA: ICD-10-CM

## 2018-07-16 DIAGNOSIS — H10.13 ALLERGIC CONJUNCTIVITIS, BILATERAL: ICD-10-CM

## 2018-07-16 DIAGNOSIS — I10 ESSENTIAL HYPERTENSION: ICD-10-CM

## 2018-07-16 DIAGNOSIS — M54.41 CHRONIC BILATERAL LOW BACK PAIN WITH RIGHT-SIDED SCIATICA: ICD-10-CM

## 2018-07-16 DIAGNOSIS — F51.01 PRIMARY INSOMNIA: ICD-10-CM

## 2018-07-16 DIAGNOSIS — Z12.11 SPECIAL SCREENING FOR MALIGNANT NEOPLASMS, COLON: ICD-10-CM

## 2018-07-16 DIAGNOSIS — F32.A DEPRESSION, UNSPECIFIED DEPRESSION TYPE: ICD-10-CM

## 2018-07-16 RX ORDER — AMLODIPINE BESYLATE 10 MG/1
TABLET ORAL
Qty: 30 TABLET | Refills: 1 | Status: SHIPPED | OUTPATIENT
Start: 2018-07-16 | End: 2018-11-01

## 2018-07-26 ENCOUNTER — OFFICE VISIT (OUTPATIENT)
Dept: FAMILY MEDICINE | Facility: CLINIC | Age: 53
End: 2018-07-26
Payer: COMMERCIAL

## 2018-07-26 VITALS
TEMPERATURE: 97.9 F | OXYGEN SATURATION: 99 % | RESPIRATION RATE: 16 BRPM | DIASTOLIC BLOOD PRESSURE: 74 MMHG | SYSTOLIC BLOOD PRESSURE: 128 MMHG | WEIGHT: 161.4 LBS | HEART RATE: 58 BPM | BODY MASS INDEX: 27.7 KG/M2

## 2018-07-26 DIAGNOSIS — G89.4 CHRONIC PAIN SYNDROME: ICD-10-CM

## 2018-07-26 DIAGNOSIS — Z11.4 SCREENING FOR HIV (HUMAN IMMUNODEFICIENCY VIRUS): ICD-10-CM

## 2018-07-26 DIAGNOSIS — M54.41 CHRONIC BILATERAL LOW BACK PAIN WITH RIGHT-SIDED SCIATICA: ICD-10-CM

## 2018-07-26 DIAGNOSIS — F17.200 TOBACCO DEPENDENCE SYNDROME: ICD-10-CM

## 2018-07-26 DIAGNOSIS — K21.9 GASTROESOPHAGEAL REFLUX DISEASE WITHOUT ESOPHAGITIS: ICD-10-CM

## 2018-07-26 DIAGNOSIS — Z12.11 SPECIAL SCREENING FOR MALIGNANT NEOPLASMS, COLON: ICD-10-CM

## 2018-07-26 DIAGNOSIS — F32.A DEPRESSION, UNSPECIFIED DEPRESSION TYPE: ICD-10-CM

## 2018-07-26 DIAGNOSIS — E78.5 HYPERLIPIDEMIA LDL GOAL <130: Primary | ICD-10-CM

## 2018-07-26 DIAGNOSIS — M54.2 CERVICALGIA: ICD-10-CM

## 2018-07-26 DIAGNOSIS — G89.29 CHRONIC BILATERAL LOW BACK PAIN WITH RIGHT-SIDED SCIATICA: ICD-10-CM

## 2018-07-26 DIAGNOSIS — I10 ESSENTIAL HYPERTENSION: ICD-10-CM

## 2018-07-26 DIAGNOSIS — F51.01 PRIMARY INSOMNIA: ICD-10-CM

## 2018-07-26 DIAGNOSIS — Z12.31 ENCOUNTER FOR SCREENING MAMMOGRAM FOR BREAST CANCER: ICD-10-CM

## 2018-07-26 DIAGNOSIS — Z12.11 SCREEN FOR COLON CANCER: ICD-10-CM

## 2018-07-26 PROCEDURE — 99214 OFFICE O/P EST MOD 30 MIN: CPT | Performed by: NURSE PRACTITIONER

## 2018-07-26 RX ORDER — OXYCODONE HYDROCHLORIDE 15 MG/1
20 TABLET, FILM COATED, EXTENDED RELEASE ORAL EVERY 12 HOURS
Refills: 0 | Status: CANCELLED | OUTPATIENT
Start: 2018-07-26

## 2018-07-26 RX ORDER — HYDROCODONE BITARTRATE AND ACETAMINOPHEN 10; 325 MG/1; MG/1
1 TABLET ORAL EVERY 4 HOURS PRN
Qty: 180 TABLET | Refills: 0 | Status: CANCELLED | OUTPATIENT
Start: 2018-07-26

## 2018-07-26 RX ORDER — GABAPENTIN 300 MG/1
1200 CAPSULE ORAL 3 TIMES DAILY
Qty: 360 CAPSULE | Refills: 3 | Status: SHIPPED | OUTPATIENT
Start: 2018-07-26 | End: 2018-11-01

## 2018-07-26 RX ORDER — ZOLPIDEM TARTRATE 5 MG/1
TABLET ORAL
Qty: 60 TABLET | Refills: 0 | Status: SHIPPED | OUTPATIENT
Start: 2018-07-26 | End: 2018-07-27
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
## 2018-07-26 NOTE — MR AVS SNAPSHOT
After Visit Summary   7/26/2018    Anuradha Gray    MRN: [id number]           Patient Information     Date Of Birth          1965        Visit Information        Provider Department      7/26/2018 9:20 AM Shilpi Macedo APRN [facility]        Today's Diagnoses     Screen for colon cancer        Screening for HIV (human immunodeficiency virus)        Primary insomnia        Gastroesophageal reflux disease without esophagitis        Chronic pain syndrome        Cervicalgia        Chronic bilateral low back pain with right-sided sciatica        Encounter for screening mammogram for breast cancer        Special screening for malignant neoplasms, colon        Essential hypertension        Depression, unspecified depression type        Allergic conjunctivitis, bilateral        Tobacco dependence syndrome          Care Instructions    Will review your pain plan with your primary care provider and submit a prescription for pain management tomorrow.      Opioid Pain Medicines (Narcotics)  What You Need to Know  What are opioids?   Opioids are pain medicines that must be prescribed by a doctor. They are also known as narcotics. Examples are:     morphine (MS Contin, Sakina)    oxycodone (Oxycontin)    oxycodone and acetaminophen (Percocet)    hydrocodone and acetaminophen (Vicodin, Norco)    fentanyl patch (Duragesic)    hydromorphone (Dilaudid)    methadone  What do opioids do well?   Opioids are best for short-term pain after a surgery or injury. They also work well for cancer pain. Unlike other pain medicines, they do not harm the liver or kidney (though people with liver or kidney problems should use caution while taking opioids). They may help some people with long-lasting (chronic) pain.   What do opioids NOT do well?   Opioids never get rid of pain entirely, and they do not work well for most patients with chronic pain. Opioids do not reduce swelling, one of the causes of  pain. They also don't work well for nerve pain.   Prescribed opioids aren't the best way to manage chronic pain.   Other ways to manage pain include:     ibuprofen or acetaminophen. You should always try this first.     acupuncture or massage, deep breathing, meditation, visual imagery, aromatherapy       use heat or ice at the pain site     physical therapy and exercise     stop smoking     see a counselor or therapist   Risks and side effects  Talk to your doctor before you start or decide to keep taking one of these medicines. Risks and side effects include:    Lowering your breathing rate enough to cause death    Overdose, including death, especially if taking higher than prescribed doses    Long-term opioid use    Worse depression symptoms; less pleasure in things you usually enjoy    Feeling tired or sluggish    Slower thoughts or cloudy thinking    Being more sensitive to pain over time; pain is harder to control    Trouble sleeping or restless sleep    Changes in hormone levels (for example, less testosterone)    Changes in sex drive or ability to have sex    Constipation    Unsafe driving    Itching and sweating    Feeling dizzy    Nausea, vomiting and dry mouth  What else should I know about opioids?    When someone takes opioids for too long or too often, they become dependent. This means that if you stop or reduce the medicine too quickly, you will have withdrawal symptoms.    Dependence is not the same as addiction. Addiction is when people keep using a substance despite harm. This includes harm to the body, their mind or their relations with others. If you have a history of drug or alcohol abuse, taking opioids can cause a relapse.    Over time, opioids don't work as well. Most people will need higher and higher doses. The higher the dose, the more serious the side effects. We don't know the long-term effects of opioids.    People who have used opioids for a long time may have a lower quality of life,  worse depression, higher levels of pain and more visits to doctors.    Never share your opioids with others; it is against the law. Be sure to store opioids in a secure place, locked if possible.Young children can easily swallow them and overdose.    You can overdose on opioids. Signs of overdose include decrease or loss of consciousnes, slowed breathing, trouble waking and blue lips. If someone is worried about overdose, they should call 911.     If you are at risk for overdose, you may get naloxone (Narcan), a medicine that reverses the effects of opioids. If you overdose, a friend or family member can give you Narcan while waiting for the ambulance. They need to know the signs of overdose and how to give Narcan.  While you're taking opioids:  Don't use alcohol or street drugs. Taking them together can cause death.  Don't take any of these medicines unless your doctor says its okay. Taking these with opioids can cause death.  Benzodiazepines (such as lorazepam or diazepam).  Muscle relaxers (such as cyclobenzaprine)  sleeping pills  other opioids   Safe disposal of opioids  Find your area drug take-back program, your pharmacy mail-back program, buy a special disposal bag (such as Deterra) from your pharmacy or flush them down the toilet. Use the guidelines at www.fda.gov/drugs/resourcesforyou.  For informational purposes only. Not to replace the advice of your health care provider.   Copyright   2016 [facility]. All rights reserved. QBuy 439583 - Rev 02/18.            Follow-ups after your visit        Your next 10 appointments already scheduled     Sep 19, 2018 12:00 PM CDT   (Arrive by 11:30 AM)   New Visit with Elsie Nielsen LP   [facility] [place] ([facility] [place])    [address]  [place] [address]   [phone]            Sep 26, 2018  2:00 PM CDT   Return Visit with Elsie Nielsen LP   [facility] ([facility] [place])    [address]  [address]  [facility] [address] [phone]              Who to contact     If you have questions or need follow up information about today's clinic visit or your schedule please contact [facility] directly at [phone].  Normal or non-critical lab and imaging results will be communicated to you by MyChart, letter or phone within 4 business days after the clinic has received the results. If you do not hear from us within 7 days, please contact the clinic through MyChart or phone. If you have a critical or abnormal lab result, we will notify you by phone as soon as possible.  Submit refill requests through [employer] or call your pharmacy and they will forward the refill request to us. Please allow 3 business days for your refill to be completed.          Additional Information About Your Visit        Care EveryWhere ID     This is your Care EveryWhere ID. This could be used by other organizations to access your [place] medical records  [id number]        Your Vitals Were     Pulse Temperature Respirations Pulse Oximetry BMI (Body Mass Index)       58 97.9  F (36.6  C) (Tympanic) 16 99% 27.7 kg/m2        Blood Pressure from Last 3 Encounters:   07/26/18 128/74   05/11/18 124/70   04/25/18 109/67    Weight from Last 3 Encounters:   07/26/18 161 lb 6.4 oz (73.2 kg)   05/11/18 162 lb (73.5 kg)   04/25/18 163 lb (73.9 kg)              Today, you had the following     No orders found for display       Primary Care Provider Office Phone # Fax #    Adam Mcdonald PA-C [phone] [address] [place] [address]        Equal Access to Services     ANNABELLE HATCH : Hadalecia Long, jose alberto watkins, qaybta kolealmykel pagan. So [facility] [phone].    ATENCIÓN: Si habla español, tiene a hollingsworth disposición servicios gratuitos de asistencia lingüística. Gael al [phone].    We comply with applicable federal civil rights laws  and [place] laws. We do not discriminate on the basis of race, color, national origin, age, disability, sex, sexual orientation, or gender identity.            Thank you!     Thank you for choosing [facility]  for your care. Our goal is always to provide you with excellent care. Hearing back from our patients is one way we can continue to improve our services. Please take a few minutes to complete the written survey that you may receive in the mail after your visit with us. Thank you!             Your Updated Medication List - Protect others around you: Learn how to safely use, store and throw away your medicines at www.disposemymeds.org.          This list is accurate as of 7/26/18 10:38 AM.  Always use your most recent med list.                   Brand Name Dispense Instructions for use Diagnosis    amLODIPine 10 MG tablet    NORVASC    30 tablet    TAKE 1 TABLET(10 MG) BY MOUTH DAILY    Essential hypertension, Chronic pain syndrome, Cervicalgia, Chronic bilateral low back pain with right-sided sciatica, Encounter for screening mammogram for breast cancer, Special screening for malignant neoplasms, colon, Primary insomnia, Depression, unspecified depression type, Gastroesophageal reflux disease without esophagitis, Allergic conjunctivitis, bilateral, Tobacco dependence syndrome       atorvastatin 80 MG tablet    LIPITOR    30 tablet    Take 1 tablet (80 mg) by mouth At Bedtime    Essential hypertension, Chronic pain syndrome, Cervicalgia, Chronic bilateral low back pain with right-sided sciatica, Encounter for screening mammogram for breast cancer, Special screening for malignant neoplasms, colon, Primary insomnia, Depression, unspecified depression type, Gastroesophageal reflux disease without esophagitis, Allergic conjunctivitis, bilateral, Tobacco dependence syndrome       cholecalciferol 1000 units Tabs    PA VITAMIN D-3    30 tablet    Take 1 tablet by mouth daily    Vitamin D deficiency        citalopram 20 MG tablet    celeXA    60 tablet    Take 1 tablet (20 mg) by mouth daily    Depression, unspecified depression type, Chronic pain syndrome, Cervicalgia, Chronic bilateral low back pain with right-sided sciatica, Encounter for screening mammogram for breast cancer, Special screening for malignant neoplasms, colon, Essential hypertension, Primary insomnia, Gastroesophageal reflux disease without esophagitis, Allergic conjunctivitis, bilateral, Tobacco dependence syndrome       cyanocobalamin 1000 MCG Tbcr    RA VITAMIN B-12 TR    30 tablet    Take 1,000 mcg by mouth daily    Chronic pain syndrome, Cervicalgia, Chronic bilateral low back pain with right-sided sciatica, Encounter for screening mammogram for breast cancer, Special screening for malignant neoplasms, colon, Essential hypertension, Primary insomnia, Depression, unspecified depression type, Gastroesophageal reflux disease without esophagitis, Allergic conjunctivitis, bilateral, Tobacco dependence syndrome       cyclobenzaprine 10 MG tablet    FLEXERIL    42 tablet    Take 1 tablet (10 mg) by mouth daily    Chronic pain syndrome, Cervicalgia, Chronic bilateral low back pain with right-sided sciatica, Encounter for screening mammogram for breast cancer, Special screening for malignant neoplasms, colon, Essential hypertension, Primary insomnia, Depression, unspecified depression type, Gastroesophageal reflux disease without esophagitis, Allergic conjunctivitis, bilateral, Tobacco dependence syndrome       DULoxetine 60 MG EC capsule    CYMBALTA    30 capsule    TAKE 1 CAPSULE(60 MG) BY MOUTH DAILY    Chronic pain syndrome, Cervicalgia, Chronic bilateral low back pain with right-sided sciatica       fa-pyridoxine-cyancobalamin 2.5-25-2 MG Tabs per tablet     30 each    Take 1 tablet by mouth daily    Chronic pain syndrome, Cervicalgia, Chronic bilateral low back pain with right-sided sciatica, Encounter for screening mammogram for breast  cancer, Special screening for malignant neoplasms, colon, Essential hypertension, Primary insomnia, Depression, unspecified depression type, Gastroesophageal reflux disease without esophagitis, Allergic conjunctivitis, bilateral, Tobacco dependence syndrome       gabapentin 300 MG capsule    NEURONTIN    360 capsule    Take 4 capsules (1,200 mg) by mouth 3 times daily    Chronic pain syndrome, Cervicalgia, Chronic bilateral low back pain with right-sided sciatica, Encounter for screening mammogram for breast cancer, Special screening for malignant neoplasms, colon, Essential hypertension, Primary insomnia, Depression, unspecified depression type, Gastroesophageal reflux disease without esophagitis, Allergic conjunctivitis, bilateral, Tobacco dependence syndrome       HYDROcodone-acetaminophen  MG per tablet    NORCO    180 tablet    Take 1 tablet by mouth every 4 hours as needed for moderate to severe pain    Cervicalgia, Chronic bilateral low back pain with right-sided sciatica, Chronic pain syndrome       losartan 100 MG tablet    COZAAR    30 tablet    TAKE 1 TABLET(100 MG) BY MOUTH DAILY    Essential hypertension, Chronic pain syndrome, Cervicalgia, Chronic bilateral low back pain with right-sided sciatica, Encounter for screening mammogram for breast cancer, Special screening for malignant neoplasms, colon, Primary insomnia, Depression, unspecified depression type, Gastroesophageal reflux disease without esophagitis, Allergic conjunctivitis, bilateral, Tobacco dependence syndrome       metoprolol tartrate 100 MG tablet    LOPRESSOR    90 tablet    TAKE 1 TABLET(100 MG) BY MOUTH DAILY    Essential hypertension, Chronic pain syndrome, Cervicalgia, Chronic bilateral low back pain with right-sided sciatica, Encounter for screening mammogram for breast cancer, Special screening for malignant neoplasms, colon, Primary insomnia, Depression, unspecified depression type, Gastroesophageal reflux disease without  esophagitis, Allergic conjunctivitis, bilateral, Tobacco dependence syndrome       naproxen 500 MG tablet    NAPROSYN    60 tablet    Take 1 tablet (500 mg) by mouth 2 times daily    Chronic pain syndrome, Cervicalgia, Chronic bilateral low back pain with right-sided sciatica, Encounter for screening mammogram for breast cancer, Special screening for malignant neoplasms, colon, Essential hypertension, Primary insomnia, Depression, unspecified depression type, Gastroesophageal reflux disease without esophagitis, Allergic conjunctivitis, bilateral, Tobacco dependence syndrome       nicotine 21 MG/24HR 24 hr patch    NICODERM CQ    30 patch    Place 1 patch onto the skin every 24 hours    Tobacco dependence syndrome, Chronic pain syndrome, Cervicalgia, Chronic bilateral low back pain with right-sided sciatica, Encounter for screening mammogram for breast cancer, Special screening for malignant neoplasms, colon, Essential hypertension, Primary insomnia, Depression, unspecified depression type, Gastroesophageal reflux disease without esophagitis, Allergic conjunctivitis, bilateral       omeprazole 20 MG CR capsule    priLOSEC    30 capsule    Take 1 capsule (20 mg) by mouth daily    Gastroesophageal reflux disease without esophagitis, Chronic pain syndrome, Cervicalgia, Chronic bilateral low back pain with right-sided sciatica, Encounter for screening mammogram for breast cancer, Special screening for malignant neoplasms, colon, Essential hypertension, Primary insomnia, Depression, unspecified depression type, Allergic conjunctivitis, bilateral, Tobacco dependence syndrome       oxyCODONE 15 MG 12 hr tablet    OxyCONTIN     Take 20 mg by mouth every 12 hours        spironolactone 25 MG tablet    ALDACTONE    30 tablet    TAKE 1 TABLET(25 MG) BY MOUTH DAILY    Essential hypertension, Chronic pain syndrome, Cervicalgia, Chronic bilateral low back pain with right-sided sciatica, Encounter for screening mammogram for breast  cancer, Special screening for malignant neoplasms, colon, Primary insomnia, Depression, unspecified depression type, Gastroesophageal reflux disease without esophagitis, Allergic conjunctivitis, bilateral, Tobacco dependence syndrome       vitamin D 53984 UNIT capsule    ERGOCALCIFEROL     Take 50,000 Units by mouth        zolpidem 5 MG tablet    AMBIEN    60 tablet    TAKE ONE TABLET BY MOUTH AT BEDTIME    Primary insomnia

## 2018-07-26 NOTE — PATIENT INSTRUCTIONS
Will review your pain plan with your primary care provider and submit a prescription for pain management tomorrow.      Opioid Pain Medicines (Narcotics)  What You Need to Know  What are opioids?   Opioids are pain medicines that must be prescribed by a doctor. They are also known as narcotics. Examples are:     morphine (MS Contin, Sakina)    oxycodone (Oxycontin)    oxycodone and acetaminophen (Percocet)    hydrocodone and acetaminophen (Vicodin, Norco)    fentanyl patch (Duragesic)    hydromorphone (Dilaudid)    methadone  What do opioids do well?   Opioids are best for short-term pain after a surgery or injury. They also work well for cancer pain. Unlike other pain medicines, they do not harm the liver or kidney (though people with liver or kidney problems should use caution while taking opioids). They may help some people with long-lasting (chronic) pain.   What do opioids NOT do well?   Opioids never get rid of pain entirely, and they do not work well for most patients with chronic pain. Opioids do not reduce swelling, one of the causes of pain. They also don't work well for nerve pain.   Prescribed opioids aren't the best way to manage chronic pain.   Other ways to manage pain include:     ibuprofen or acetaminophen. You should always try this first.     acupuncture or massage, deep breathing, meditation, visual imagery, aromatherapy       use heat or ice at the pain site     physical therapy and exercise     stop smoking     see a counselor or therapist   Risks and side effects  Talk to your doctor before you start or decide to keep taking one of these medicines. Risks and side effects include:    Lowering your breathing rate enough to cause death    Overdose, including death, especially if taking higher than prescribed doses    Long-term opioid use    Worse depression symptoms; less pleasure in things you usually enjoy    Feeling tired or sluggish    Slower thoughts or cloudy thinking    Being more  sensitive to pain over time; pain is harder to control    Trouble sleeping or restless sleep    Changes in hormone levels (for example, less testosterone)    Changes in sex drive or ability to have sex    Constipation    Unsafe driving    Itching and sweating    Feeling dizzy    Nausea, vomiting and dry mouth  What else should I know about opioids?    When someone takes opioids for too long or too often, they become dependent. This means that if you stop or reduce the medicine too quickly, you will have withdrawal symptoms.    Dependence is not the same as addiction. Addiction is when people keep using a substance despite harm. This includes harm to the body, their mind or their relations with others. If you have a history of drug or alcohol abuse, taking opioids can cause a relapse.    Over time, opioids don't work as well. Most people will need higher and higher doses. The higher the dose, the more serious the side effects. We don't know the long-term effects of opioids.    People who have used opioids for a long time may have a lower quality of life, worse depression, higher levels of pain and more visits to doctors.    Never share your opioids with others; it is against the law. Be sure to store opioids in a secure place, locked if possible.Young children can easily swallow them and overdose.    You can overdose on opioids. Signs of overdose include decrease or loss of consciousnes, slowed breathing, trouble waking and blue lips. If someone is worried about overdose, they should call 911.     If you are at risk for overdose, you may get naloxone (Narcan), a medicine that reverses the effects of opioids. If you overdose, a friend or family member can give you Narcan while waiting for the ambulance. They need to know the signs of overdose and how to give Narcan.  While you're taking opioids:  Don't use alcohol or street drugs. Taking them together can cause death.  Don't take any of these medicines unless your  doctor says its okay. Taking these with opioids can cause death.  Benzodiazepines (such as lorazepam or diazepam).  Muscle relaxers (such as cyclobenzaprine)  sleeping pills  other opioids   Safe disposal of opioids  Find your area drug take-back program, your pharmacy mail-back program, buy a special disposal bag (such as Deterra) from your pharmacy or flush them down the toilet. Use the guidelines at www.fda.gov/drugs/resourcesforyou.  For informational purposes only. Not to replace the advice of your health care provider.   Copyright   2016 [place] [facility]. All rights reserved. [employer] 298974 - Rev 02/18.

## 2018-07-27 ENCOUNTER — TELEPHONE (OUTPATIENT)
Dept: PALLIATIVE MEDICINE | Facility: CLINIC | Age: 53
End: 2018-07-27

## 2018-07-27 ENCOUNTER — TELEPHONE (OUTPATIENT)
Dept: FAMILY MEDICINE | Facility: CLINIC | Age: 53
End: 2018-07-27

## 2018-07-27 RX ORDER — ZOLPIDEM TARTRATE 5 MG/1
TABLET ORAL
Qty: 60 TABLET | Refills: 0 | Status: SHIPPED | OUTPATIENT
Start: 2018-07-27 | End: 2018-10-18

## 2018-07-27 NOTE — TELEPHONE ENCOUNTER
Reviewed pain medication with her primary care provider Ke[facility] patient is to receive pain medications only from a pain clinic patient is to follow back up with her pain clinic if she needs further pain medications and I also did send in a referral for new pain clinic if she is having issues with her current pain clinic at this point due to the complexity of her pain medications unable to prescribe her pain medications as she has been getting them from the pain clinic all along and we have not been involved in prescribing her pain medications.    Shilpi Macedo CNP

## 2018-07-27 NOTE — TELEPHONE ENCOUNTER
Pt was into see INDU Macedo - for Pain all over. Shilpi Hellen was going to talk to TOMMY Mcdonald about her pain medication. Pt wanting to go out of town unable to until she hears about her Medication. Please Advise.  [phone]  [facility]

## 2018-07-27 NOTE — TELEPHONE ENCOUNTER
[facility] Management Center Referral      1. Confirmed address with patient? Yes  2. Confirmed phone number with patient? Yes  3. Confirmed referring provider? Yes  4. Is the PCP the same as the referring provider? No  5. Has the patient been to any previous pain clinics? Yes- [facility]  (If yes, send [facility] with welcome letter)  6. Which insurance are we to bill for this appointment?  [employer]    7. Informed pt of cancellation (48 hour) policy? Yes    REGARDING OPIOID MEDICATIONS: We will always address appropriateness of opioid pain medications, but we generally will not automatically take on a prescribing role. When we do take on prescribing of opioids for chronic pain, it is in collaboration with the referring physician for an intermediate period of time (months), with an expectation that the primary physician or provider will assume the prescribing role if medications are effective at stable doses with demonstrated compliance. Therefore, please do not assume that your prescribing responsibilities end on the day of pain clinic consultation.  7. Informed pt of prescribing policy? Yes      8. Referring Provider: Dr. Macedo    9. Criteria for Triage Eval:   -Missed/Failed 1st [facility] appointment? N/A   -Medication Focused? N/A   -Mental Health Concerns? (e.g. Recent psych hospitalization/snap shot)? N/A   -Active substance abuse? N/A   -Patient behaviors (e.g. Offensive language/raised voice)? N/A      Patricia RAMSAY    [facility] [place]

## 2018-07-31 DIAGNOSIS — F51.01 PRIMARY INSOMNIA: ICD-10-CM

## 2018-07-31 DIAGNOSIS — Z12.11 SPECIAL SCREENING FOR MALIGNANT NEOPLASMS, COLON: ICD-10-CM

## 2018-07-31 DIAGNOSIS — G89.4 CHRONIC PAIN SYNDROME: ICD-10-CM

## 2018-07-31 DIAGNOSIS — I10 ESSENTIAL HYPERTENSION: ICD-10-CM

## 2018-07-31 DIAGNOSIS — F32.A DEPRESSION, UNSPECIFIED DEPRESSION TYPE: ICD-10-CM

## 2018-07-31 DIAGNOSIS — M54.41 CHRONIC BILATERAL LOW BACK PAIN WITH RIGHT-SIDED SCIATICA: ICD-10-CM

## 2018-07-31 DIAGNOSIS — K21.9 GASTROESOPHAGEAL REFLUX DISEASE WITHOUT ESOPHAGITIS: ICD-10-CM

## 2018-07-31 DIAGNOSIS — M54.2 CERVICALGIA: ICD-10-CM

## 2018-07-31 DIAGNOSIS — G89.29 CHRONIC BILATERAL LOW BACK PAIN WITH RIGHT-SIDED SCIATICA: ICD-10-CM

## 2018-07-31 DIAGNOSIS — Z12.31 ENCOUNTER FOR SCREENING MAMMOGRAM FOR BREAST CANCER: ICD-10-CM

## 2018-07-31 DIAGNOSIS — H10.13 ALLERGIC CONJUNCTIVITIS, BILATERAL: ICD-10-CM

## 2018-07-31 DIAGNOSIS — F17.200 TOBACCO DEPENDENCE SYNDROME: ICD-10-CM

## 2018-07-31 RX ORDER — LOSARTAN POTASSIUM 100 MG/1
TABLET ORAL
Qty: 90 TABLET | Refills: 0 | Status: SHIPPED | OUTPATIENT
Start: 2018-07-31 | End: 2018-11-01

## 2018-07-31 NOTE — TELEPHONE ENCOUNTER
"Requested Prescriptions   Pending Prescriptions Disp Refills     losartan (COZAAR) 100 MG tablet [Pharmacy Med Name: LOSARTAN 100MG TABLETS] 30 tablet 0     Sig: TAKE 1 TABLET(100 MG) BY MOUTH DAILY    Angiotensin-II Receptors Failed    7/31/2018 12:45 PM       Failed - Normal serum creatinine on file in past 12 months    No lab results found.         Failed - Normal serum potassium on file in past 12 months    No lab results found.                Passed - Blood pressure under 140/90 in past 12 months    BP Readings from Last 3 Encounters:   07/26/18 128/74   05/11/18 124/70   04/25/18 109/67                Passed - Recent (12 mo) or future (30 days) visit within the authorizing provider's specialty    Patient had office visit in the last 12 months or has a visit in the next 30 days with authorizing provider or within the authorizing provider's specialty.  See \"Patient Info\" tab in inbasket, or \"Choose Columns\" in Meds & Orders section of the refill encounter.           Passed - Patient is age 18 or older       Passed - No active pregnancy on record       Passed - No positive pregnancy test in past 12 months        Last Written Prescription Date:  7/5/18  Last Fill Quantity: 30,  # refills: 0   Last office visit: 7/26/2018 with prescribing provider:     Future Office Visit:   Next 5 appointments (look out 90 days)     Sep 26, 2018  2:00 PM CDT   Return Visit with Elsie Nielsen LP   [facility] ([facility])    [address]  [address]  [facility] [address] [phone]                   "

## 2018-08-06 DIAGNOSIS — Z12.31 ENCOUNTER FOR SCREENING MAMMOGRAM FOR BREAST CANCER: ICD-10-CM

## 2018-08-06 DIAGNOSIS — Z12.11 SPECIAL SCREENING FOR MALIGNANT NEOPLASMS, COLON: ICD-10-CM

## 2018-08-06 DIAGNOSIS — F51.01 PRIMARY INSOMNIA: ICD-10-CM

## 2018-08-06 DIAGNOSIS — F32.A DEPRESSION, UNSPECIFIED DEPRESSION TYPE: ICD-10-CM

## 2018-08-06 DIAGNOSIS — K21.9 GASTROESOPHAGEAL REFLUX DISEASE WITHOUT ESOPHAGITIS: ICD-10-CM

## 2018-08-06 DIAGNOSIS — M54.41 CHRONIC BILATERAL LOW BACK PAIN WITH RIGHT-SIDED SCIATICA: ICD-10-CM

## 2018-08-06 DIAGNOSIS — M54.2 CERVICALGIA: ICD-10-CM

## 2018-08-06 DIAGNOSIS — G89.4 CHRONIC PAIN SYNDROME: ICD-10-CM

## 2018-08-06 DIAGNOSIS — I10 ESSENTIAL HYPERTENSION: ICD-10-CM

## 2018-08-06 DIAGNOSIS — H10.13 ALLERGIC CONJUNCTIVITIS, BILATERAL: ICD-10-CM

## 2018-08-06 DIAGNOSIS — E55.9 VITAMIN D DEFICIENCY: ICD-10-CM

## 2018-08-06 DIAGNOSIS — G89.29 CHRONIC BILATERAL LOW BACK PAIN WITH RIGHT-SIDED SCIATICA: ICD-10-CM

## 2018-08-06 DIAGNOSIS — F17.200 TOBACCO DEPENDENCE SYNDROME: ICD-10-CM

## 2018-08-06 RX ORDER — CHOLECALCIFEROL (VITAMIN D3) 25 MCG
TABLET ORAL
Qty: 30 TABLET | Refills: 0 | Status: SHIPPED | OUTPATIENT
Start: 2018-08-06 | End: 2018-09-01

## 2018-08-06 RX ORDER — SPIRONOLACTONE 25 MG/1
TABLET ORAL
Qty: 30 TABLET | Refills: 0 | Status: SHIPPED | OUTPATIENT
Start: 2018-08-06 | End: 2018-09-08

## 2018-08-21 ENCOUNTER — TELEPHONE (OUTPATIENT)
Dept: FAMILY MEDICINE | Facility: CLINIC | Age: 53
End: 2018-08-21

## 2018-08-21 NOTE — TELEPHONE ENCOUNTER
Pt is requesting a early refill on her Gabapentin. Pt is leaving out of town today.  Pharmacy is unable to do a 3 day early refill. Please call to ok or nash Krishna- [phone]  [facility]

## 2018-09-01 DIAGNOSIS — E55.9 VITAMIN D DEFICIENCY: ICD-10-CM

## 2018-09-01 DIAGNOSIS — I10 ESSENTIAL HYPERTENSION: ICD-10-CM

## 2018-09-01 DIAGNOSIS — F32.A DEPRESSION, UNSPECIFIED DEPRESSION TYPE: ICD-10-CM

## 2018-09-01 DIAGNOSIS — M54.41 CHRONIC BILATERAL LOW BACK PAIN WITH RIGHT-SIDED SCIATICA: ICD-10-CM

## 2018-09-01 DIAGNOSIS — F51.01 PRIMARY INSOMNIA: ICD-10-CM

## 2018-09-01 DIAGNOSIS — M54.2 CERVICALGIA: ICD-10-CM

## 2018-09-01 DIAGNOSIS — Z12.31 ENCOUNTER FOR SCREENING MAMMOGRAM FOR BREAST CANCER: ICD-10-CM

## 2018-09-01 DIAGNOSIS — G89.4 CHRONIC PAIN SYNDROME: ICD-10-CM

## 2018-09-01 DIAGNOSIS — G89.29 CHRONIC BILATERAL LOW BACK PAIN WITH RIGHT-SIDED SCIATICA: ICD-10-CM

## 2018-09-01 DIAGNOSIS — F17.200 TOBACCO DEPENDENCE SYNDROME: ICD-10-CM

## 2018-09-01 DIAGNOSIS — K21.9 GASTROESOPHAGEAL REFLUX DISEASE WITHOUT ESOPHAGITIS: ICD-10-CM

## 2018-09-01 DIAGNOSIS — H10.13 ALLERGIC CONJUNCTIVITIS, BILATERAL: ICD-10-CM

## 2018-09-01 DIAGNOSIS — Z12.11 SPECIAL SCREENING FOR MALIGNANT NEOPLASMS, COLON: ICD-10-CM

## 2018-09-04 RX ORDER — NAPROXEN 500 MG/1
TABLET ORAL
Qty: 60 TABLET | Refills: 3 | Status: SHIPPED | OUTPATIENT
Start: 2018-09-04 | End: 2019-01-28

## 2018-09-04 RX ORDER — CHOLECALCIFEROL (VITAMIN D3) 25 MCG
TABLET ORAL
Qty: 30 TABLET | Refills: 3 | Status: SHIPPED | OUTPATIENT
Start: 2018-09-04

## 2018-09-04 NOTE — TELEPHONE ENCOUNTER
"Requested Prescriptions   Pending Prescriptions Disp Refills     VITAMIN D3 1000 units tablet [Pharmacy Med Name: VITAMIN D 1,000UNIT TABLETS] 30 tablet 0     Sig: TAKE 1 TABLET BY MOUTH DAILY    Vitamin Supplements (Adult) Protocol Passed    9/1/2018  3:36 PM       Passed - High dose Vitamin D not ordered       Passed - Recent (12 mo) or future (30 days) visit within the authorizing provider's specialty    Patient had office visit in the last 12 months or has a visit in the next 30 days with authorizing provider or within the authorizing provider's specialty.  See \"Patient Info\" tab in inbasket, or \"Choose Columns\" in Meds & Orders section of the refill encounter.        Last Written Prescription Date:  8/6/18  Last Fill Quantity: 30,  # refills: 0   Last office visit: 7/26/2018 with prescribing provider:     Future Office Visit:   Next 5 appointments (look out 90 days)     Sep 26, 2018  2:00 PM CDT   Return Visit with Elsie Nielsen LP   [facility] ([facility])    [address] [place] [address] [address]  [facility] [address]   [phone]                           naproxen (NAPROSYN) 500 MG tablet [Pharmacy Med Name: NAPROXEN 500MG TABLETS] 60 tablet 0     Sig: TAKE 1 TABLET(500 MG) BY MOUTH TWICE DAILY    NSAID Medications Failed    9/1/2018  3:36 PM       Failed - Normal ALT on file in past 12 months    No lab results found.         Failed - Normal AST on file in past 12 months    No lab results found.         Failed - Normal CBC on file in past 12 months    No lab results found.    For [facility] ONLY: CXMX283 = WBC, HYDX841 = RBC         Failed - Normal serum creatinine on file in past 12 months    No lab results found.         Passed - Blood pressure under 140/90 in past 12 months    BP Readings from Last 3 Encounters:   07/26/18 128/74   05/11/18 124/70   04/25/18 109/67                Passed - Recent (12 mo) or future (30 days) visit within the authorizing provider's specialty    Patient had office " "visit in the last 12 months or has a visit in the next 30 days with authorizing provider or within the authorizing provider's specialty.  See \"Patient Info\" tab in inbasket, or \"Choose Columns\" in Meds & Orders section of the refill encounter.      Last Written Prescription Date:  3/8/18  Last Fill Quantity: 60,  # refills: 3   Last office visit: 7/26/2018 with prescribing provider:     Future Office Visit:   Next 5 appointments (look out 90 days)     Sep 26, 2018  2:00 PM CDT   Return Visit with Elsie Nielsen LP   [facility] ([facility] [place])    [address]  [address]  [facility] [id number] [phone]                          Passed - Patient is age 6-64 years       Passed - No active pregnancy on record       Passed - No positive pregnancy test in past 12 months          "

## 2018-09-08 DIAGNOSIS — I10 ESSENTIAL HYPERTENSION: ICD-10-CM

## 2018-09-08 NOTE — LETTER
[facility]  [address] [place] [address]  [phone]        October 2, 2018    Anuradha Gray  [address] [place] [address]              Dear Anuradha Gray    This is to remind you that your provider wanted you to return to the clinic for fasting lab test(s),    please fast for 10-12 hours. Morning medications can be taken with water.    You may call our office at [facility] at [phone] to schedule an appointment.    Please disregard this notice if you have already had your labs drawn or made an appointment.          Sincerely,        Shilpi Macedo, CNP

## 2018-09-09 NOTE — TELEPHONE ENCOUNTER
"Requested Prescriptions   Pending Prescriptions Disp Refills     spironolactone (ALDACTONE) 25 MG tablet   Last Written Prescription Date:  8/6/18  Last Fill Quantity: 30,  # refills: 0   Last office visit: 5/11/2018 with prescribing provider:  JOANNE Mcdonald   Future Office Visit:   Next 5 appointments (look out 90 days)     Sep 26, 2018  2:00 PM CDT   Return Visit with Elsie Nielsen LP   [facility] ([facility])    [address]  [address]  [facility] [address]   [phone]                  30 tablet 0     Sig: TAKE 1 TABLET BY MOUTH ONCE DAILY    Diuretics (Including Combos) Protocol Failed    9/8/2018 11:20 AM       Failed - Normal serum creatinine on file in past 12 months    No lab results found.          Failed - Normal serum potassium on file in past 12 months    No lab results found.                Failed - Normal serum sodium on file in past 12 months    No lab results found.          Passed - Blood pressure under 140/90 in past 12 months    BP Readings from Last 3 Encounters:   07/26/18 128/74   05/11/18 124/70   04/25/18 109/67                Passed - Recent (12 mo) or future (30 days) visit within the authorizing provider's specialty    Patient had office visit in the last 12 months or has a visit in the next 30 days with authorizing provider or within the authorizing provider's specialty.  See \"Patient Info\" tab in inbasket, or \"Choose Columns\" in Meds & Orders section of the refill encounter.           Passed - Patient is age 18 or older       Passed - No active pregancy on record       Passed - No positive pregnancy test in past 12 months          "

## 2018-09-10 RX ORDER — SPIRONOLACTONE 25 MG/1
TABLET ORAL
Qty: 30 TABLET | Refills: 0 | Status: SHIPPED | OUTPATIENT
Start: 2018-09-10 | End: 2018-10-15

## 2018-09-17 ENCOUNTER — TELEPHONE (OUTPATIENT)
Dept: FAMILY MEDICINE | Facility: CLINIC | Age: 53
End: 2018-09-17

## 2018-09-17 NOTE — TELEPHONE ENCOUNTER
9/17/2018    Call Regarding Preventive Health Screening Colonoscopy and Cervical/PAP       Attempt 1    Message PATIENT STATES SHE COULDN'T HEAR ME AND HUNG UP    Comments:       Outreach   SV

## 2018-09-19 ENCOUNTER — OFFICE VISIT (OUTPATIENT)
Dept: PSYCHOLOGY | Facility: CLINIC | Age: 53
End: 2018-09-19
Attending: PHYSICIAN ASSISTANT
Payer: COMMERCIAL

## 2018-09-19 DIAGNOSIS — F41.1 GENERALIZED ANXIETY DISORDER: ICD-10-CM

## 2018-09-19 DIAGNOSIS — F33.1 MAJOR DEPRESSIVE DISORDER, RECURRENT, MODERATE (H): Primary | ICD-10-CM

## 2018-09-19 PROCEDURE — 90834 PSYTX W PT 45 MINUTES: CPT | Performed by: PSYCHOLOGIST

## 2018-09-19 ASSESSMENT — ANXIETY QUESTIONNAIRES
2. NOT BEING ABLE TO STOP OR CONTROL WORRYING: MORE THAN HALF THE DAYS
GAD7 TOTAL SCORE: 10
1. FEELING NERVOUS, ANXIOUS, OR ON EDGE: MORE THAN HALF THE DAYS
6. BECOMING EASILY ANNOYED OR IRRITABLE: MORE THAN HALF THE DAYS
5. BEING SO RESTLESS THAT IT IS HARD TO SIT STILL: SEVERAL DAYS
3. WORRYING TOO MUCH ABOUT DIFFERENT THINGS: SEVERAL DAYS
7. FEELING AFRAID AS IF SOMETHING AWFUL MIGHT HAPPEN: SEVERAL DAYS

## 2018-09-19 ASSESSMENT — PATIENT HEALTH QUESTIONNAIRE - PHQ9: 5. POOR APPETITE OR OVEREATING: SEVERAL DAYS
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
## 2018-09-19 NOTE — MR AVS SNAPSHOT
"                  MRN:[id number]                      After Visit Summary   2018    Anuradha Gray    MRN: [id number]           Visit Information        Provider Department      2018 12:00 PM Elsie Nielsen LP [facility] [place] [facility] GENERAL PSYCH      Your next 10 appointments already scheduled     Sep 26, 2018 12:00 PM CDT   Return Visit with Elsie Nielsen LP   [facility] [place] ([facility] [place])    [address]  [place] [address]   [phone]            Sep 26, 2018  2:00 PM CDT   Return Visit with Elsie Nielsen LP   [facility] [place] ([facility] [place])    [address]  [place] [address]   [phone]            Oct 31, 2018  2:30 PM CDT   New Visit with Robbin Rey MD   [facility] ([facility])    [address] [place] [address]  [address]  [facility] [id number] [phone]              MyChart Information     [employer] lets you send messages to your doctor, view your test results, renew your prescriptions, schedule appointments and more. To sign up, go to [email or web address] . Click on \"Log in\" on the left side of the screen, which will take you to the Welcome page. Then click on \"Sign up Now\" on the right side of the page.     You will be asked to enter the access code listed below, as well as some personal information. Please follow the directions to create your username and password.     Your access code is: [email or web address]  Expires: 2018  6:19 PM     Your access code will  in 90 days. If you need help or a new code, please call your [place] clinic or [phone].        Care EveryWhere ID     This is your Care EveryWhere ID. This could be used by other organizations to access your [place] medical records  [id number]        Equal Access to Services     ANNABELLE HATCH AH: Kirill Long, jose alberto watkins, qajaylin craft, " mykel shukla'aan ah. So [facility] [phone].    ATENCIÓN: Si habla español, tiene a hollingsworth disposición servicios gratuitos de asistencia lingüística. Llame al [phone].    We comply with applicable federal civil rights laws and [place] laws. We do not discriminate on the basis of race, color, national origin, age, disability, sex, sexual orientation, or gender identity.

## 2018-09-20 ASSESSMENT — ANXIETY QUESTIONNAIRES: GAD7 TOTAL SCORE: 10

## 2018-09-20 ASSESSMENT — PATIENT HEALTH QUESTIONNAIRE - PHQ9: SUM OF ALL RESPONSES TO PHQ QUESTIONS 1-9: 15

## 2018-10-15 ENCOUNTER — TELEPHONE (OUTPATIENT)
Dept: FAMILY MEDICINE | Facility: CLINIC | Age: 53
End: 2018-10-15

## 2018-10-15 DIAGNOSIS — I10 ESSENTIAL HYPERTENSION: ICD-10-CM

## 2018-10-15 RX ORDER — SPIRONOLACTONE 25 MG/1
25 TABLET ORAL DAILY
Qty: 30 TABLET | Refills: 0 | Status: SHIPPED | OUTPATIENT
Start: 2018-10-15 | End: 2018-11-27

## 2018-10-15 NOTE — TELEPHONE ENCOUNTER
Reviewing charts. Patient is due for a physical with pap smear.    Called and scheduled patient's physical for 10/29/18.    Doris Zhou MA

## 2018-10-15 NOTE — TELEPHONE ENCOUNTER
"Requested Prescriptions   Pending Prescriptions Disp Refills     spironolactone (ALDACTONE) 25 MG tablet 30 tablet 0     Sig: Take 1 tablet (25 mg) by mouth daily    Diuretics (Including Combos) Protocol Failed    10/15/2018  8:46 AM       Failed - Normal serum creatinine on file in past 12 months    No lab results found.          Failed - Normal serum potassium on file in past 12 months    No lab results found.                Failed - Normal serum sodium on file in past 12 months    No lab results found.          Passed - Blood pressure under 140/90 in past 12 months    BP Readings from Last 3 Encounters:   07/26/18 128/74   05/11/18 124/70   04/25/18 109/67                Passed - Recent (12 mo) or future (30 days) visit within the authorizing provider's specialty    Patient had office visit in the last 12 months or has a visit in the next 30 days with authorizing provider or within the authorizing provider's specialty.  See \"Patient Info\" tab in inbasket, or \"Choose Columns\" in Meds & Orders section of the refill encounter.           Passed - Patient is age 18 or older       Passed - No active pregancy on record       Passed - No positive pregnancy test in past 12 months        Last Written Prescription Date:  9/10/18  Last Fill Quantity: 30,  # refills: 0   Last office visit: 7/26/2018 with prescribing provider:  Hellen   Future Office Visit:   Next 5 appointments (look out 90 days)     Oct 24, 2018  2:00 PM CDT   Return Visit with Elsie Nielsen LP   [facility] ([facility])    [address]  [facility] [address] [phone]            Oct 24, 2018  4:00 PM CDT   Return Visit with Elsie Nielsen LP   [facility] ([facility])    [address]  [facility] [address] [phone]                   "

## 2018-10-18 DIAGNOSIS — F51.01 PRIMARY INSOMNIA: ICD-10-CM

## 2018-10-18 NOTE — TELEPHONE ENCOUNTER
Requested Prescriptions   Pending Prescriptions Disp Refills     zolpidem (AMBIEN) 5 MG tablet 60 tablet 0     Sig: TAKE ONE TABLET BY MOUTH AT BEDTIME    There is no refill protocol information for this order        Last Written Prescription Date:  07/27/2018  Last Fill Quantity: 60,  # refills: 0   Last office visit: 7/26/2018 with prescribing provider:  Hellen Valdes Office Visit:   Next 5 appointments (look out 90 days)     Oct 24, 2018  2:00 PM CDT   Return Visit with Elsie Nielsen LP   [facility] ([facility])    [address] [place] [address] [address]  [facility] [address]   [phone]            Oct 24, 2018  4:00 PM CDT   Return Visit with Elsie Nielsen LP   [facility] ([facility] [place])    [address]  [facility] [address]   [phone]            Oct 29, 2018  1:20 PM CDT   PHYSICAL with ANGELA Kilgore CNP   [facility] ([facility])    [address] [place] [address]   [phone]

## 2018-10-19 RX ORDER — ZOLPIDEM TARTRATE 5 MG/1
TABLET ORAL
Qty: 10 TABLET | Refills: 0 | Status: SHIPPED | OUTPATIENT
Start: 2018-10-19 | End: 2018-11-01

## 2018-10-24 ENCOUNTER — OFFICE VISIT (OUTPATIENT)
Dept: PSYCHOLOGY | Facility: CLINIC | Age: 53
End: 2018-10-24
Payer: COMMERCIAL

## 2018-10-24 DIAGNOSIS — F41.1 GENERALIZED ANXIETY DISORDER: ICD-10-CM

## 2018-10-24 DIAGNOSIS — F33.1 MAJOR DEPRESSIVE DISORDER, RECURRENT, MODERATE (H): Primary | ICD-10-CM

## 2018-10-24 DIAGNOSIS — F43.10 POSTTRAUMATIC STRESS DISORDER: ICD-10-CM

## 2018-10-24 PROCEDURE — 90791 PSYCH DIAGNOSTIC EVALUATION: CPT | Performed by: PSYCHOLOGIST

## 2018-10-24 ASSESSMENT — PATIENT HEALTH QUESTIONNAIRE - PHQ9: 5. POOR APPETITE OR OVEREATING: SEVERAL DAYS

## 2018-10-24 ASSESSMENT — ANXIETY QUESTIONNAIRES
6. BECOMING EASILY ANNOYED OR IRRITABLE: MORE THAN HALF THE DAYS
7. FEELING AFRAID AS IF SOMETHING AWFUL MIGHT HAPPEN: NOT AT ALL
1. FEELING NERVOUS, ANXIOUS, OR ON EDGE: MORE THAN HALF THE DAYS
5. BEING SO RESTLESS THAT IT IS HARD TO SIT STILL: NOT AT ALL
3. WORRYING TOO MUCH ABOUT DIFFERENT THINGS: NOT AT ALL
GAD7 TOTAL SCORE: 5
2. NOT BEING ABLE TO STOP OR CONTROL WORRYING: NOT AT ALL

## 2018-10-24 NOTE — MR AVS SNAPSHOT
"                  MRN:[id number]                      After Visit Summary   10/24/2018    Anuradha Gray    MRN: [id number]           Visit Information        Provider Department      10/24/2018 4:00 PM Elsie Nielsen LP [facility] GENERAL PSYCH      Your next 10 appointments already scheduled     Oct 29, 2018  1:20 PM CDT   PHYSICAL with ANGELA Kilgore CNP   [facility] ([facility])    [address] [place] [address]   [phone]            Oct 31, 2018  2:30 PM CDT   New Visit with Robbin Rey MD   [facility] ([facility])    [address]  [facility] [id number] [phone]              MyChart Information     [employer] lets you send messages to your doctor, view your test results, renew your prescriptions, schedule appointments and more. To sign up, go to [email or web address] . Click on \"Log in\" on the left side of the screen, which will take you to the Welcome page. Then click on \"Sign up Now\" on the right side of the page.     You will be asked to enter the access code listed below, as well as some personal information. Please follow the directions to create your username and password.     Your access code is: [email or web address]  Expires: 2018  6:19 PM     Your access code will  in 90 days. If you need help or a new code, please call your [facility] clinic or [phone].        Care EveryWhere ID     This is your Care EveryWhere ID. This could be used by other organizations to access your [facility] medical records  [id number]        Equal Access to Services     ANNABELLE HATCH : Kirill Long, waaxda luqadaha, qaybta kaalmada yovanida, mykel brunson. So [facility] [phone].    ATENCIÓN: Si habla español, tiene a hollingsworth disposición servicios gratuitos de asistencia lingüística. " Gael contreras [phone].    We comply with applicable federal civil rights laws and [place] laws. We do not discriminate on the basis of race, color, national origin, age, disability, sex, sexual orientation, or gender identity.

## 2018-10-24 NOTE — PROGRESS NOTES
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 SAMANTHA  October 24, 2018

## 2018-10-25 ASSESSMENT — ANXIETY QUESTIONNAIRES: GAD7 TOTAL SCORE: 5

## 2018-10-29 DIAGNOSIS — F51.01 PRIMARY INSOMNIA: ICD-10-CM

## 2018-10-29 RX ORDER — ZOLPIDEM TARTRATE 5 MG/1
TABLET ORAL
Qty: 10 TABLET | Refills: 0 | OUTPATIENT
Start: 2018-10-29

## 2018-10-29 NOTE — TELEPHONE ENCOUNTER
Zolpidem 5 mg tab      Last Written Prescription Date:  10/19/18  Last Fill Quantity: 10,   # refills: 0  Last Office Visit: 7/26/18  Shilpi Hellen Valdes Office visit:    Next 5 appointments (look out 90 days)     Oct 29, 2018  1:20 PM CDT   PHYSICAL with ANGELA Kilgore CNP   [facility] ([facility])    [address] [place] [id number]   [phone]                   Routing refill request to provider for review/approval because:  Drug not on the [facility], [facility] or [facility] refill protocol or controlled substance

## 2018-11-01 ENCOUNTER — TELEPHONE (OUTPATIENT)
Dept: FAMILY MEDICINE | Facility: CLINIC | Age: 53
End: 2018-11-01

## 2018-11-01 DIAGNOSIS — H10.13 ALLERGIC CONJUNCTIVITIS, BILATERAL: ICD-10-CM

## 2018-11-01 DIAGNOSIS — Z12.31 ENCOUNTER FOR SCREENING MAMMOGRAM FOR BREAST CANCER: ICD-10-CM

## 2018-11-01 DIAGNOSIS — I10 ESSENTIAL HYPERTENSION: ICD-10-CM

## 2018-11-01 DIAGNOSIS — K21.9 GASTROESOPHAGEAL REFLUX DISEASE WITHOUT ESOPHAGITIS: ICD-10-CM

## 2018-11-01 DIAGNOSIS — Z12.11 SPECIAL SCREENING FOR MALIGNANT NEOPLASMS, COLON: ICD-10-CM

## 2018-11-01 DIAGNOSIS — F51.01 PRIMARY INSOMNIA: ICD-10-CM

## 2018-11-01 DIAGNOSIS — F17.200 TOBACCO DEPENDENCE SYNDROME: ICD-10-CM

## 2018-11-01 DIAGNOSIS — G89.4 CHRONIC PAIN SYNDROME: ICD-10-CM

## 2018-11-01 DIAGNOSIS — M54.41 CHRONIC BILATERAL LOW BACK PAIN WITH RIGHT-SIDED SCIATICA: ICD-10-CM

## 2018-11-01 DIAGNOSIS — M54.2 CERVICALGIA: ICD-10-CM

## 2018-11-01 DIAGNOSIS — G89.29 CHRONIC BILATERAL LOW BACK PAIN WITH RIGHT-SIDED SCIATICA: ICD-10-CM

## 2018-11-01 DIAGNOSIS — F32.A DEPRESSION, UNSPECIFIED DEPRESSION TYPE: ICD-10-CM

## 2018-11-01 RX ORDER — METOPROLOL TARTRATE 100 MG
TABLET ORAL
Qty: 30 TABLET | Refills: 0 | Status: SHIPPED | OUTPATIENT
Start: 2018-11-01 | End: 2018-11-27

## 2018-11-01 RX ORDER — AMLODIPINE BESYLATE 10 MG/1
TABLET ORAL
Qty: 30 TABLET | Refills: 0 | Status: SHIPPED | OUTPATIENT
Start: 2018-11-01 | End: 2018-11-27

## 2018-11-01 NOTE — TELEPHONE ENCOUNTER
"Anuradha says she is out of several of her medications. She was patient of Keon Mcdonald and now has scheduled to see Dr Leggett for 11/27. She states with all of her issues she only wants to see a female MD.       Requested Prescriptions   Pending Prescriptions Disp Refills     amLODIPine (NORVASC) 10 MG tablet 30 tablet 1    Calcium Channel Blockers Protocol  Failed    11/1/2018 12:55 PM       Failed - Normal serum creatinine on file in past 12 months    No lab results found.         Passed - Blood pressure under 140/90 in past 12 months    BP Readings from Last 3 Encounters:   07/26/18 128/74   05/11/18 124/70   04/25/18 109/67                Passed - Recent (12 mo) or future (30 days) visit within the authorizing provider's specialty    Patient had office visit in the last 12 months or has a visit in the next 30 days with authorizing provider or within the authorizing provider's specialty.  See \"Patient Info\" tab in inbasket, or \"Choose Columns\" in Meds & Orders section of the refill encounter.             Passed - Patient is age 18 or older       Passed - No active pregnancy on record       Passed - No positive pregnancy test in past 12 months        metoprolol tartrate (LOPRESSOR) 100 MG tablet 90 tablet 1    Beta-Blockers Protocol Passed    11/1/2018 12:55 PM       Passed - Blood pressure under 140/90 in past 12 months    BP Readings from Last 3 Encounters:   07/26/18 128/74   05/11/18 124/70   04/25/18 109/67                Passed - Patient is age 6 or older       Passed - Recent (12 mo) or future (30 days) visit within the authorizing provider's specialty    Patient had office visit in the last 12 months or has a visit in the next 30 days with authorizing provider or within the authorizing provider's specialty.  See \"Patient Info\" tab in inbasket, or \"Choose Columns\" in Meds & Orders section of the refill encounter.              losartan (COZAAR) 100 MG tablet 90 tablet 0    Angiotensin-II Receptors Failed    " "11/1/2018 12:55 PM       Failed - Normal serum creatinine on file in past 12 months    No lab results found.         Failed - Normal serum potassium on file in past 12 months    No lab results found.                Passed - Blood pressure under 140/90 in past 12 months    BP Readings from Last 3 Encounters:   07/26/18 128/74   05/11/18 124/70   04/25/18 109/67                Passed - Recent (12 mo) or future (30 days) visit within the authorizing provider's specialty    Patient had office visit in the last 12 months or has a visit in the next 30 days with authorizing provider or within the authorizing provider's specialty.  See \"Patient Info\" tab in inbasket, or \"Choose Columns\" in Meds & Orders section of the refill encounter.             Passed - Patient is age 18 or older       Passed - No active pregnancy on record       Passed - No positive pregnancy test in past 12 months        cyanocobalamin (RA VITAMIN B-12 TR) 1000 MCG TBCR 30 tablet 3     Sig: Take 1,000 mcg by mouth daily    Vitamin Supplements (Adult) Protocol Passed    11/1/2018 12:55 PM       Passed - High dose Vitamin D not ordered       Passed - Recent (12 mo) or future (30 days) visit within the authorizing provider's specialty    Patient had office visit in the last 12 months or has a visit in the next 30 days with authorizing provider or within the authorizing provider's specialty.  See \"Patient Info\" tab in inbasket, or \"Choose Columns\" in Meds & Orders section of the refill encounter.              gabapentin (NEURONTIN) 300 MG capsule 360 capsule 3     Sig: Take 4 capsules (1,200 mg) by mouth 3 times daily    There is no refill protocol information for this order        zolpidem (AMBIEN) 5 MG tablet 10 tablet 0     Sig: TAKE ONE TABLET BY MOUTH AT BEDTIME    There is no refill protocol information for this order        cyclobenzaprine (FLEXERIL) 10 MG tablet 42 tablet 3     Sig: Take 1 tablet (10 mg) by mouth daily    There is no refill " protocol information for this order        Amlodipine 10 mg      Last Written Prescription Date:  7/16/18  Last Fill Quantity: 30,   # refills: 1  Last Office Visit: 7/26/18  [place]  Future Office visit:    Next 5 appointments (look out 90 days)     Nov 27, 2018  2:20 PM CST   PHYSICAL with Paradise Leggett MD   [facility] ([facility])    [address] [place] [phone]   [phone]                 Vit B12      Last Written Prescription Date:  3/8/18  Last Fill Quantity: 30,   # refills: 3  Last Office Visit: 7/26/18  [place]  Future Office visit:    Next 5 appointments (look out 90 days)     Nov 27, 2018  2:20 PM CST   PHYSICAL with Paradise Leggett MD   [facility] ([facility])    [address] [place] [phone]   [phone]                 Losartan 100 mg      Last Written Prescription Date:  7/31/18  Last Fill Quantity: 90,   # refills: 0  Last Office Visit: 7/26/18  Future Office visit:    Next 5 appointments (look out 90 days)     Nov 27, 2018  2:20 PM CST   PHYSICAL with Paradise Leggett MD   [facility] ([facility])    [address] [place] [phone]   [phone]                 Metoprolol 100 mg      Last Written Prescription Date:  5/18/18  Last Fill Quantity: 90,   # refills: 1  Last Office Visit: 7/26/18  [place]  Future Office visit:    Next 5 appointments (look out 90 days)     Nov 27, 2018  2:20 PM CST   PHYSICAL with Paradise Leggett MD   [facility] ([facility])    [address] [place] [phone]   [phone]                 Flexeril 10 mg      Last Written Prescription Date:  3/8/18  Last Fill Quantity: 42,   # refills: 3  Last Office Visit: 7/26/18  [place]  Future Office visit:    Next 5 appointments (look out 90 days)     Nov 27, 2018  2:20 PM CST   PHYSICAL with Paradise Horan  MD Oleksandr   [facility]    [address] [place] [phone]   [phone]                   Routing refill request to provider for review/approval because:  Drug not on the [facility], [facility] or [facility] refill protocol or controlled substance      Gabapentin 300 mg      Last Written Prescription Date:  7/26/18  Last Fill Quantity: 360,   # refills: 3  Last Office Visit: 7/26/18  [place]  Future Office visit:    Next 5 appointments (look out 90 days)     Nov 27, 2018  2:20 PM CST   PHYSICAL with Paradise Leggett MD   [facility]    [address] [place] [phone]   [phone]                   Routing refill request to provider for review/approval because:  Drug not on the [facility], [facility] or [facility] refill protocol or controlled substance    Zolpidem 5 mg      Last Written Prescription Date:  10/19/18  Last Fill Quantity: 10,   # refills: 0  Last Office Visit: 7/26/18  [place]  Future Office visit:    Next 5 appointments (look out 90 days)     Nov 27, 2018  2:20 PM CST   PHYSICAL with Paradise Leggett MD   [facility]    [address] [place] [phone]   [phone]                   Routing refill request to provider for review/approval because:  Drug not on the [facility], [facility] or [facility] refill protocol or controlled substance

## 2018-11-01 NOTE — TELEPHONE ENCOUNTER
Medication (amlodipine) is being filled for 1 time refill only due to:  Patient needs labs Cr.   Medication (metoprolol) is being filled for 1 time refill only due to:  Has OV 11/27/18  Medication (cyancobalamin) is being filled for 1 time refill only due to:  Has OV 11/27/18      Routing refill requests for gabapentin, zolpidem and cyclobenzaprine to provider for review/approval because:  Drugs not on the [facility] refill protocol     Routing refill request for losartan to provider for review/approval because:  Drug interaction warning    Drug-Drug Interaction Report    Potassium-Sparing Diuretics / Angiotensin II Receptor Antagonists    Significance: Major     Warning: The risk of hyperkalemia may be increased when potassium-sparing diuretics are co-administered with angiotensin II receptor antagonists.    Onset: Delayed    Document Level: Suspected    Interacting Medications/Orders:  Potassium-Sparing Diuretics  Oral, Systemic Angiotensin II Receptor Antagonists  Oral, Systemic   1. spironolactone    Order ([id number]): spironolactone (ALDACTONE) 25 MG tablet Route: Oral  Start: 10/15/2018 End: none Frequency: DAILY 1. losartan    Order: losartan (COZAAR) 100 MG tablet Route: none  Start: 11/01/2018 End: none Frequency:     Management Code: Professional review suggested    Effects: The risk of hyperkalemia may be increased when potassium-sparing diuretics are co-administered with angiotensin II receptor antagonists.    Mechanism: Decreased aldosterone activity by angiotensin II receptor antagonists may function synergistically with potassium conservation by potassium-sparing diuretics to produce substantial hyperkalemia.    Management: Monitor serum potassium concentrations closely in patients receiving angiotensin II receptor antagonists with potassium-sparing diuretics.    Discussion: From January 1999 to December 2002, 44 patients with congestive heart failure who were taking spironolactone with either an ACE  inhibitor or angiotensin receptor blocker presented to the authors for treatment of life-threatening hyperkalemia (2). Symptoms on admission were noted to be vomiting (19 patients), diarrhea (8 patients), bradyarrhythmia (14 patients), muscle weakness and paralysis (27 patients), and severe dehydration (28 patients). Thirty-seven of the patients required immediate hemodialysis and 7 received conventional potassium-lowering therapies. Two patients developed fatal complications (sepsis in one, gastrointestinal bleeding in the other) during their intensive care stay. Based on the demographics of this patient population, caution is advised when spironolactone is administered to patients on an ACE inhibitor or angiotensin receptor blocker who have: advanced age, spironolactone dose greater than 25 mg/day, reduced renal function, and type 2 diabetes mellitus (2). An 89-year-old woman developed life-threatening hyperkalemia during coadministration of spironolactone and irbesartan (3). In addition, she was receiving 20 mEq/day of potassium chloride. Her serum potassium and creatinine were 7.2 mEq/L and 3/1 mg/dL, respectively. She developed cardiac arrest and . Official product labeling for Midamor (amiloride) (1) states that the risk of hyperkalemia may be increased when potassium-conserving agents are administered concomitantly with angiotensin II receptor antagonists. Close clinical monitoring of serum potassium is indicated, especially during the early stages of concomitant therapy. Other potential signs of more severe hyperkalemia may include various types of EKG changes.    References: 1. Official package labeling for Midamor (amiloride). [employer] [place], [place] (2002). 2. Adolfo BECKFORD et al: Br Med J 327:147(2003). 3. Liz PRITCHARD et al: Am J Cardiol 90:662(2002).    DTMS Copyright 2018 [employer]. All rights reserved.  Version 18.4 Expires 2019     Joellen SHAW RN

## 2018-11-02 RX ORDER — ZOLPIDEM TARTRATE 5 MG/1
TABLET ORAL
Qty: 10 TABLET | Refills: 0 | Status: SHIPPED | OUTPATIENT
Start: 2018-11-02 | End: 2018-11-27

## 2018-11-02 RX ORDER — CYCLOBENZAPRINE HCL 10 MG
10 TABLET ORAL DAILY
Qty: 42 TABLET | Refills: 0 | Status: SHIPPED | OUTPATIENT
Start: 2018-11-02

## 2018-11-02 RX ORDER — LOSARTAN POTASSIUM 100 MG/1
TABLET ORAL
Qty: 30 TABLET | Refills: 0 | Status: SHIPPED | OUTPATIENT
Start: 2018-11-02 | End: 2018-11-09

## 2018-11-02 RX ORDER — GABAPENTIN 300 MG/1
1200 CAPSULE ORAL 3 TIMES DAILY
Qty: 120 CAPSULE | Refills: 0 | Status: SHIPPED | OUTPATIENT
Start: 2018-11-16 | End: 2018-11-27

## 2018-11-02 NOTE — TELEPHONE ENCOUNTER
checked. Should not need Neurontin until 11/17/18. Last Ambien filled 11/19 for 10 tablets. Flexeril last filled by Keon Mcdonald in March. Pt has appointment to be seen but not until the 27th with Dr Leggett. She no showed her last appointment on 10/29/18 with Mica Syed and also cancelled her appointment with Pain clinic on 10/31/18. Please advise. Mary Jones RN

## 2018-11-02 NOTE — TELEPHONE ENCOUNTER
Please inform patient that I have refilled her prescription.  For future refills patient will need to be re seen.    Shilpi Macedo CNP

## 2018-11-09 DIAGNOSIS — I10 ESSENTIAL HYPERTENSION: ICD-10-CM

## 2018-11-09 RX ORDER — LOSARTAN POTASSIUM 100 MG/1
TABLET ORAL
Qty: 30 TABLET | Refills: 0 | Status: SHIPPED | OUTPATIENT
Start: 2018-11-09 | End: 2018-11-27

## 2018-11-09 RX ORDER — SPIRONOLACTONE 25 MG/1
25 TABLET ORAL DAILY
Qty: 30 TABLET | Refills: 0 | OUTPATIENT
Start: 2018-11-09

## 2018-11-09 NOTE — TELEPHONE ENCOUNTER
Routing refill request to provider for review/approval because:  Labs not current:  BMP   Drug interaction warning    Drug-Drug Interaction Report    Potassium-Sparing Diuretics / Angiotensin II Receptor Antagonists    Significance: Major     Warning: The risk of hyperkalemia may be increased when potassium-sparing diuretics are co-administered with angiotensin II receptor antagonists.    Onset: Delayed    Document Level: Suspected    Interacting Medications/Orders:  Potassium-Sparing Diuretics  Oral, Systemic Angiotensin II Receptor Antagonists  Oral, Systemic   1. spironolactone    Order: spironolactone (ALDACTONE) 25 MG tablet Route: Oral  Start: 11/09/2018 End: none Frequency: DAILY 1. losartan    Order ([id number]): losartan (COZAAR) 100 MG tablet Route: none  Start: 11/09/2018 End: none Frequency:     Management Code: Professional review suggested    Effects: The risk of hyperkalemia may be increased when potassium-sparing diuretics are co-administered with angiotensin II receptor antagonists.    Mechanism: Decreased aldosterone activity by angiotensin II receptor antagonists may function synergistically with potassium conservation by potassium-sparing diuretics to produce substantial hyperkalemia.    Management: Monitor serum potassium concentrations closely in patients receiving angiotensin II receptor antagonists with potassium-sparing diuretics.    Discussion: From January 1999 to December 2002, 44 patients with congestive heart failure who were taking spironolactone with either an ACE inhibitor or angiotensin receptor blocker presented to the authors for treatment of life-threatening hyperkalemia (2). Symptoms on admission were noted to be vomiting (19 patients), diarrhea (8 patients), bradyarrhythmia (14 patients), muscle weakness and paralysis (27 patients), and severe dehydration (28 patients). Thirty-seven of the patients required immediate hemodialysis and 7 received conventional potassium-lowering  therapies. Two patients developed fatal complications (sepsis in one, gastrointestinal bleeding in the other) during their intensive care stay. Based on the demographics of this patient population, caution is advised when spironolactone is administered to patients on an ACE inhibitor or angiotensin receptor blocker who have: advanced age, spironolactone dose greater than 25 mg/day, reduced renal function, and type 2 diabetes mellitus (2). An 89-year-old woman developed life-threatening hyperkalemia during coadministration of spironolactone and irbesartan (3). In addition, she was receiving 20 mEq/day of potassium chloride. Her serum potassium and creatinine were 7.2 mEq/L and 3/1 mg/dL, respectively. She developed cardiac arrest and . Official product labeling for Midamor (amiloride) (1) states that the risk of hyperkalemia may be increased when potassium-conserving agents are administered concomitantly with angiotensin II receptor antagonists. Close clinical monitoring of serum potassium is indicated, especially during the early stages of concomitant therapy. Other potential signs of more severe hyperkalemia may include various types of EKG changes.    References: 1. Official package labeling for Midamor (amiloride). [employer] [place], [place] (2002). 2. Adolfo E et al: Br Med J 327:147(2003). 3. Liz DA et al: Am J Cardiol 90:662(2002).    DTMS Copyright 2018 [employer]. All rights reserved.  Version 18.4 Expires 2019     Joellen SHAW RN

## 2018-11-09 NOTE — TELEPHONE ENCOUNTER
"Requested Prescriptions   Pending Prescriptions Disp Refills     spironolactone (ALDACTONE) 25 MG tablet 30 tablet 0     Sig: Take 1 tablet (25 mg) by mouth daily    Diuretics (Including Combos) Protocol Failed    11/9/2018  1:21 PM       Failed - Normal serum creatinine on file in past 12 months    No lab results found.          Failed - Normal serum potassium on file in past 12 months    No lab results found.                Failed - Normal serum sodium on file in past 12 months    No lab results found.          Passed - Blood pressure under 140/90 in past 12 months    BP Readings from Last 3 Encounters:   07/26/18 128/74   05/11/18 124/70   04/25/18 109/67                Passed - Recent (12 mo) or future (30 days) visit within the authorizing provider's specialty    Patient had office visit in the last 12 months or has a visit in the next 30 days with authorizing provider or within the authorizing provider's specialty.  See \"Patient Info\" tab in inbasket, or \"Choose Columns\" in Meds & Orders section of the refill encounter.             Passed - Patient is age 18 or older       Passed - No active pregancy on record       Passed - No positive pregnancy test in past 12 months        Last Written Prescription Date:  10/15/18  Last Fill Quantity: 30,  # refills: 0   Last office visit: 7/26/2018 with prescribing provider:  Shilpi Macedo  Future Office Visit:   Next 5 appointments (look out 90 days)     Nov 27, 2018  2:20 PM CST   PHYSICAL with Paradise Leggett MD   [facility] ([facility])    [address] [place] [address] [phone]                 "

## 2018-11-09 NOTE — TELEPHONE ENCOUNTER
"Requested Prescriptions   Pending Prescriptions Disp Refills     losartan (COZAAR) 100 MG tablet [Pharmacy Med Name: LOSARTAN 100MG TABLETS] 30 tablet 0     Sig: TAKE 1 TABLET(100 MG) BY MOUTH DAILY    Angiotensin-II Receptors Failed    11/9/2018  9:51 AM       Failed - Normal serum creatinine on file in past 12 months    No lab results found.         Failed - Normal serum potassium on file in past 12 months    No lab results found.                Passed - Blood pressure under 140/90 in past 12 months    BP Readings from Last 3 Encounters:   07/26/18 128/74   05/11/18 124/70   04/25/18 109/67                Passed - Recent (12 mo) or future (30 days) visit within the authorizing provider's specialty    Patient had office visit in the last 12 months or has a visit in the next 30 days with authorizing provider or within the authorizing provider's specialty.  See \"Patient Info\" tab in inbasket, or \"Choose Columns\" in Meds & Orders section of the refill encounter.             Passed - Patient is age 18 or older       Passed - No active pregnancy on record       Passed - No positive pregnancy test in past 12 months      losartan (COZAAR) 100 MG tablet  Last Written Prescription Date:  11/02/2018  Last Fill Quantity: 30 tablet,  # refills: 0   Last office visit: 7/26/2018 with prescribing provider:  gasper Macedo   Future Office Visit:   Next 5 appointments (look out 90 days)     Nov 27, 2018  2:20 PM CST   PHYSICAL with Paradise Leggett MD   [facility]    [address] [place] [id number] [phone]                 Yanely ROTH (R) (M)      "

## 2018-11-15 ENCOUNTER — DOCUMENTATION ONLY (OUTPATIENT)
Dept: PSYCHOLOGY | Facility: CLINIC | Age: 53
End: 2018-11-15
Payer: COMMERCIAL

## 2018-11-15 DIAGNOSIS — F43.10 POSTTRAUMATIC STRESS DISORDER: Primary | ICD-10-CM

## 2018-11-15 DIAGNOSIS — F33.0 MAJOR DEPRESSIVE DISORDER, RECURRENT, MILD (H): ICD-10-CM

## 2018-11-15 DIAGNOSIS — F41.1 GENERALIZED ANXIETY DISORDER: ICD-10-CM

## 2018-11-15 PROCEDURE — 96101 HC PSYCH TST BY PSYCHOLOGIST/MD, PER HR MMPI-2: CPT | Performed by: PSYCHOLOGIST

## 2018-11-15 NOTE — PROGRESS NOTES
[facility]  Psychological Evaluation         Patient: Anuradha Gray  YOB: 1983  MRN: [id number]      [place] Multiphasic Personality Inventory--Second Edition (MMPI-2)  Client completed the MMPI-2, a self-report measure of personality and mental health functioning, as part of her evaluation. Results of validity scales indicate the client responded in an open and consistent manner resulting in a valid profile. The following results are likely to be an accurate reflection of client's current functioning. Individuals with similar profiles tend to report multiple cognitive, sensory, and musculoskeletal symptoms. They are highly distressed and anxious about physical illness and far sudden onset of severe illness. Individuals with similar profiles experience worry, fearfulness, depression, and agitation. They are likely to experience distractibility, forgetfulness, dissociation,  impaired judgment, derealization, intrusive thoughts, and cognitive disorganization. They often spend much time in daydreaming or fantasy, and delusional ideation and hallucinations should be ruled out. They are also likely to experience depression characterized by apathy, anhedonia, lethargy, sleep disturbance, and depressive thoughts and attitudes. Anger, hostility, and irritability are often present.  They may be seen as mckinley, argumentative, and resentful. They are often quick to take offense and feel victimized. Individuals with similar profiles often experience severe social alienation, mistrust, and suspicion. They may be passive-aggressive and avoidant in relationships and fear sudden rejection and losses of support. They are likely to feel uncomfortable in and avoid social interactions. They generally feel inadequate and inferior and feel unable to perform responsibilies. They often ruminate about perceived failures, identity concerns, and intrusive thoughts. Individuals with similar profiles may have a  history of maltreatment and chronic relationship losses. They tend to be impulsive, nonconforming, sensation seeking, and fearless or reckless. They have a tendency to sacrifice long-term goals for short-term satisfactions.

## 2018-11-19 DIAGNOSIS — F32.A DEPRESSION, UNSPECIFIED DEPRESSION TYPE: ICD-10-CM

## 2018-11-19 DIAGNOSIS — F17.200 TOBACCO DEPENDENCE SYNDROME: ICD-10-CM

## 2018-11-19 DIAGNOSIS — K21.9 GASTROESOPHAGEAL REFLUX DISEASE WITHOUT ESOPHAGITIS: ICD-10-CM

## 2018-11-19 DIAGNOSIS — G89.29 CHRONIC BILATERAL LOW BACK PAIN WITH RIGHT-SIDED SCIATICA: ICD-10-CM

## 2018-11-19 DIAGNOSIS — G89.4 CHRONIC PAIN SYNDROME: ICD-10-CM

## 2018-11-19 DIAGNOSIS — F51.01 PRIMARY INSOMNIA: ICD-10-CM

## 2018-11-19 DIAGNOSIS — M54.2 CERVICALGIA: ICD-10-CM

## 2018-11-19 DIAGNOSIS — I10 ESSENTIAL HYPERTENSION: ICD-10-CM

## 2018-11-19 DIAGNOSIS — M54.41 CHRONIC BILATERAL LOW BACK PAIN WITH RIGHT-SIDED SCIATICA: ICD-10-CM

## 2018-11-19 DIAGNOSIS — Z12.11 SPECIAL SCREENING FOR MALIGNANT NEOPLASMS, COLON: ICD-10-CM

## 2018-11-19 DIAGNOSIS — Z12.31 ENCOUNTER FOR SCREENING MAMMOGRAM FOR BREAST CANCER: ICD-10-CM

## 2018-11-19 NOTE — TELEPHONE ENCOUNTER
"Requested Prescriptions   Pending Prescriptions Disp Refills     omeprazole (PRILOSEC) 20 MG CR capsule 30 capsule 3     Sig: Take 1 capsule (20 mg) by mouth daily    PPI Protocol Passed    11/19/2018  9:34 AM       Passed - Not on Clopidogrel (unless Pantoprazole ordered)       Passed - No diagnosis of osteoporosis on record       Passed - Recent (12 mo) or future (30 days) visit within the authorizing provider's specialty    Patient had office visit in the last 12 months or has a visit in the next 30 days with authorizing provider or within the authorizing provider's specialty.  See \"Patient Info\" tab in inbasket, or \"Choose Columns\" in Meds & Orders section of the refill encounter.      Last Written Prescription Date:  7/26/18  Last Fill Quantity: 30,  # refills: 3   Last office visit: 7/26/2018 with prescribing provider:     Future Office Visit:   Next 5 appointments (look out 90 days)     Nov 27, 2018  2:20 PM CST   PHYSICAL with Paradise Leggett MD   [facility] ([facility])    [address] [place] [phone]                            Passed - Patient is age 18 or older       Passed - No active pregnacy on record       Passed - No positive pregnancy test in past 12 months          "

## 2018-11-21 DIAGNOSIS — F51.01 PRIMARY INSOMNIA: ICD-10-CM

## 2018-11-21 RX ORDER — ZOLPIDEM TARTRATE 5 MG/1
TABLET ORAL
Qty: 10 TABLET | Refills: 0 | OUTPATIENT
Start: 2018-11-21

## 2018-11-21 NOTE — TELEPHONE ENCOUNTER
zolpidem (AMBIEN) 5 MG tablet      Last Written Prescription Date:  11/2/18  Last Fill Quantity: 10,   # refills: 0  Last Office Visit: 10/24/18  Future Office visit:    Next 5 appointments (look out 90 days)     Nov 27, 2018  2:20 PM CST   PHYSICAL with Paradise Leggett MD   [facility] ([facility])    [address] [place] [phone]   [phone]                   Routing refill request to provider for review/approval because:  Drug not on the [facility], [facility] or [facility] refill protocol or controlled substance

## 2018-11-21 NOTE — TELEPHONE ENCOUNTER
Routing refill request to provider for review/approval because:  Drug not on the [facility] refill protocol   Joellen SHAW RN

## 2018-11-23 NOTE — TELEPHONE ENCOUNTER
Shilpi Macedo refused the Ambien, says needs to be seen.  Patient called and says she will address with Dr. Leggett on 11-27-18 appointment.    MICHAEL Madsen

## 2018-11-27 ENCOUNTER — OFFICE VISIT (OUTPATIENT)
Dept: FAMILY MEDICINE | Facility: CLINIC | Age: 53
End: 2018-11-27
Payer: COMMERCIAL

## 2018-11-27 ENCOUNTER — RESULT FOLLOW UP (OUTPATIENT)
Dept: FAMILY MEDICINE | Facility: CLINIC | Age: 53
End: 2018-11-27

## 2018-11-27 VITALS
HEIGHT: 64 IN | HEART RATE: 69 BPM | TEMPERATURE: 98 F | BODY MASS INDEX: 27.69 KG/M2 | OXYGEN SATURATION: 98 % | WEIGHT: 162.2 LBS | SYSTOLIC BLOOD PRESSURE: 112 MMHG | DIASTOLIC BLOOD PRESSURE: 56 MMHG

## 2018-11-27 DIAGNOSIS — I63.9 CEREBROVASCULAR ACCIDENT (CVA), UNSPECIFIED MECHANISM (H): ICD-10-CM

## 2018-11-27 DIAGNOSIS — F17.200 TOBACCO DEPENDENCE SYNDROME: ICD-10-CM

## 2018-11-27 DIAGNOSIS — Z12.11 SCREEN FOR COLON CANCER: ICD-10-CM

## 2018-11-27 DIAGNOSIS — Z00.01 ENCOUNTER FOR WELL ADULT EXAM WITH ABNORMAL FINDINGS: Primary | ICD-10-CM

## 2018-11-27 DIAGNOSIS — F32.A DEPRESSION, UNSPECIFIED DEPRESSION TYPE: ICD-10-CM

## 2018-11-27 DIAGNOSIS — F51.01 PRIMARY INSOMNIA: ICD-10-CM

## 2018-11-27 DIAGNOSIS — I10 ESSENTIAL HYPERTENSION: ICD-10-CM

## 2018-11-27 DIAGNOSIS — Z23 NEED FOR PROPHYLACTIC VACCINATION AND INOCULATION AGAINST INFLUENZA: ICD-10-CM

## 2018-11-27 DIAGNOSIS — Z01.419 ENCOUNTER FOR GYNECOLOGICAL EXAMINATION WITH PAPANICOLAOU SMEAR OF CERVIX: ICD-10-CM

## 2018-11-27 DIAGNOSIS — R87.610 ASCUS WITH POSITIVE HIGH RISK HPV CERVICAL: ICD-10-CM

## 2018-11-27 DIAGNOSIS — R87.810 ASCUS WITH POSITIVE HIGH RISK HPV CERVICAL: ICD-10-CM

## 2018-11-27 DIAGNOSIS — M54.2 CERVICALGIA: ICD-10-CM

## 2018-11-27 LAB
ALBUMIN SERPL-MCNC: 3.8 G/DL (ref 3.4–5)
ALP SERPL-CCNC: 79 U/L (ref 40–150)
ALT SERPL W P-5'-P-CCNC: 24 U/L (ref 0–50)
ANION GAP SERPL CALCULATED.3IONS-SCNC: 5 MMOL/L (ref 3–14)
AST SERPL W P-5'-P-CCNC: 20 U/L (ref 0–45)
BASOPHILS # BLD AUTO: 0.1 10E9/L (ref 0–0.2)
BASOPHILS NFR BLD AUTO: 0.7 %
BILIRUB DIRECT SERPL-MCNC: 0.1 MG/DL (ref 0–0.2)
BILIRUB SERPL-MCNC: 0.5 MG/DL (ref 0.2–1.3)
BUN SERPL-MCNC: 20 MG/DL (ref 7–30)
CALCIUM SERPL-MCNC: 8.8 MG/DL (ref 8.5–10.1)
CHLORIDE SERPL-SCNC: 111 MMOL/L (ref 94–109)
CHOLEST SERPL-MCNC: 136 MG/DL
CO2 SERPL-SCNC: 26 MMOL/L (ref 20–32)
CREAT SERPL-MCNC: 1.15 MG/DL (ref 0.52–1.04)
DIFFERENTIAL METHOD BLD: ABNORMAL
EOSINOPHIL # BLD AUTO: 0.4 10E9/L (ref 0–0.7)
EOSINOPHIL NFR BLD AUTO: 2.9 %
ERYTHROCYTE [DISTWIDTH] IN BLOOD BY AUTOMATED COUNT: 13.8 % (ref 10–15)
GFR SERPL CREATININE-BSD FRML MDRD: 50 ML/MIN/1.7M2
GLUCOSE SERPL-MCNC: 89 MG/DL (ref 70–99)
HCT VFR BLD AUTO: 41.8 % (ref 35–47)
HDLC SERPL-MCNC: 52 MG/DL
HGB BLD-MCNC: 13.8 G/DL (ref 11.7–15.7)
LDLC SERPL CALC-MCNC: 35 MG/DL
LYMPHOCYTES # BLD AUTO: 1.8 10E9/L (ref 0.8–5.3)
LYMPHOCYTES NFR BLD AUTO: 14.3 %
MCH RBC QN AUTO: 30.2 PG (ref 26.5–33)
MCHC RBC AUTO-ENTMCNC: 33 G/DL (ref 31.5–36.5)
MCV RBC AUTO: 92 FL (ref 78–100)
MONOCYTES # BLD AUTO: 1 10E9/L (ref 0–1.3)
MONOCYTES NFR BLD AUTO: 8.2 %
NEUTROPHILS # BLD AUTO: 9.1 10E9/L (ref 1.6–8.3)
NEUTROPHILS NFR BLD AUTO: 73.9 %
NONHDLC SERPL-MCNC: 84 MG/DL
PLATELET # BLD AUTO: 384 10E9/L (ref 150–450)
POTASSIUM SERPL-SCNC: 4.6 MMOL/L (ref 3.4–5.3)
PROT SERPL-MCNC: 7.3 G/DL (ref 6.8–8.8)
RBC # BLD AUTO: 4.57 10E12/L (ref 3.8–5.2)
SODIUM SERPL-SCNC: 142 MMOL/L (ref 133–144)
TRIGL SERPL-MCNC: 244 MG/DL
TSH SERPL DL<=0.005 MIU/L-ACNC: 0.97 MU/L (ref 0.4–4)
WBC # BLD AUTO: 12.4 10E9/L (ref 4–11)

## 2018-11-27 PROCEDURE — 90682 RIV4 VACC RECOMBINANT DNA IM: CPT | Performed by: FAMILY MEDICINE

## 2018-11-27 PROCEDURE — 99396 PREV VISIT EST AGE 40-64: CPT | Performed by: FAMILY MEDICINE

## 2018-11-27 PROCEDURE — 80048 BASIC METABOLIC PNL TOTAL CA: CPT | Performed by: FAMILY MEDICINE

## 2018-11-27 PROCEDURE — 80076 HEPATIC FUNCTION PANEL: CPT | Performed by: FAMILY MEDICINE

## 2018-11-27 PROCEDURE — 90471 IMMUNIZATION ADMIN: CPT | Performed by: FAMILY MEDICINE

## 2018-11-27 PROCEDURE — 87624 HPV HI-RISK TYP POOLED RSLT: CPT | Performed by: FAMILY MEDICINE

## 2018-11-27 PROCEDURE — G0124 SCREEN C/V THIN LAYER BY MD: HCPCS | Performed by: FAMILY MEDICINE

## 2018-11-27 PROCEDURE — 85025 COMPLETE CBC W/AUTO DIFF WBC: CPT | Performed by: FAMILY MEDICINE

## 2018-11-27 PROCEDURE — 80061 LIPID PANEL: CPT | Performed by: FAMILY MEDICINE

## 2018-11-27 PROCEDURE — 36415 COLL VENOUS BLD VENIPUNCTURE: CPT | Performed by: FAMILY MEDICINE

## 2018-11-27 PROCEDURE — G0145 SCR C/V CYTO,THINLAYER,RESCR: HCPCS | Performed by: FAMILY MEDICINE

## 2018-11-27 PROCEDURE — 84443 ASSAY THYROID STIM HORMONE: CPT | Performed by: FAMILY MEDICINE

## 2018-11-27 PROCEDURE — G0476 HPV COMBO ASSAY CA SCREEN: HCPCS | Performed by: FAMILY MEDICINE

## 2018-11-27 PROCEDURE — 99214 OFFICE O/P EST MOD 30 MIN: CPT | Mod: 25 | Performed by: FAMILY MEDICINE

## 2018-11-27 RX ORDER — LOSARTAN POTASSIUM 100 MG/1
TABLET ORAL
Qty: 90 TABLET | Refills: 3 | Status: SHIPPED | OUTPATIENT
Start: 2018-11-27 | End: 2019-07-05

## 2018-11-27 RX ORDER — METOPROLOL TARTRATE 100 MG
TABLET ORAL
Qty: 90 TABLET | Refills: 3 | Status: SHIPPED | OUTPATIENT
Start: 2018-11-27 | End: 2019-07-05

## 2018-11-27 RX ORDER — CITALOPRAM HYDROBROMIDE 20 MG/1
20 TABLET ORAL DAILY
Qty: 30 TABLET | Refills: 11 | Status: SHIPPED | OUTPATIENT
Start: 2018-11-27

## 2018-11-27 RX ORDER — ZOLPIDEM TARTRATE 5 MG/1
TABLET ORAL
Qty: 30 TABLET | Refills: 5 | Status: SHIPPED | OUTPATIENT
Start: 2018-11-27

## 2018-11-27 RX ORDER — NICOTINE 21 MG/24HR
1 PATCH, TRANSDERMAL 24 HOURS TRANSDERMAL EVERY 24 HOURS
Qty: 30 PATCH | Refills: 3 | Status: SHIPPED | OUTPATIENT
Start: 2018-11-27

## 2018-11-27 RX ORDER — AMLODIPINE BESYLATE 10 MG/1
TABLET ORAL
Qty: 90 TABLET | Refills: 3 | Status: SHIPPED | OUTPATIENT
Start: 2018-11-27

## 2018-11-27 RX ORDER — GABAPENTIN 300 MG/1
1200 CAPSULE ORAL 3 TIMES DAILY
Qty: 120 CAPSULE | Refills: 0 | Status: SHIPPED | OUTPATIENT
Start: 2018-11-27 | End: 2018-12-06

## 2018-11-27 RX ORDER — DULOXETIN HYDROCHLORIDE 60 MG/1
CAPSULE, DELAYED RELEASE ORAL
Qty: 30 CAPSULE | Refills: 5 | Status: SHIPPED | OUTPATIENT
Start: 2018-11-27

## 2018-11-27 ASSESSMENT — PATIENT HEALTH QUESTIONNAIRE - PHQ9
SUM OF ALL RESPONSES TO PHQ QUESTIONS 1-9: 9
SUM OF ALL RESPONSES TO PHQ QUESTIONS 1-9: 9

## 2018-11-27 ASSESSMENT — ENCOUNTER SYMPTOMS
HEMATOCHEZIA: 0
CHILLS: 0
DIARRHEA: 1
COUGH: 0
CONSTIPATION: 0
EYE PAIN: 0
DIZZINESS: 0
HEMATURIA: 0
ABDOMINAL PAIN: 0

## 2018-11-27 ASSESSMENT — ANXIETY QUESTIONNAIRES
GAD7 TOTAL SCORE: 5
1. FEELING NERVOUS, ANXIOUS, OR ON EDGE: SEVERAL DAYS
GAD7 TOTAL SCORE: 5
3. WORRYING TOO MUCH ABOUT DIFFERENT THINGS: SEVERAL DAYS
7. FEELING AFRAID AS IF SOMETHING AWFUL MIGHT HAPPEN: NOT AT ALL
5. BEING SO RESTLESS THAT IT IS HARD TO SIT STILL: NOT AT ALL
4. TROUBLE RELAXING: SEVERAL DAYS
7. FEELING AFRAID AS IF SOMETHING AWFUL MIGHT HAPPEN: NOT AT ALL
6. BECOMING EASILY ANNOYED OR IRRITABLE: SEVERAL DAYS
2. NOT BEING ABLE TO STOP OR CONTROL WORRYING: SEVERAL DAYS
GAD7 TOTAL SCORE: 5

## 2018-11-27 NOTE — PROGRESS NOTES
SUBJECTIVE:   CC: Anuradha Gray is an 53 year old woman who presents for preventive health visit.     Physical   Annual:     Getting at least 3 servings of Calcium per day:  Yes    Bi-annual eye exam:  Yes    Dental care twice a year:  NO    Sleep apnea or symptoms of sleep apnea:  Sleep apnea    Diet:  Low salt    Frequency of exercise:  4-5 days/week    Duration of exercise:  15-30 minutes    Taking medications regularly:  Yes    Medication side effects:  None    Additional concerns today:  No    PHQ-2 Total Score: 2  Pain   Pertinent negatives include no abdominal pain, chest pain, chills, congestion or coughing.           Left arm goes to sleep when she lays on her back      Hypertension Follow-up      Outpatient blood pressures are not being checked.    Low Salt Diet: no added salt    Cerebrovascular Follow-up      Patient history: ischemic cerebrovascular incident    Residual symptoms: Weakness in the arm on the right and Weakness in the leg on the right    Worsened or new symptoms since last visit: No    Daily aspirin use: Yes    Hypertension controlled: Yes    Depression Followup    Status since last visit: Stable     See PHQ-9 for current symptoms.  Other associated symptoms: None    Complicating factors:   Significant life event:  No   Current substance abuse:  None  Anxiety or Panic symptoms:  No    PHQ 4/25/2018 9/19/2018 11/27/2018   PHQ-9 Total Score 5 15 9   Q9: Suicide Ideation Not at all Several days Not at all   F/U: Thoughts of suicide or self-harm - No -   F/U: Safety concerns - No -     In the past two weeks have you had thoughts of suicide or self-harm?  No.    Do you have concerns about your personal safety or the safety of others?   No  PHQ-9  English  PHQ-9   Any Language  Suicide Assessment Five-step Evaluation and Treatment (SAFE-T)  Chronic Pain Follow-Up       Type / Location of Pain: neck   Analgesia/pain control:       Recent changes:  same      Overall control: Tolerable with  discomfort  Activity level/function:      Daily activities:  Able to do moderate activities    Work:  not applicable  Adverse effects:  No  Adherance    Taking medication as directed?  Yes    Participating in other treatments: yes  Risk Factors:    Sleep:  Fair    Mood/anxiety:  controlled    Recent family or social stressors:  none noted    Other aggravating factors: none  PHQ-9 SCORE 4/25/2018 9/19/2018 11/27/2018   PHQ-9 Total Score MyChart - - 9 (Mild depression)   PHQ-9 Total Score 5 15 9     KING[place]ON-7 SCORE 9/19/2018 10/24/2018 11/27/2018   Total Score - - 5 (mild anxiety)   Total Score 10 5 5     Encounter-Level CSA - 02/23/2018:          Controlled Substance Agreement - Scan on 3/5/2018 12:40 PM : CONTROLLED SUBSTANCE AGREEMENT (below)                Today's PHQ-2 Score:   PHQ-2 ( 1999 [employer]) 11/27/2018   Q1: Little interest or pleasure in doing things 1   Q2: Feeling down, depressed or hopeless 1   PHQ-2 Score 2   Q1: Little interest or pleasure in doing things Several days   Q2: Feeling down, depressed or hopeless Several days   PHQ-2 Score 2     PHQ-9 ([employer]) 11/27/2018   1.  Little interest or pleasure in doing things 1   2.  Feeling down, depressed, or hopeless 1   3.  Trouble falling or staying asleep, or sleeping too much 3   4.  Feeling tired or having little energy 1   5.  Poor appetite or overeating 0   6.  Feeling bad about yourself 1   7.  Trouble concentrating 2   8.  Moving slowly or restless 0   9.  Suicidal or self-harm thoughts 0   PHQ-9 Total Score 9       KING[place]ON-7   [employer], 2002; Used with Permission) 11/27/2018   1. Feeling nervous, anxious, or on edge 1   2. Not being able to stop or control worrying 1   3. Worrying too much about different things 1   4. Trouble relaxing 1   5. Being so restless that it is hard to sit still 0   6. Becoming easily annoyed or irritable 1   7. Feeling afraid, as if something awful might happen 0   KING[place]ON-7 Total Score 5     Abuse: Current or Past(Physical,  "Sexual or Emotional)- No  Do you feel safe in your environment? Yes    Social History   Substance Use Topics     Smoking status: Current Every Day Smoker     Smokeless tobacco: Never Used     Alcohol use No     Alcohol Use 11/27/2018   If you drink alcohol do you typically have greater than 3 drinks per day OR greater than 7 drinks per week? No   No flowsheet data found.    Reviewed orders with patient.  Reviewed health maintenance and updated orders accordingly - Yes  Labs reviewed in EPIC    Mammogram Screening: Patient over age 50, mutual decision to screen reflected in health maintenance.    Pertinent mammograms are reviewed under the imaging tab.    No LMP recorded. Patient is postmenopausal.    History of abnormal Pap smear: NO - age 30- 65 PAP every 3 years recommended  Last 3 Pap and HPV Results:   PAP / HPV 11/27/2018   PAP ASC-US(A)     PAP / HPV 11/27/2018   PAP ASC-US(A)     Reviewed and updated as needed this visit by clinical staff  Tobacco  Allergies  Meds  Problems  Med Hx  Surg Hx  Fam Hx  Soc Hx          Reviewed and updated as needed this visit by Provider  Allergies  Meds  Problems            Review of Systems   Constitutional: Negative for chills.   HENT: Negative for congestion and ear pain.    Eyes: Negative for pain.   Respiratory: Negative for cough.    Cardiovascular: Negative for chest pain.   Gastrointestinal: Positive for diarrhea. Negative for abdominal pain, constipation and hematochezia.   Genitourinary: Negative for hematuria.   Neurological: Negative for dizziness.          OBJECTIVE:   /56 (BP Location: Right arm, Patient Position: Chair, Cuff Size: Adult Large)  Pulse 69  Temp 98  F (36.7  C) (Tympanic)  Ht 5' 4\" (1.626 m)  Wt 162 lb 3.2 oz (73.6 kg)  SpO2 98%  Breastfeeding? No  BMI 27.84 kg/m2  Physical Exam  GENERAL: healthy, alert and no distress  EYES: Eyes grossly normal to inspection, PERRL and conjunctivae and sclerae normal  HENT: ear canals and " TM's normal, nose and mouth without ulcers or lesions  NECK: no adenopathy, no asymmetry, masses, or scars and thyroid normal to palpation  RESP: lungs clear to auscultation - no rales, rhonchi or wheezes  BREAST: normal without masses, tenderness or nipple discharge and no palpable axillary masses or adenopathy  CV: regular rate and rhythm, normal S1 S2, no S3 or S4, no murmur, click or rub, no peripheral edema and peripheral pulses strong  ABDOMEN: soft, nontender, no hepatosplenomegaly, no masses and bowel sounds normal   (female): normal female external genitalia, normal urethral meatus, vaginal mucosa pink, moist, well rugated, and normal cervix/adnexa/uterus without masses or discharge  MS: no gross musculoskeletal defects noted, no edema  SKIN: no suspicious lesions or rashes  NEURO: Normal strength and tone, mentation intact and speech normal  PSYCH: mentation appears normal, affect normal/bright    Diagnostic Test Results:  none     ASSESSMENT/PLAN:   1. Encounter for well adult exam with abnormal findings      2. Cerebrovascular accident (CVA), unspecified mechanism (H)  Stable would like a neurologist   - NEUROLOGY ADULT REFERRAL    3. Essential hypertension  Stable no change in treatment plan.   - Lipid panel reflex to direct LDL Fasting  - amLODIPine (NORVASC) 10 MG tablet; TAKE 1 TABLET(10 MG) BY MOUTH DAILY  Dispense: 90 tablet; Refill: 3  - metoprolol tartrate (LOPRESSOR) 100 MG tablet; TAKE 1 TABLET(100 MG) BY MOUTH DAILY  Dispense: 90 tablet; Refill: 3  - losartan (COZAAR) 100 MG tablet; TAKE 1 TABLET(100 MG) BY MOUTH DAILY  Dispense: 90 tablet; Refill: 3  - Hepatic panel  - Basic metabolic panel  - CBC with platelets differential  - TSH with free T4 reflex    4. Depression, unspecified depression type  Stable no change in treatment plan.   - citalopram (CELEXA) 20 MG tablet; Take 1 tablet (20 mg) by mouth daily  Dispense: 30 tablet; Refill: 11  - DULoxetine (CYMBALTA) 60 MG capsule; TAKE 1  "CAPSULE(60 MG) BY MOUTH DAILY  Dispense: 30 capsule; Refill: 5    5. Primary insomnia  stable on meds   - zolpidem (AMBIEN) 5 MG tablet; TAKE ONE TABLET BY MOUTH AT BEDTIME  Dispense: 30 tablet; Refill: 5    6. Cervicalgia  Chronic pain and is on opioids through a pain clinic I have asked her to have them Rx the gabapentin as well   - gabapentin (NEURONTIN) 300 MG capsule; Take 4 capsules (1,200 mg) by mouth 3 times daily  Dispense: 120 capsule; Refill: 0    7. Tobacco dependence syndrome    - nicotine (NICODERM CQ) 21 MG/24HR 24 hr patch; Place 1 patch onto the skin every 24 hours  Dispense: 30 patch; Refill: 3    8. Screen for colon cancer    - GASTROENTEROLOGY ADULT REF PROCEDURE ONLY    9. Need for prophylactic vaccination and inoculation against influenza    - FLU VACCINE, (RIV4) RECOMBINANT HA  , IM (FluBlok, egg free) [70743]- >18 YRS (FM recommended  50-64 YRS)  - Vaccine Administration, Initial [30191]    10. Encounter for gynecological examination with Papanicolaou smear of cervix    - HPV High Risk Types DNA Cervical  - Pap imaged thin layer screen with HPV - recommended age 30 - 65 years (select HPV order below)    COUNSELING:  Reviewed preventive health counseling, as reflected in patient instructions    BP Readings from Last 1 Encounters:   11/27/18 112/56     Estimated body mass index is 27.84 kg/(m^2) as calculated from the following:    Height as of this encounter: 5' 4\" (1.626 m).    Weight as of this encounter: 162 lb 3.2 oz (73.6 kg).           reports that she has been smoking.  She has never used smokeless tobacco.  Tobacco Cessation Action Plan: Pharmacotherapies : Nicotine patch    Counseling Resources:  ATP IV Guidelines  Pooled Cohorts Equation Calculator  Breast Cancer Risk Calculator  FRAX Risk Assessment  ICSI Preventive Guidelines  Dietary Guidelines for Americans, 2010  USDA's MyPlate  ASA Prophylaxis  Lung CA Screening    Paradise Leggett MD  [facility]  "

## 2018-11-27 NOTE — PATIENT INSTRUCTIONS

## 2018-11-27 NOTE — LETTER
December 4, 2018    Anuradha OSMAN Isaac  [address] [address]    Dear ,      We are contacting you in writing because we have been unable to reach you by phone.     We have recently received your PAP smear result. The result shows ASCUS or Atypical Squamous Cells of Undetermined Significance. This indicates a mild change only    We also tested your sample for the presence of the HPV (Human Papillomavirus). Your sample was positive for HPV. There are many types of HPV, but we test pap samples for the high risk types. HPV can be the cause of an abnormal Pap smear result.  The high risk types of HPV can also be related to the potential development of cervical cancer if not monitored and/or treated appropriately    Because of this, we need to do further testing. It is recommended that you schedule a colposcopy. Colposcopy is a way for your doctor to use a special magnifying device to look at your vulva, vagina, and cervix. If a problem is seen during colposcopy, a small sample of tissue may be collected for laboratory testing (biopsy). The exam and test will help determine the reason for your abnormal pap smear.    Please call [facility] at [phone] to schedule this procedure. Schedule this for a time when you are not due to have a period (if having regular menstrual bleeding). You can take an over the counter pain reliever 1 hour before your colposcopy. Nothing in the vagina for 24 hours before your colposcopy (no sex, douches, vaginal medications or lubricants).     If you have additional questions regarding this result, please call our registered nurse, Sita at [phone].    Sincerely,  Paradise Leggett MD/isabella

## 2018-11-27 NOTE — LETTER
January 29, 2020      Anuradha Gray  [address] [address]    Dear ,      At [facility], your health and wellness is our primary concern. That is why we are following up on an abnormal pap from 2/11/19, which was reported as ASCUS and positive for high risk HPV. Your provider had recommended that you have a Pap smear and HPV test completed by 2/11/20. Our records do not show that this has been scheduled.    It is important to complete the follow up that your provider has suggested for you to ensure that there are no worsening changes which may, over time, develop into cancer.      Please contact our office at  [phone] to schedule an appointment for a Pap smear and HPV test at your earliest convenience. If you have questions or concerns, please call the clinic and we will be happy to assist you.    If you have completed the tests outside of [facility], please have the results forwarded to our office. We will update the chart for your primary Physician to review before your next annual physical.     Thank you for choosing [facility]!    Sincerely,      Your [facility]/isabella

## 2018-11-27 NOTE — NURSING NOTE
"Chief Complaint   Patient presents with     Physical       Initial /56 (BP Location: Right arm, Patient Position: Chair, Cuff Size: Adult Large)  Pulse 69  Temp 98  F (36.7  C) (Tympanic)  Ht 5' 4\" (1.626 m)  Wt 162 lb 3.2 oz (73.6 kg)  SpO2 98%  Breastfeeding? No  BMI 27.84 kg/m2 Estimated body mass index is 27.84 kg/(m^2) as calculated from the following:    Height as of this encounter: 5' 4\" (1.626 m).    Weight as of this encounter: 162 lb 3.2 oz (73.6 kg).    Patient presents to the clinic using No DME    Health Maintenance that is potentially due pending provider review:  Pap Smear    Possibly completing today per provider review.    Is there anyone who you would like to be able to receive your results? No  If yes have patient fill out MINGO    "

## 2018-11-27 NOTE — PROGRESS NOTES

## 2018-11-28 ASSESSMENT — ANXIETY QUESTIONNAIRES: GAD7 TOTAL SCORE: 5

## 2018-11-28 ASSESSMENT — PATIENT HEALTH QUESTIONNAIRE - PHQ9: SUM OF ALL RESPONSES TO PHQ QUESTIONS 1-9: 9

## 2018-11-30 LAB
COPATH REPORT: ABNORMAL
PAP: ABNORMAL

## 2018-12-03 LAB
FINAL DIAGNOSIS: ABNORMAL
HPV HR 12 DNA CVX QL NAA+PROBE: POSITIVE
HPV16 DNA SPEC QL NAA+PROBE: NEGATIVE
HPV18 DNA SPEC QL NAA+PROBE: NEGATIVE
SPECIMEN DESCRIPTION: ABNORMAL
SPECIMEN SOURCE CVX/VAG CYTO: ABNORMAL

## 2018-12-03 NOTE — PROGRESS NOTES
11/27/18 ASCUS Pap, + HR HPV (Neg 16/18). Plan colp  12/3/18 Message left to return call.   12/4/18 Result letter sent per RN request (rlm)  1/8/19 Pt notified by phone.   2/11/19 [place] Bx & ECC - Negative. ASCUS pap (rare atypical cell present), + HR HPV (not 16 or 18). Cotest due 2/11/20 2/19/19 Pt notified. (al[place]a)  1/29/20 Cotest reminder letter sent (rlm)  02/26/20 LMTC her clinic with any questions or to schedule (rlm) CCT Tracking.

## 2018-12-06 ENCOUNTER — TELEPHONE (OUTPATIENT)
Dept: FAMILY MEDICINE | Facility: CLINIC | Age: 53
End: 2018-12-06

## 2018-12-06 DIAGNOSIS — M54.2 CERVICALGIA: ICD-10-CM

## 2018-12-06 RX ORDER — GABAPENTIN 300 MG/1
1200 CAPSULE ORAL 3 TIMES DAILY
Qty: 360 CAPSULE | Refills: 0 | Status: SHIPPED | OUTPATIENT
Start: 2018-12-06 | End: 2019-01-07

## 2018-12-06 NOTE — TELEPHONE ENCOUNTER
Erendira from [facility] in [place] is calling to verify Anuradha's Gabapentin.  Dr. Leggett ordered enough for 10 days back on 11/27.18 and Erendira is questioning if it should have been for 30 days.  Please call her at [phone].    [facility]

## 2019-01-07 DIAGNOSIS — M54.2 CERVICALGIA: ICD-10-CM

## 2019-01-07 NOTE — TELEPHONE ENCOUNTER
gabapentin (NEURONTIN) 300 MG capsule      Last Written Prescription Date:  12/6/18  Last Fill Quantity: 360,   # refills: 0  Last Office Visit: 11/27/18  Future Office visit:       Routing refill request to provider for review/approval because:  Drug not on the [facility], [facility] or [facility] refill protocol or controlled substance

## 2019-01-08 RX ORDER — GABAPENTIN 300 MG/1
CAPSULE ORAL
Qty: 360 CAPSULE | Refills: 0 | Status: SHIPPED | OUTPATIENT
Start: 2019-01-08 | End: 2019-02-01

## 2019-01-23 ENCOUNTER — OFFICE VISIT (OUTPATIENT)
Dept: FAMILY MEDICINE | Facility: CLINIC | Age: 54
End: 2019-01-23
Payer: COMMERCIAL

## 2019-01-23 VITALS
SYSTOLIC BLOOD PRESSURE: 138 MMHG | TEMPERATURE: 98.3 F | RESPIRATION RATE: 18 BRPM | HEART RATE: 83 BPM | WEIGHT: 163.6 LBS | DIASTOLIC BLOOD PRESSURE: 78 MMHG | OXYGEN SATURATION: 99 % | BODY MASS INDEX: 28.08 KG/M2

## 2019-01-23 DIAGNOSIS — F11.20 UNCOMPLICATED OPIOID DEPENDENCE (H): ICD-10-CM

## 2019-01-23 DIAGNOSIS — G89.29 OTHER CHRONIC PAIN: Primary | ICD-10-CM

## 2019-01-23 DIAGNOSIS — F17.200 TOBACCO DEPENDENCE SYNDROME: ICD-10-CM

## 2019-01-23 LAB — PHQ9 SCORE: 7

## 2019-01-23 PROCEDURE — 99214 OFFICE O/P EST MOD 30 MIN: CPT | Performed by: FAMILY MEDICINE

## 2019-01-23 ASSESSMENT — PAIN SCALES - GENERAL: PAINLEVEL: EXTREME PAIN (8)

## 2019-01-23 NOTE — PROGRESS NOTES
SUBJECTIVE:   Anuradha Gray is a 53 year old female who presents to clinic today for the following health issues:    Wants pain medication-Has been going to [facility] and they want her to get Pain med from PCP until she gets into [facility] ([place] and says she is out of her pain meds- )    Chronic Pain Follow-Up       Type / Location of Pain: neck, right arm, low back  Analgesia/pain control:       Recent changes:  worse      Overall control: Inadequate pain control  Planning on seeking medical marijuana  Activity level/function:      Daily activities:  Able to do light housework, cooking    Work:  Unable to work  Adverse effects:  No  Adherance    Taking medication as directed?  Yes    Participating in other treatments: yes  Risk Factors:    Sleep:  Poor    Mood/anxiety:  controlled    Recent family or social stressors:  Some stress, niece recent passed    Other aggravating factors: none       PHQ-9 SCORE 4/25/2018 9/19/2018 11/27/2018   PHQ-9 Total Score MyChart - - 9 (Mild depression)   PHQ-9 Total Score 5 15 9     [place]-7 SCORE 9/19/2018 10/24/2018 11/27/2018   Total Score - - 5 (mild anxiety)   Total Score 10 5 5     Encounter-Level CSA - 02/23/2018:    Controlled Substance Agreement - Scan on 3/5/2018 12:40 PM: CONTROLLED SUBSTANCE AGREEMENT (below)       Patient-Level CSA:    There are no patient-level csa.         Amount of exercise or physical activity: as tolerated    Problems taking medications regularly: No    Medication side effects: none    Diet: regular (no restrictions)        Problem list and histories reviewed & adjusted, as indicated.  Additional history: as documented    Patient Active Problem List   Diagnosis     Chronic pain     Depression, unspecified depression type     Essential hypertension     Cerebrovascular accident (CVA), unspecified mechanism (H)     Primary insomnia     Cervicalgia     Tobacco dependence syndrome     ASCUS with positive high risk  HPV cervical     No past surgical history on file.    Social History     Tobacco Use     Smoking status: Current Every Day Smoker     Smokeless tobacco: Never Used   Substance Use Topics     Alcohol use: No     No family history on file.      Current Outpatient Medications   Medication Sig Dispense Refill     amLODIPine (NORVASC) 10 MG tablet TAKE 1 TABLET(10 MG) BY MOUTH DAILY 90 tablet 3     atorvastatin (LIPITOR) 80 MG tablet Take 1 tablet (80 mg) by mouth At Bedtime 30 tablet 3     citalopram (CELEXA) 20 MG tablet Take 1 tablet (20 mg) by mouth daily 30 tablet 11     Cyanocobalamin (VITAMIN B-12 CR) 1000 MCG TBCR TAKE 1 TABLET BY MOUTH DAILY 90 tablet 1     cyclobenzaprine (FLEXERIL) 10 MG tablet Take 1 tablet (10 mg) by mouth daily 42 tablet 0     DULoxetine (CYMBALTA) 60 MG capsule TAKE 1 CAPSULE(60 MG) BY MOUTH DAILY 30 capsule 5     fa-pyridoxine-cyancobalamin 2.5-25-2 MG TABS per tablet Take 1 tablet by mouth daily 30 each 3     gabapentin (NEURONTIN) 300 MG capsule TAKE 4 CAPSULES(1200 MG) BY MOUTH THREE TIMES DAILY 360 capsule 0     HYDROcodone-acetaminophen (NORCO)  MG per tablet Take 1 tablet by mouth every 4 hours as needed for moderate to severe pain 180 tablet 0     losartan (COZAAR) 100 MG tablet TAKE 1 TABLET(100 MG) BY MOUTH DAILY 90 tablet 3     metoprolol tartrate (LOPRESSOR) 100 MG tablet TAKE 1 TABLET(100 MG) BY MOUTH DAILY 90 tablet 3     naproxen (NAPROSYN) 500 MG tablet TAKE 1 TABLET(500 MG) BY MOUTH TWICE DAILY 60 tablet 3     nicotine (NICODERM CQ) 21 MG/24HR 24 hr patch Place 1 patch onto the skin every 24 hours 30 patch 3     nicotine (NICODERM CQ) 21 MG/24HR 24 hr patch Place 1 patch onto the skin every 24 hours 30 patch 3     omeprazole (PRILOSEC) 20 MG CR capsule Take 1 capsule (20 mg) by mouth daily 90 capsule 3     oxyCODONE (OXYCONTIN) 15 MG 12 hr tablet Take 20 mg by mouth every 12 hours   0     VITAMIN D3 1000 units tablet TAKE 1 TABLET BY MOUTH DAILY 30 tablet 3      zolpidem (AMBIEN) 5 MG tablet TAKE ONE TABLET BY MOUTH AT BEDTIME 30 tablet 5     Allergies   Allergen Reactions     Lisinopril Cough     Recent Labs   Lab Test 11/27/18  1512   LDL 35   HDL 52   TRIG 244*   ALT 24   CR 1.15*   GFRESTIMATED 50*   GFRESTBLACK 60*   POTASSIUM 4.6   TSH 0.97      BP Readings from Last 3 Encounters:   01/23/19 138/78   11/27/18 112/56   07/26/18 128/74    Wt Readings from Last 3 Encounters:   01/23/19 74.2 kg (163 lb 9.6 oz)   11/27/18 73.6 kg (162 lb 3.2 oz)   07/26/18 73.2 kg (161 lb 6.4 oz)                  Labs reviewed in EPIC    Reviewed and updated as needed this visit by clinical staff       Reviewed and updated as needed this visit by Provider         ROS:  Constitutional, HEENT, cardiovascular, pulmonary, GI, , musculoskeletal, neuro, skin, endocrine and psych systems are negative, except as otherwise noted.    OBJECTIVE:     /78   Pulse 83   Temp 98.3  F (36.8  C)   Resp 18   Wt 74.2 kg (163 lb 9.6 oz)   SpO2 99%   Breastfeeding? No   BMI 28.08 kg/m    Body mass index is 28.08 kg/m .  GENERAL: alert and no distress  EYES: Eyes grossly normal to inspection, PERRL and conjunctivae and sclerae normal  NECK: no adenopathy, no asymmetry, masses, or scars and thyroid normal to palpation  RESP: lungs clear to auscultation - no rales, rhonchi or wheezes  CV: regular rate and rhythm, normal S1 S2, no S3 or S4, no murmur, click or rub, no peripheral edema and peripheral pulses strong  ABDOMEN: soft, nontender, no hepatosplenomegaly, no masses and bowel sounds normal  MS: Cervical/thoracic surgical scar C/D/I, SLR negative bilaterally, sensation to touch and pressure intact, reflexes 2+, pedal pulses 3+, normal lower extremity strength  SKIN: no suspicious lesions or rashes  NEURO: Normal strength and tone, sensory exam grossly normal, mentation intact, speech normal, cranial nerves 2-12 intact and DTR's normal and symmetric bilaterally      ASSESSMENT/PLAN:          ICD-10-CM    1. Other chronic pain G89.29 PAIN MANAGEMENT REFERRAL   2. Uncomplicated opioid dependence (H) F11.20 PAIN MANAGEMENT REFERRAL   3. Tobacco dependence syndrome F17.200        53-year-old female presents with chronic back pain, had cervical spinal fusion back in 2014, scoliosis repair in 1981, following orthopedic, [facility].  Patient has been using chronic opioids for back pain control.  Physical examination unremarkable.  Past medical history significant for tobacco abuse, depression, alcohol abuse, cerebrovascular disease and hypertension.  Recommended physical therapy, acupuncture, massage therapy and continuing naproxen, gabapentin and duloxetine.  Suggested against using opioids considering side effects including but not limited to abuse, addiction, dependence, tolerance and respiratory depression.  Pain management referral placed, would suggest to consider Suboxone treatment as alternative to chronic opioids.  Written information provided.  All questions answered.          Patient Instructions     Patient Education     Back Care Tips    Caring for your back  These are things you can do to prevent a recurrence of acute back pain and to reduce symptoms from chronic back pain:    Maintain a healthy weight. If you are overweight, losing weight will help most types of back pain.    Exercise is an important part of recovery from most types of back pain. The muscles behind and in front of the spine support the back. This means strengthening both the back muscles and the abdominal muscles will provide better support for your spine.     Swimming and brisk walking are good overall exercises to improve your fitness level.    Practice safe lifting methods (below).    Practice good posture when sitting, standing and walking. Avoid prolonged sitting. This puts more stress on the lower back than standing or walking.    Wear quality shoes with sufficient  arch support. Foot and ankle alignment can affect back symptoms. Women should avoid wearing high heels.    Therapeutic massage can help relax the back muscles without stretching them.    During the first 24 to 72 hours after an acute injury or flare-up of chronic back pain, apply an ice pack to the painful area for 20 minutes and then remove it for 20 minutes, over a period of 60 to 90 minutes, or several times a day. As a safety precaution, do not use a heating pad at bedtime. Sleeping on a heating pad can lead to skin burns or tissue damage.    You can alternate ice and heat therapies.  Medicines  Talk to your healthcare provider before using medicines, especially if you have other medical problems or are taking other medicines.    You may use acetaminophen or ibuprofen to control pain, unless your healthcare provider prescribed other pain medicine. If you have chronic conditions like diabetes, liver or kidney disease, stomach ulcers, or gastrointestinal bleeding, or are taking blood thinners, talk with your healthcare provider before taking any medicines.    Be careful if you are given prescription pain medicines, narcotics, or medicine for muscle spasm. They can cause drowsiness, affect your coordination, reflexes, and judgment. Do not drive or operate heavy machinery while taking these types of medicines. Take prescription pain medicine only as prescribed by your healthcare provider.  Lumbar stretch  Here is a simple stretching exercise that will help relax muscle spasm and keep your back more limber. If exercise makes your back pain worse, don t do it.    Lie on your back with your knees bent and both feet on the ground.    Slowly raise your left knee to your chest as you flatten your lower back against the floor. Hold for 5 seconds.    Relax and repeat the exercise with your right knee.    Do 10 of these exercises for each leg.  Safe lifting method    Don t bend over at the waist to lift an object off the  floor.  Instead, bend your knees and hips in a squat.     Keep your back and head upright    Hold the object close to your body, directly in front of you.    Straighten your legs to lift the object.     Lower the object to the floor in the reverse fashion.    If you must slide something across the floor, push it.  Posture tips  Sitting  Sit in chairs with straight backs or low-back support. Keep your knees lower than your hips, with your feet flat on the floor.  When driving, sit up straight. Adjust the seat forward so you are not leaning toward the steering wheel.  A small pillow or rolled towel behind your lower back may help if you are driving long distances.   Standing  When standing for long periods, shift most of your weight to one leg at a time. Alternate legs every few minutes.   Sleeping  The best way to sleep is on your side with your knees bent. Put a low pillow under your head to support your neck in a neutral spine position. Avoid thick pillows that bend your neck to one side. Put a pillow between your legs to further relax your lower back. If you sleep on your back, put pillows under your knees to support your legs in a slightly flexed position. Use a firm mattress. If your mattress sags, replace it, or use a 1/2-inch plywood board under the mattress to add support.  Follow-up care  Follow up with your healthcare provider, or as advised.  If X-rays, a CT scan or an MRI scan were taken, they will be reviewed by a radiologist. You will be notified of any new findings that may affect your care.  Call 911  Call 911 if any of the following occur:    Trouble breathing    Confusion    Very drowsy    Fainting or loss of consciousness    Rapid or very slow heart rate    Loss of  bowel or bladder control  When to seek medical advice  Call your healthcare provider right away if any of the following occur:    Pain becomes worse or spreads to your arms or legs    Weakness or numbness in one or both arms or  legs    Numbness in the groin area  Date Last Reviewed: 6/1/2016 2000-2018 [employer]. [address] [place] [address], [place] [address]. All rights reserved. This information is not intended as a substitute for professional medical care. Always follow your healthcare professional's instructions.           Patient Education     Chronic Pain  Pain serves an important role. It lets you know something is wrong that needs your attention. When the body heals, pain normally goes away.  When pain lasts longer than 6 months, it is called  chronic  pain. This is pain that is present even after the body has healed. Chronic pain can cause mood problems and get in the way of your relationships and your daily life.  A number of conditions can cause chronic pain. Some of the more common include:    Previous surgery    An old injury    Infection    Diseases such as diabetes    Nerve damage    Back injury    Arthritis    Migraine or other headaches    Fibromyalgia    Cancer  Depression and stress can make chronic pain symptoms worse. In some cases, a cause for the pain can't be found.   Treatment  Treatment can greatly reduce pain. In many cases, pain can become less severe, occur less often, and interfere less with your daily life. Chronic pain is often treated with a combination of medicines, therapies, and lifestyle changes. You will work closely with your healthcare provider to find a treatment plan that works best for you.    Ask your healthcare provider for a referral to a pain management specialty center. These can provide the most recent and proven pain management strategies, along with emotional support and comprehensive services.    Several different types of medicines may be prescribed for chronic pain. Work with your healthcare provider to develop a medicine plan that helps manage your pain.    Physical therapy can help reduce certain types of chronic pain.    Occupational therapy teaches you how to do routine  tasks of daily living in ways that lessen your discomfort.    Counseling can help you cope better with stress and pain.    Other therapies such as meditation, yoga, biofeedback, massage, and acupuncture can also help manage chronic pain.    Changing certain habits can help reduce chronic pain. They include:  ? Eating healthy  ? Developing an exercise routine  ? Getting enough sleep   ? Stopping smoking and limiting alcohol use  ? Losing excess weight  Follow-up care  Follow up with your healthcare provider, or as advised. Let your healthcare provider know if your current treatment plan is working or if changes are needed.  Resources  For more information, contact:    American Headache and Migraine Association, lyndon.memberAvacen.WalkMe or 325-255-2876    American Chronic Pain Association, theacpa.org or 524-598-6228  Date Last Reviewed: 8/1/2017 2000-2018 [employer]. [address] [place] [address]. All rights reserved. This information is not intended as a substitute for professional medical care. Always follow your healthcare professional's instructions.           Patient Education     Managing Chronic Pain   Being in pain can be exhausting. You may find you have trouble working, sleeping, or just doing day-to-day tasks. But you can learn to manage pain, feel better, and regain control of your life.   Understanding chronic pain  Chronic pain is a serious medical problem. It is defined as pain that lasts longer than 3 months. Chronic pain includes pain that you feel regularly, even if it comes and goes. The pain may be from an ongoing injury or health problem. Or it may be because of a chronic pain syndrome, such as fibromyalgia. Sometimes pain persists when no cause can be found.   Pain should be treated  You have a right to have your pain treated. Untreated chronic pain can affect your overall health. It can lead to depression, anxiety, anger, and personality changes. It can also disrupt  work, sleep, relationships, and other aspects of normal life. It may not be possible to relieve all of your pain. But it can be reduced to a level you can cope with.   Your role in treatment  Your healthcare provider will work closely with you on a plan to manage your pain. But it s up to you to put this plan into action. Control of chronic pain is done mainly through self-management. This means that you take an active role in your care. Getting support from family and friends is important too.   Planning your treatment  Your healthcare provider will first look for a cause of your pain that can be treated. He or she will also assess your pain level. This may be done by asking you to rate your pain on a scale from 1 (low pain) to 10 (severe pain). Your provider will also ask you to describe the pain. For example, is your pain sharp or dull? Is it constant or does it come and go? You may be asked to keep a pain log. This is a diary in which you track your pain. It may help identify things that tend to make your pain worse. You and your healthcare provider can make a plan to help prevent and cope with pain on a daily basis. In some cases, you may be referred to a special pain program or clinic. Your treatment plan may include:    Medicines    Complementary therapies    Mind and body therapies    Other medical treatments    Getting physical activity   Medicines  Medicine will most likely be a part of your treatment plan. Your provider will evaluate which are the best medicines for your pain. You may use over-the-counter or prescription medicines. You may need to take more than one medicine. It may take some time to find the best medicine or combination of medicines for your pain. Take all medicines as directed. Pain medicines can be used in many ways. You may take medicines:    Every day to help   stay ahead  of the pain so that it doesn t flare up    At times when pain is worse than usual    Before activities that tend  to trigger pain    To decrease sensitivity to pain   Medicines for chronic pain include:    Nonsteroidal anti-inflammatory medicines (NSAIDs) for pain from swelling and inflammation. Your provider may prescribe a type of NSAID called a PELAYO inhibitor.    Acetaminophen    Anticonvulsants to treat nerve pain called neuropathy    Antidepressants to treat neuropathy    Muscle relaxants for muscle spasms    Topical medicines. These are put on the skin.    Opioids, or narcotics, to treat severe pain. These very strong medicines can help ease certain kinds of pain. They most often are used for short periods of time. Your provider will monitor your care very closely if you take opioids.   Complementary therapies  These are treatments that can be used along with medical care to help relieve pain. Look for a licensed practitioner with experience treating chronic pain. Talk with your healthcare provider about using complementary therapies such as:    Massage    Acupuncture and acupressure    Chiropractic    Vitamins or herbal supplements   Mind/body therapies  The brain and the body are both part of the pain response. The brain reads the pain signals from the body. This means that your mind has some control over how pain signals are processed. Mind/body therapies may help change how your brain reads pain signals. They may be learned with the help of a trained therapist or in a class. They include:    Deep breathing    Distraction    Visualization    Meditation    Biofeedback   Other medical treatments  If other treatments don t work for you, one of these procedures or devices may help:    Nerve blocks to numb nerves in a painful area    Trigger point injections for painful muscles    Steroid injections for joint pain     Transcutaneous electrical nerve stimulation (TENS) to block pain signals to the brain    Spinal stimulation to block spinal pain    Implanted spinal pump that contains pain medicine    Ablation using heat,  cold, or chemicals to destroy painful nerves                                                                                                                    Getting physical activity  Being physically active has many benefits. It can improve your ability to cope with pain. It may also help improve your mood, sleep, and overall health. Your healthcare provider can help you plan an exercise program that s right for your needs. This may include:    Stretching and range-of-motion exercises    Low-impact exercise such as walking, biking, swimming, and other water exercise    Strength training using light weights    Walking up the stairs instead of taking the elevator    Riding a bike instead of driving    Parking your car farther from your destination   You may need to not do high-impact activities. These involve jumping, running, or sudden starts, stops, or changes of direction. If you haven t exercised in a long time or you have physical limitations, your healthcare provider may refer you to a physical therapist. He or she can teach you stretches and exercises that fit your condition and fitness level.   Being active and healthy  A healthier lifestyle makes it easier to cope with pain and function better. Follow these tips:    Choose a balance of healthy foods and drinks.    Limit alcohol and caffeine.    Go to bed at about the same time each day.    Don t let pain keep you from others. Spend time with friends and family.    Keep your mind active. Read books or take classes.    If you re not working, volunteer or join a club or social group.   Getting support  A support group lets you talk with others who also have chronic pain. Chronic pain support groups can help you feel less isolated. They can also give you tips for coping with pain. To find a local support group, contact your nearest hospital or pain clinic.   You may also want to try counseling. Counseling can help you learn coping skills and methods such as  visualization. It can also help with mood problems. When choosing a counselor, look for someone who has worked with people who have chronic pain.   See your provider for regular visits and let him or her know how well treatments are working for you. Also reach out to family and friends for help and support.                              For more support and information, contact these groups:    American Academy of Pain Management, www.aapainmanage.org    American Academy of Pain Medicine, www.painmed.org    American Chronic Pain Association, www.theacpa.org    National Pain Foundation, www.thenationalpainfoundation.org  Date Last Reviewed: 12/1/2017 2000-2018 [employer]. [address] [place] [address], [place] [address]. All rights reserved. This information is not intended as a substitute for professional medical care. Always follow your healthcare professional's instructions.               Edvin Kapadia MD  [facility]

## 2019-01-23 NOTE — PATIENT INSTRUCTIONS
Patient Education     Back Care Tips    Caring for your back  These are things you can do to prevent a recurrence of acute back pain and to reduce symptoms from chronic back pain:    Maintain a healthy weight. If you are overweight, losing weight will help most types of back pain.    Exercise is an important part of recovery from most types of back pain. The muscles behind and in front of the spine support the back. This means strengthening both the back muscles and the abdominal muscles will provide better support for your spine.     Swimming and brisk walking are good overall exercises to improve your fitness level.    Practice safe lifting methods (below).    Practice good posture when sitting, standing and walking. Avoid prolonged sitting. This puts more stress on the lower back than standing or walking.    Wear quality shoes with sufficient arch support. Foot and ankle alignment can affect back symptoms. Women should avoid wearing high heels.    Therapeutic massage can help relax the back muscles without stretching them.    During the first 24 to 72 hours after an acute injury or flare-up of chronic back pain, apply an ice pack to the painful area for 20 minutes and then remove it for 20 minutes, over a period of 60 to 90 minutes, or several times a day. As a safety precaution, do not use a heating pad at bedtime. Sleeping on a heating pad can lead to skin burns or tissue damage.    You can alternate ice and heat therapies.  Medicines  Talk to your healthcare provider before using medicines, especially if you have other medical problems or are taking other medicines.    You may use acetaminophen or ibuprofen to control pain, unless your healthcare provider prescribed other pain medicine. If you have chronic conditions like diabetes, liver or kidney disease, stomach ulcers, or gastrointestinal bleeding, or are taking blood thinners, talk with your healthcare provider before taking any medicines.    Be careful  if you are given prescription pain medicines, narcotics, or medicine for muscle spasm. They can cause drowsiness, affect your coordination, reflexes, and judgment. Do not drive or operate heavy machinery while taking these types of medicines. Take prescription pain medicine only as prescribed by your healthcare provider.  Lumbar stretch  Here is a simple stretching exercise that will help relax muscle spasm and keep your back more limber. If exercise makes your back pain worse, don t do it.    Lie on your back with your knees bent and both feet on the ground.    Slowly raise your left knee to your chest as you flatten your lower back against the floor. Hold for 5 seconds.    Relax and repeat the exercise with your right knee.    Do 10 of these exercises for each leg.  Safe lifting method    Don t bend over at the waist to lift an object off the floor.  Instead, bend your knees and hips in a squat.     Keep your back and head upright    Hold the object close to your body, directly in front of you.    Straighten your legs to lift the object.     Lower the object to the floor in the reverse fashion.    If you must slide something across the floor, push it.  Posture tips  Sitting  Sit in chairs with straight backs or low-back support. Keep your knees lower than your hips, with your feet flat on the floor.  When driving, sit up straight. Adjust the seat forward so you are not leaning toward the steering wheel.  A small pillow or rolled towel behind your lower back may help if you are driving long distances.   Standing  When standing for long periods, shift most of your weight to one leg at a time. Alternate legs every few minutes.   Sleeping  The best way to sleep is on your side with your knees bent. Put a low pillow under your head to support your neck in a neutral spine position. Avoid thick pillows that bend your neck to one side. Put a pillow between your legs to further relax your lower back. If you sleep on your  back, put pillows under your knees to support your legs in a slightly flexed position. Use a firm mattress. If your mattress sags, replace it, or use a 1/2-inch plywood board under the mattress to add support.  Follow-up care  Follow up with your healthcare provider, or as advised.  If X-rays, a CT scan or an MRI scan were taken, they will be reviewed by a radiologist. You will be notified of any new findings that may affect your care.  Call 911  Call 911 if any of the following occur:    Trouble breathing    Confusion    Very drowsy    Fainting or loss of consciousness    Rapid or very slow heart rate    Loss of  bowel or bladder control  When to seek medical advice  Call your healthcare provider right away if any of the following occur:    Pain becomes worse or spreads to your arms or legs    Weakness or numbness in one or both arms or legs    Numbness in the groin area  Date Last Reviewed: 6/1/2016 2000-2018 [employer]. [address] [place] [address]. All rights reserved. This information is not intended as a substitute for professional medical care. Always follow your healthcare professional's instructions.           Patient Education     Chronic Pain  Pain serves an important role. It lets you know something is wrong that needs your attention. When the body heals, pain normally goes away.  When pain lasts longer than 6 months, it is called  chronic  pain. This is pain that is present even after the body has healed. Chronic pain can cause mood problems and get in the way of your relationships and your daily life.  A number of conditions can cause chronic pain. Some of the more common include:    Previous surgery    An old injury    Infection    Diseases such as diabetes    Nerve damage    Back injury    Arthritis    Migraine or other headaches    Fibromyalgia    Cancer  Depression and stress can make chronic pain symptoms worse. In some cases, a cause for the pain can't be  found.   Treatment  Treatment can greatly reduce pain. In many cases, pain can become less severe, occur less often, and interfere less with your daily life. Chronic pain is often treated with a combination of medicines, therapies, and lifestyle changes. You will work closely with your healthcare provider to find a treatment plan that works best for you.    Ask your healthcare provider for a referral to a pain management specialty center. These can provide the most recent and proven pain management strategies, along with emotional support and comprehensive services.    Several different types of medicines may be prescribed for chronic pain. Work with your healthcare provider to develop a medicine plan that helps manage your pain.    Physical therapy can help reduce certain types of chronic pain.    Occupational therapy teaches you how to do routine tasks of daily living in ways that lessen your discomfort.    Counseling can help you cope better with stress and pain.    Other therapies such as meditation, yoga, biofeedback, massage, and acupuncture can also help manage chronic pain.    Changing certain habits can help reduce chronic pain. They include:  ? Eating healthy  ? Developing an exercise routine  ? Getting enough sleep   ? Stopping smoking and limiting alcohol use  ? Losing excess weight  Follow-up care  Follow up with your healthcare provider, or as advised. Let your healthcare provider know if your current treatment plan is working or if changes are needed.  Resources  For more information, contact:    American Headache and Migraine Association, ahma.memberclicks.net or 224-065-1373    American Chronic Pain Association, theacpa.org or 985-436-5979  Date Last Reviewed: 8/1/2017 2000-2018 [employer]. [address] [place] [address], [place] [id number]. All rights reserved. This information is not intended as a substitute for professional medical care. Always follow your healthcare professional's  instructions.           Patient Education     Managing Chronic Pain   Being in pain can be exhausting. You may find you have trouble working, sleeping, or just doing day-to-day tasks. But you can learn to manage pain, feel better, and regain control of your life.   Understanding chronic pain  Chronic pain is a serious medical problem. It is defined as pain that lasts longer than 3 months. Chronic pain includes pain that you feel regularly, even if it comes and goes. The pain may be from an ongoing injury or health problem. Or it may be because of a chronic pain syndrome, such as fibromyalgia. Sometimes pain persists when no cause can be found.   Pain should be treated  You have a right to have your pain treated. Untreated chronic pain can affect your overall health. It can lead to depression, anxiety, anger, and personality changes. It can also disrupt work, sleep, relationships, and other aspects of normal life. It may not be possible to relieve all of your pain. But it can be reduced to a level you can cope with.   Your role in treatment  Your healthcare provider will work closely with you on a plan to manage your pain. But it s up to you to put this plan into action. Control of chronic pain is done mainly through self-management. This means that you take an active role in your care. Getting support from family and friends is important too.   Planning your treatment  Your healthcare provider will first look for a cause of your pain that can be treated. He or she will also assess your pain level. This may be done by asking you to rate your pain on a scale from 1 (low pain) to 10 (severe pain). Your provider will also ask you to describe the pain. For example, is your pain sharp or dull? Is it constant or does it come and go? You may be asked to keep a pain log. This is a diary in which you track your pain. It may help identify things that tend to make your pain worse. You and your healthcare provider can make a  plan to help prevent and cope with pain on a daily basis. In some cases, you may be referred to a special pain program or clinic. Your treatment plan may include:    Medicines    Complementary therapies    Mind and body therapies    Other medical treatments    Getting physical activity   Medicines  Medicine will most likely be a part of your treatment plan. Your provider will evaluate which are the best medicines for your pain. You may use over-the-counter or prescription medicines. You may need to take more than one medicine. It may take some time to find the best medicine or combination of medicines for your pain. Take all medicines as directed. Pain medicines can be used in many ways. You may take medicines:    Every day to help   stay ahead  of the pain so that it doesn t flare up    At times when pain is worse than usual    Before activities that tend to trigger pain    To decrease sensitivity to pain   Medicines for chronic pain include:    Nonsteroidal anti-inflammatory medicines (NSAIDs) for pain from swelling and inflammation. Your provider may prescribe a type of NSAID called a PELAYO inhibitor.    Acetaminophen    Anticonvulsants to treat nerve pain called neuropathy    Antidepressants to treat neuropathy    Muscle relaxants for muscle spasms    Topical medicines. These are put on the skin.    Opioids, or narcotics, to treat severe pain. These very strong medicines can help ease certain kinds of pain. They most often are used for short periods of time. Your provider will monitor your care very closely if you take opioids.   Complementary therapies  These are treatments that can be used along with medical care to help relieve pain. Look for a licensed practitioner with experience treating chronic pain. Talk with your healthcare provider about using complementary therapies such as:    Massage    Acupuncture and acupressure    Chiropractic    Vitamins or herbal supplements   Mind/body therapies  The brain and  the body are both part of the pain response. The brain reads the pain signals from the body. This means that your mind has some control over how pain signals are processed. Mind/body therapies may help change how your brain reads pain signals. They may be learned with the help of a trained therapist or in a class. They include:    Deep breathing    Distraction    Visualization    Meditation    Biofeedback   Other medical treatments  If other treatments don t work for you, one of these procedures or devices may help:    Nerve blocks to numb nerves in a painful area    Trigger point injections for painful muscles    Steroid injections for joint pain     Transcutaneous electrical nerve stimulation (TENS) to block pain signals to the brain    Spinal stimulation to block spinal pain    Implanted spinal pump that contains pain medicine    Ablation using heat, cold, or chemicals to destroy painful nerves                                                                                                                    Getting physical activity  Being physically active has many benefits. It can improve your ability to cope with pain. It may also help improve your mood, sleep, and overall health. Your healthcare provider can help you plan an exercise program that s right for your needs. This may include:    Stretching and range-of-motion exercises    Low-impact exercise such as walking, biking, swimming, and other water exercise    Strength training using light weights    Walking up the stairs instead of taking the elevator    Riding a bike instead of driving    Parking your car farther from your destination   You may need to not do high-impact activities. These involve jumping, running, or sudden starts, stops, or changes of direction. If you haven t exercised in a long time or you have physical limitations, your healthcare provider may refer you to a physical therapist. He or she can teach you stretches and exercises that  fit your condition and fitness level.   Being active and healthy  A healthier lifestyle makes it easier to cope with pain and function better. Follow these tips:    Choose a balance of healthy foods and drinks.    Limit alcohol and caffeine.    Go to bed at about the same time each day.    Don t let pain keep you from others. Spend time with friends and family.    Keep your mind active. Read books or take classes.    If you re not working, volunteer or join a club or social group.   Getting support  A support group lets you talk with others who also have chronic pain. Chronic pain support groups can help you feel less isolated. They can also give you tips for coping with pain. To find a local support group, contact your nearest hospital or pain clinic.   You may also want to try counseling. Counseling can help you learn coping skills and methods such as visualization. It can also help with mood problems. When choosing a counselor, look for someone who has worked with people who have chronic pain.   See your provider for regular visits and let him or her know how well treatments are working for you. Also reach out to family and friends for help and support.                              For more support and information, contact these groups:    American Academy of Pain Management, www.aapainmanage.org    American Academy of Pain Medicine, www.painmed.org    American Chronic Pain Association, www.theacpa.org    National Pain Foundation, www.thenationalpainfoundation.org  Date Last Reviewed: 12/1/2017 2000-2018 [employer]. [address] [place] [address], [place] [address]. All rights reserved. This information is not intended as a substitute for professional medical care. Always follow your healthcare professional's instructions.

## 2019-01-23 NOTE — NURSING NOTE
"Chief Complaint   Patient presents with     Pain       Initial Breastfeeding? No  Estimated body mass index is 27.84 kg/m  as calculated from the following:    Height as of 11/27/18: 1.626 m (5' 4\").    Weight as of 11/27/18: 73.6 kg (162 lb 3.2 oz).    Patient presents to the clinic using No DME    Health Maintenance that is potentially due pending provider review:  Colonoscopy/FIT    Will defer order to PCP.    Is there anyone who you would like to be able to receive your results? not asked  If yes have patient fill out MINGO    "

## 2019-01-28 DIAGNOSIS — M54.41 CHRONIC BILATERAL LOW BACK PAIN WITH RIGHT-SIDED SCIATICA: ICD-10-CM

## 2019-01-28 DIAGNOSIS — G89.4 CHRONIC PAIN SYNDROME: ICD-10-CM

## 2019-01-28 DIAGNOSIS — Z12.11 SPECIAL SCREENING FOR MALIGNANT NEOPLASMS, COLON: ICD-10-CM

## 2019-01-28 DIAGNOSIS — M54.2 CERVICALGIA: ICD-10-CM

## 2019-01-28 DIAGNOSIS — G89.29 CHRONIC BILATERAL LOW BACK PAIN WITH RIGHT-SIDED SCIATICA: ICD-10-CM

## 2019-01-28 DIAGNOSIS — K21.9 GASTROESOPHAGEAL REFLUX DISEASE WITHOUT ESOPHAGITIS: ICD-10-CM

## 2019-01-28 DIAGNOSIS — I10 ESSENTIAL HYPERTENSION: ICD-10-CM

## 2019-01-28 DIAGNOSIS — F17.200 TOBACCO DEPENDENCE SYNDROME: ICD-10-CM

## 2019-01-28 DIAGNOSIS — Z12.31 ENCOUNTER FOR SCREENING MAMMOGRAM FOR BREAST CANCER: ICD-10-CM

## 2019-01-28 DIAGNOSIS — F32.A DEPRESSION, UNSPECIFIED DEPRESSION TYPE: ICD-10-CM

## 2019-01-28 DIAGNOSIS — F51.01 PRIMARY INSOMNIA: ICD-10-CM

## 2019-01-28 DIAGNOSIS — H10.13 ALLERGIC CONJUNCTIVITIS, BILATERAL: ICD-10-CM

## 2019-01-28 RX ORDER — NAPROXEN 500 MG/1
500 TABLET ORAL 2 TIMES DAILY
Qty: 60 TABLET | Refills: 0 | Status: SHIPPED | OUTPATIENT
Start: 2019-01-28 | End: 2019-03-05

## 2019-01-28 NOTE — TELEPHONE ENCOUNTER
Requested Prescriptions   Pending Prescriptions Disp Refills     naproxen (NAPROSYN) 500 MG tablet 60 tablet 3     Sig: Take 1 tablet (500 mg) by mouth 2 times daily    There is no refill protocol information for this order        Last Written Prescription Date:  9/4/18  Last Fill Quantity: 60,  # refills: 3   Last office visit: 1/23/2019 with prescribing provider:  Kang Valdes Office Visit:   Next 5 appointments (look out 90 days)    Feb 11, 2019  3:20 PM CST  Office Visit with Paradise Leggett MD  [facility] ([facility]) [address] [place] [id number] [phone]

## 2019-01-30 ENCOUNTER — TRANSFERRED RECORDS (OUTPATIENT)
Dept: HEALTH INFORMATION MANAGEMENT | Facility: CLINIC | Age: 54
End: 2019-01-30

## 2019-01-30 ENCOUNTER — TELEPHONE (OUTPATIENT)
Dept: FAMILY MEDICINE | Facility: CLINIC | Age: 54
End: 2019-01-30

## 2019-01-30 DIAGNOSIS — G89.29 OTHER CHRONIC PAIN: Primary | ICD-10-CM

## 2019-01-30 LAB — PHQ9 SCORE: 8

## 2019-01-30 NOTE — TELEPHONE ENCOUNTER
Reason for Call: Request for an order or referral:    Order or referral being requested: Pt says Dr Kapadia referred her to  Pain Clinic. She wants this changed to go to [facility] in [place].    Date needed: as soon as possible    Has the patient been seen by the PCP for this problem? YES    Additional comments:      Phone number Patient can be reached at:  Home number on file [phone] (home)    Best Time:  anytime    Can we leave a detailed message on this number?  YES    Call taken on 1/30/2019 at 1:52 PM by Leticia Martinez

## 2019-02-01 ENCOUNTER — OFFICE VISIT (OUTPATIENT)
Dept: FAMILY MEDICINE | Facility: CLINIC | Age: 54
End: 2019-02-01
Payer: COMMERCIAL

## 2019-02-01 ENCOUNTER — TELEPHONE (OUTPATIENT)
Dept: PALLIATIVE MEDICINE | Facility: CLINIC | Age: 54
End: 2019-02-01

## 2019-02-01 VITALS
HEIGHT: 64 IN | TEMPERATURE: 98 F | SYSTOLIC BLOOD PRESSURE: 144 MMHG | BODY MASS INDEX: 28.27 KG/M2 | WEIGHT: 165.6 LBS | OXYGEN SATURATION: 98 % | DIASTOLIC BLOOD PRESSURE: 78 MMHG | HEART RATE: 84 BPM

## 2019-02-01 DIAGNOSIS — G89.4 CHRONIC PAIN SYNDROME: ICD-10-CM

## 2019-02-01 DIAGNOSIS — K04.7 DENTAL ABSCESS: Primary | ICD-10-CM

## 2019-02-01 DIAGNOSIS — M54.2 CERVICALGIA: ICD-10-CM

## 2019-02-01 LAB — ETHANOL UR QL SCN: NEGATIVE

## 2019-02-01 PROCEDURE — 80306 DRUG TEST PRSMV INSTRMNT: CPT | Performed by: FAMILY MEDICINE

## 2019-02-01 PROCEDURE — 80320 DRUG SCREEN QUANTALCOHOLS: CPT | Performed by: FAMILY MEDICINE

## 2019-02-01 PROCEDURE — 99214 OFFICE O/P EST MOD 30 MIN: CPT | Performed by: FAMILY MEDICINE

## 2019-02-01 RX ORDER — AMOXICILLIN 500 MG/1
500 CAPSULE ORAL 3 TIMES DAILY
Qty: 30 CAPSULE | Refills: 0 | Status: SHIPPED | OUTPATIENT
Start: 2019-02-01 | End: 2019-06-27

## 2019-02-01 ASSESSMENT — MIFFLIN-ST. JEOR: SCORE: 1351.16

## 2019-02-01 NOTE — TELEPHONE ENCOUNTER
Called to schedule New Eval and pt VM is full.    Jose David Farley  [facility]

## 2019-02-01 NOTE — NURSING NOTE
"Chief Complaint   Patient presents with     Pain       Initial /78 (BP Location: Right arm, Patient Position: Chair, Cuff Size: Adult Large)   Pulse 84   Temp 98  F (36.7  C) (Tympanic)   Ht 1.626 m (5' 4\")   Wt 75.1 kg (165 lb 9.6 oz)   SpO2 98%   BMI 28.43 kg/m   Estimated body mass index is 28.43 kg/m  as calculated from the following:    Height as of this encounter: 1.626 m (5' 4\").    Weight as of this encounter: 75.1 kg (165 lb 9.6 oz).    Patient presents to the clinic using No DME    Health Maintenance that is potentially due pending provider review:  Colonoscopy/FIT    Not addressed today.    Is there anyone who you would like to be able to receive your results? No  If yes have patient fill out MINGO    "

## 2019-02-01 NOTE — PROGRESS NOTES
SUBJECTIVE:   Anuradha Gray is a 53 year old female who presents to clinic today for the following health issues:      Chronic Pain Follow-Up       Type / Location of Pain: neck, right arm. Lower back  Analgesia/pain control:       Recent changes:  worse      Overall control: Inadequate pain control  Activity level/function:      Daily activities:  Unable to perform most daily activities - chores, hobbies, social activities, driving    Work:  not applicable  Adverse effects:  No  Adherance    Taking medication as directed?  Yes    Participating in other treatments: no -   Risk Factors:    Sleep:  Poor    Mood/anxiety:  worsened    Recent family or social stressors:  death in family:  Jordi passed away recently from influenza    Other aggravating factors: none  PHQ-9 SCORE 4/25/2018 9/19/2018 11/27/2018   PHQ-9 Total Score MyChart - - 9 (Mild depression)   PHQ-9 Total Score 5 15 9     [place]-7 SCORE 9/19/2018 10/24/2018 11/27/2018   Total Score - - 5 (mild anxiety)   Total Score 10 5 5     Encounter-Level CSA - 02/23/2018:    Controlled Substance Agreement - Scan on 3/5/2018 12:40 PM: CONTROLLED SUBSTANCE AGREEMENT (below)       Patient-Level CSA:    There are no patient-level csa.         Amount of exercise or physical activity: None    Problems taking medications regularly: No    Medication side effects: none    Diet: regular (no restrictions)    Pt has been to multiple pain clinics and has been most recently dismissed from pain clinic due to ETOH use   She states currently she is on a weaning protocol from them   She is here today wanting me to Rx pain meds at original doses     Mouth abcess.  Requesting antibiotic  Has dental appt coming up   Is losing most of her teeth      Problem list and histories reviewed & adjusted, as indicated.  Additional history: as documented    Labs reviewed in EPIC    Reviewed and updated as needed this visit by clinical staff  Tobacco  Allergies  Meds  Problems  Med Hx  Surg  "Hx  Fam Hx       Reviewed and updated as needed this visit by Provider  Tobacco  Allergies  Meds  Problems  Med Hx  Surg Hx  Fam Hx         ROS:  Constitutional, HEENT, cardiovascular, pulmonary, gi and gu systems are negative, except as otherwise noted.    OBJECTIVE:                                                    /78 (BP Location: Right arm, Patient Position: Chair, Cuff Size: Adult Large)   Pulse 84   Temp 98  F (36.7  C) (Tympanic)   Ht 1.626 m (5' 4\")   Wt 75.1 kg (165 lb 9.6 oz)   SpO2 98%   BMI 28.43 kg/m     Body mass index is 28.43 kg/m .  GENERAL APPEARANCE: healthy, alert and no distress  HENT: ear canals and TM's normal and dental caries noted and right upper incisor abscessed   RESP: lungs clear to auscultation - no rales, rhonchi or wheezes  CV: regular rates and rhythm, normal S1 S2, no S3 or S4 and no murmur, click or rub  PSYCH: mentation appears normal and affect normal/bright         ASSESSMENT/PLAN:                                                    1. Dental abscess  Need to get to [facility]  - amoxicillin (AMOXIL) 500 MG capsule; Take 1 capsule (500 mg) by mouth 3 times daily for 10 days  Dispense: 30 capsule; Refill: 0    2. Chronic pain syndrome    - Drug Abuse Screen Panel 13, Urine (Pain Care Package)  - Ethanol urine  - ADDICTION MEDICINE REFERRAL    Long discussion regarding pain meds and she is currently on a weaning protocol from [facility] pain clinic and if she would like me to take this over then I would need to be contacted by the pain clinic.   She has pain evaluation set up at [facility] pain clinic  I did recommend addiction medicine referral for options     I did tell her in no uncertain terms I would not Rx pain meds unless our pain clinic felt like she was an appropriate candidate for long term opioids.     Patient Instructions   Antibiotic for dental abscess    Urine test today         Addendum urine screen is positive for canabinoids   She did not disclose this use at " the visit. I notified her again that she would need complete evaluation prior to my prescribing and given her non disclosure of use of cannabis this was a concern.   Paradise Leggett MD  [facility]

## 2019-02-02 NOTE — TELEPHONE ENCOUNTER
gabapentin (NEURONTIN) 300 MG capsule      Last Written Prescription Date:  1/8/19  Last Fill Quantity: 360,   # refills: 0  Last Office Visit: 1965  Future Office visit:    Next 5 appointments (look out 90 days)    Feb 11, 2019  3:20 PM CST  Office Visit with Paradise Leggett MD  [facility] [address] [place] [id number]  [phone]           Routing refill request to provider for review/approval because:  Drug not on the [facility], [facility] or [facility] refill protocol or controlled substance

## 2019-02-04 ENCOUNTER — TELEPHONE (OUTPATIENT)
Dept: FAMILY MEDICINE | Facility: CLINIC | Age: 54
End: 2019-02-04

## 2019-02-04 DIAGNOSIS — G89.29 CHRONIC BILATERAL LOW BACK PAIN WITH RIGHT-SIDED SCIATICA: ICD-10-CM

## 2019-02-04 DIAGNOSIS — G89.4 CHRONIC PAIN SYNDROME: ICD-10-CM

## 2019-02-04 DIAGNOSIS — M54.2 CERVICALGIA: ICD-10-CM

## 2019-02-04 DIAGNOSIS — M54.41 CHRONIC BILATERAL LOW BACK PAIN WITH RIGHT-SIDED SCIATICA: ICD-10-CM

## 2019-02-04 LAB
AMPHETAMINES UR QL: NOT DETECTED NG/ML
BARBITURATES UR QL SCN: NOT DETECTED NG/ML
BENZODIAZ UR QL SCN: NOT DETECTED NG/ML
BUPRENORPHINE UR QL: NOT DETECTED NG/ML
CANNABINOIDS UR QL: ABNORMAL NG/ML
COCAINE UR QL SCN: NOT DETECTED NG/ML
D-METHAMPHET UR QL: NOT DETECTED NG/ML
METHADONE UR QL SCN: NOT DETECTED NG/ML
OPIATES UR QL SCN: ABNORMAL NG/ML
OXYCODONE UR QL SCN: NOT DETECTED NG/ML
PCP UR QL SCN: NOT DETECTED NG/ML
PROPOXYPH UR QL: NOT DETECTED NG/ML
TRICYCLICS UR QL SCN: NOT DETECTED NG/ML

## 2019-02-04 RX ORDER — OXYCODONE HYDROCHLORIDE 15 MG/1
20 TABLET, FILM COATED, EXTENDED RELEASE ORAL EVERY 12 HOURS
Status: CANCELLED | OUTPATIENT
Start: 2019-02-04

## 2019-02-04 RX ORDER — HYDROCODONE BITARTRATE AND ACETAMINOPHEN 10; 325 MG/1; MG/1
1 TABLET ORAL EVERY 4 HOURS PRN
Qty: 180 TABLET | Status: CANCELLED | OUTPATIENT
Start: 2019-02-04

## 2019-02-04 RX ORDER — GABAPENTIN 300 MG/1
CAPSULE ORAL
Qty: 360 CAPSULE | Refills: 0 | Status: SHIPPED | OUTPATIENT
Start: 2019-02-04 | End: 2019-03-03
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
## 2019-02-04 NOTE — TELEPHONE ENCOUNTER
Patient advised per Dr. Leggett that she will not be filling her pain meds as she does not have any notes from the pain clinic and she tested positive on Friday when she was here for marijuana. Patient states she uses her nephews marijuana at night as her nephew has medical marijuana as he has stage 4 cancer. Again was advised that Dr. Leggett will not be filling this for her. Patient kept trying to convince me to change Dr Leggett's mind. She also states that her blood pressure keeps creeping up due to the pain. Today was 158/101. Advised she should be seen for her blood pressure to help get that under control. Patient states she may be able to get back to town tomorrow and see Dr. Leggett to get pain meds, again explained she will not be filling those as Dr Leggett also told her this at her appointment on Friday. Advised she can go to the ER to see if they will give her some and to have her blood pressure evaluated.    Kirti Tenorio RN

## 2019-02-05 ENCOUNTER — TELEPHONE (OUTPATIENT)
Dept: ADDICTION MEDICINE | Facility: CLINIC | Age: 54
End: 2019-02-05

## 2019-02-06 ENCOUNTER — TRANSFERRED RECORDS (OUTPATIENT)
Dept: HEALTH INFORMATION MANAGEMENT | Facility: CLINIC | Age: 54
End: 2019-02-06

## 2019-02-06 NOTE — TELEPHONE ENCOUNTER
Pain Management Center Referral      1. Confirmed address with patient? Yes  2. Confirmed phone number with patient? Yes  3. Confirmed referring provider? Yes  4. Is the PCP the same as the referring provider? Yes  5. Has the patient been to any previous pain clinics? Yes [facility]  (If yes, send MINGO with welcome letter)  6. Which insurance are we to bill for this appointment?  [employer]    7. Informed pt of cancellation (48 hour) policy? Yes    REGARDING OPIOID MEDICATIONS: We will always address appropriateness of opioid pain medications, but we generally will not automatically take on a prescribing role. When we do take on prescribing of opioids for chronic pain, it is in collaboration with the referring physician for an intermediate period of time (months), with an expectation that the primary physician or provider will assume the prescribing role if medications are effective at stable doses with demonstrated compliance. Therefore, please do not assume that your prescribing responsibilities end on the day of pain clinic consultation.  8. Informed pt of prescribing policy? Yes    9.Please be aware that once you are established with a pain provider and location, you will need to continue have all future visits with that provider and location. It is best to determine what location is the most convenient for you and schedule with that one.    ** PATIENT INFORMED OF THIS POLICY Yes      9. Referring Provider: Paradise Leggett     10. Criteria for Triage Eval:   -Missed/Failed 1st DUAL appointment? N/A   -Medication Focused? N/A   -Mental Health Concerns? (e.g. Recent psych hospitalization/snap shot)? N/A   -Active substance abuse? N/A   -Patient behaviors (e.g. Offensive language/raised voice)? N/A    Anna ESTRADA    [facility]

## 2019-02-08 ENCOUNTER — OFFICE VISIT (OUTPATIENT)
Dept: PALLIATIVE MEDICINE | Facility: CLINIC | Age: 54
End: 2019-02-08
Attending: FAMILY MEDICINE
Payer: COMMERCIAL

## 2019-02-08 VITALS
WEIGHT: 164 LBS | HEIGHT: 64 IN | SYSTOLIC BLOOD PRESSURE: 124 MMHG | HEART RATE: 60 BPM | RESPIRATION RATE: 16 BRPM | DIASTOLIC BLOOD PRESSURE: 80 MMHG | OXYGEN SATURATION: 98 % | BODY MASS INDEX: 28 KG/M2

## 2019-02-08 DIAGNOSIS — M54.41 CHRONIC BILATERAL LOW BACK PAIN WITH RIGHT-SIDED SCIATICA: ICD-10-CM

## 2019-02-08 DIAGNOSIS — F19.20 CHEMICAL DEPENDENCY (H): ICD-10-CM

## 2019-02-08 DIAGNOSIS — G89.29 CHRONIC BILATERAL LOW BACK PAIN WITH RIGHT-SIDED SCIATICA: ICD-10-CM

## 2019-02-08 DIAGNOSIS — M96.1 FAILED BACK SYNDROME: Primary | ICD-10-CM

## 2019-02-08 DIAGNOSIS — M54.2 CERVICALGIA: ICD-10-CM

## 2019-02-08 DIAGNOSIS — G89.4 CHRONIC PAIN SYNDROME: ICD-10-CM

## 2019-02-08 PROCEDURE — 99214 OFFICE O/P EST MOD 30 MIN: CPT | Performed by: NURSE PRACTITIONER

## 2019-02-08 RX ORDER — HYDROCODONE BITARTRATE AND ACETAMINOPHEN 10; 325 MG/1; MG/1
1 TABLET ORAL EVERY 4 HOURS PRN
Qty: 180 TABLET | Refills: 0 | Status: CANCELLED | OUTPATIENT
Start: 2019-02-08

## 2019-02-08 RX ORDER — OXYCODONE HYDROCHLORIDE 15 MG/1
20 TABLET, FILM COATED, EXTENDED RELEASE ORAL EVERY 12 HOURS
Refills: 0 | Status: CANCELLED | OUTPATIENT
Start: 2019-02-08

## 2019-02-08 ASSESSMENT — MIFFLIN-ST. JEOR: SCORE: 1343.9

## 2019-02-08 ASSESSMENT — PAIN SCALES - GENERAL: PAINLEVEL: EXTREME PAIN (8)

## 2019-02-08 NOTE — TELEPHONE ENCOUNTER
Pt calling. She states previous pain clinic sent records to provider.  She has appointment with [facility] pain clnic in April. She needs meds to get through. CSS did tell her previous note states provider will not fill and that I do not see the records from the pain clinic.

## 2019-02-08 NOTE — TELEPHONE ENCOUNTER
Writer attempted to reach pt; no answer. LVM requesting a call back for an appt. Two more attempts will be made.     Regina Melgar  [facility]

## 2019-02-08 NOTE — PATIENT INSTRUCTIONS
After Visit Instructions:     Thank you for coming to [facility] for your care. It is my goal to partner with you to help you reach your optimal state of health.     Continue daily self-care, identifying contributing factors, and monitoring variations in pain level. Continue to integrate self-care into your life.      1. Ask Dr Leggett to refer you to Neurosurgery for second opinion on back surgery.   2. Schedule follow-up with ANGELA Davis, NP-C when you are ready to discuss multidisciplinary pain management   3. Referrals:   1. Appt with Addiction Medicine for consideration of Suboxone.       ANGELA Celis, NP-C  [facility]  [facility]  Clinic Number:  [phone]

## 2019-02-08 NOTE — TELEPHONE ENCOUNTER
Please schedule appointment for patient with Addiction Medicine provider for opioid use.  (has pain issues so Dr. Magana if available but if not ok for anyone).

## 2019-02-08 NOTE — TELEPHONE ENCOUNTER
FYI: Patient has an appt scheduled at 2:30 today at  Pain Center with me and had cancelled an appt for March. She does not have an appt for April scheduled at our clinic. I am not sure if she is planning to come to today's appt since she is asking for a n opiate refill from PCP.     Per Dr Holly Hakrins: Addiction Med referral has been processed and patient will be called to make an appt.     ANGELA Celis, NP-C  [facility]

## 2019-02-11 ENCOUNTER — OFFICE VISIT (OUTPATIENT)
Dept: FAMILY MEDICINE | Facility: CLINIC | Age: 54
End: 2019-02-11
Payer: COMMERCIAL

## 2019-02-11 VITALS
TEMPERATURE: 98.6 F | SYSTOLIC BLOOD PRESSURE: 132 MMHG | HEART RATE: 77 BPM | DIASTOLIC BLOOD PRESSURE: 86 MMHG | OXYGEN SATURATION: 96 % | WEIGHT: 163.6 LBS | BODY MASS INDEX: 28.08 KG/M2

## 2019-02-11 DIAGNOSIS — R87.610 ASCUS WITH POSITIVE HIGH RISK HPV CERVICAL: Primary | ICD-10-CM

## 2019-02-11 DIAGNOSIS — M54.2 CERVICALGIA: ICD-10-CM

## 2019-02-11 DIAGNOSIS — G89.4 CHRONIC PAIN SYNDROME: ICD-10-CM

## 2019-02-11 DIAGNOSIS — R87.810 ASCUS WITH POSITIVE HIGH RISK HPV CERVICAL: Primary | ICD-10-CM

## 2019-02-11 PROCEDURE — 57455 BIOPSY OF CERVIX W/SCOPE: CPT | Performed by: FAMILY MEDICINE

## 2019-02-11 PROCEDURE — 87624 HPV HI-RISK TYP POOLED RSLT: CPT | Performed by: FAMILY MEDICINE

## 2019-02-11 PROCEDURE — 88175 CYTOPATH C/V AUTO FLUID REDO: CPT | Performed by: FAMILY MEDICINE

## 2019-02-11 PROCEDURE — 96372 THER/PROPH/DIAG INJ SC/IM: CPT | Performed by: FAMILY MEDICINE

## 2019-02-11 PROCEDURE — 88305 TISSUE EXAM BY PATHOLOGIST: CPT | Performed by: FAMILY MEDICINE

## 2019-02-11 PROCEDURE — 99214 OFFICE O/P EST MOD 30 MIN: CPT | Mod: 25 | Performed by: FAMILY MEDICINE

## 2019-02-11 PROCEDURE — 88141 CYTOPATH C/V INTERPRET: CPT | Performed by: FAMILY MEDICINE

## 2019-02-11 RX ORDER — KETOROLAC TROMETHAMINE 30 MG/ML
30 INJECTION, SOLUTION INTRAMUSCULAR; INTRAVENOUS ONCE
Status: COMPLETED | OUTPATIENT
Start: 2019-02-11 | End: 2019-02-11

## 2019-02-11 RX ADMIN — KETOROLAC TROMETHAMINE 30 MG: 30 INJECTION, SOLUTION INTRAMUSCULAR; INTRAVENOUS at 16:35

## 2019-02-11 NOTE — NURSING NOTE
"Chief Complaint   Patient presents with     Colposcopy     Pain       Initial /86 (BP Location: Left leg, Patient Position: Chair, Cuff Size: Adult Large)   Pulse 77   Temp 98.6  F (37  C) (Tympanic)   Wt 74.2 kg (163 lb 9.6 oz)   SpO2 96%   BMI 28.08 kg/m   Estimated body mass index is 28.08 kg/m  as calculated from the following:    Height as of 2/8/19: 1.626 m (5' 4\").    Weight as of this encounter: 74.2 kg (163 lb 9.6 oz).    Patient presents to the clinic using No DME    Health Maintenance that is potentially due pending provider review:  Colonoscopy/FIT    Gave pt phone number/pended order to schedule mammo and/or colonoscopy(or FIT)    Is there anyone who you would like to be able to receive your results? No  If yes have patient fill out MINGO    "

## 2019-02-11 NOTE — PATIENT INSTRUCTIONS
I will call you with results in the next 5-7 days     May have some cramping and bleeding for a day or so.     Follow up with Pain clinic as set up already

## 2019-02-11 NOTE — NURSING NOTE
Prior to injection, verified patient identity using patient's name and date of birth.  Due to injection administration, patient instructed to remain in clinic for 15 minutes  afterwards, and to report any adverse reaction to me immediately.    Toradol 30 mg    Drug Amount Wasted:  None.  Vial/Syringe: Single dose vial  Expiration Date:  7/2020  Formerly [facility], [facility]–1983

## 2019-02-11 NOTE — TELEPHONE ENCOUNTER
Writer attempted to reach pt; no answer. LVM requesting a call back for an appt. One more attempt will be made.     Regina Melgar  [facility]

## 2019-02-11 NOTE — PROGRESS NOTES
SUBJECTIVE:   Anuradha Gray is a 53 year old female who presents to clinic today for the following health issues:      Colposcopy    Colposcopy/LEEP   Date/Time: 2/14/2019 8:31 AM   Performed by: Paradise Leggett MD   Authorized by: Paradise Leggett MD     Consent:     Consent obtained:  Written    Consent given by:  Patient    Procedural risks discussed:  Bleeding, possible continued pain, failure rate, infection and repeat procedure    Patient questions answered: yes      Patient agrees, verbalizes understanding, and wants to proceed: yes      Educational handouts given: yes      Instructions and paperwork completed: yes    Pre-procedure:     Premeds:  Ibuprofen    Prepped with: acetic acid    Indication:     Indication:  HPV and ASC-US    HPV Indications:  Other high risk  Procedure:     Procedure: Colposcopy w/ biopsy of cervix      Under satisfactory analgesia the patient was prepped and draped in the dorsal lithotomy position: yes      [place] speculum was placed in the vagina: yes      Under colposcopic examination the transition zone was seen in entirety: yes      Intracervical block was performed: no      Cervical biopsy performed with a cervical biopsy punch: yes      Tampon inserted: no      Monsel's solution was applied: yes      Biopsy(s): yes      Location:  3 oclock     Specimen to pathology: yes    Post-procedure:     Findings: Negative      Impression: Normal appearing cervix      Patient tolerance of procedure:  Patient tolerated the procedure well with no immediate complications          Chronic Pain Follow-Up       Type / Location of Pain: neck, right arm. Lower back  Analgesia/pain control:       Recent changes:  worse      Overall control: Inadequate pain control  Activity level/function:      Daily activities:  Unable to perform most daily activities - chores, hobbies, social activities, driving    Work:  Unable to work  Adverse effects:  No  Adherance    Taking medication as  directed?  No:     Participating in other treatments: yes  Risk Factors:    Sleep:  Poor    Mood/anxiety:  worsened    Recent family or social stressors:  death in family:  Niece passed away recently from influenza    Other aggravating factors: none  PHQ-9 SCORE 4/25/2018 9/19/2018 11/27/2018   PHQ-9 Total Score MyChart - - 9 (Mild depression)   PHQ-9 Total Score 5 15 9     [place]-7 SCORE 9/19/2018 10/24/2018 11/27/2018   Total Score - - 5 (mild anxiety)   Total Score 10 5 5     Encounter-Level CSA - 02/23/2018:    Controlled Substance Agreement - Scan on 3/5/2018 12:40 PM: CONTROLLED SUBSTANCE AGREEMENT (below)       Patient-Level CSA:    There are no patient-level csa.         Problem list and histories reviewed & adjusted, as indicated.  Additional history: as documented    Labs reviewed in EPIC    Reviewed and updated as needed this visit by clinical staff  Tobacco  Allergies  Meds  Problems  Med Hx  Surg Hx  Fam Hx  Soc Hx        Reviewed and updated as needed this visit by Provider  Tobacco  Allergies  Meds  Problems  Med Hx  Surg Hx  Fam Hx         ROS:  Constitutional, HEENT, cardiovascular, pulmonary, gi and gu systems are negative, except as otherwise noted.    OBJECTIVE:                                                    /86 (BP Location: Left leg, Patient Position: Chair, Cuff Size: Adult Large)   Pulse 77   Temp 98.6  F (37  C) (Tympanic)   Wt 74.2 kg (163 lb 9.6 oz)   SpO2 96%   BMI 28.08 kg/m     Body mass index is 28.08 kg/m .  GENERAL APPEARANCE: healthy, alert and no distress  RESP: lungs clear to auscultation - no rales, rhonchi or wheezes  CV: regular rates and rhythm, normal S1 S2, no S3 or S4 and no murmur, click or rub   (female): normal cervix, adnexae, and uterus without masses or discharge  PSYCH: mentation appears normal and affect normal/bright         ASSESSMENT/PLAN:                                                    1. ASCUS with positive high risk HPV  cervical    - HPV High Risk Types DNA Cervical  - Pap imaged thin layer diagnostic with HPV (select HPV order below)  - Surgical pathology exam    2. Cervicalgia    - ketorolac (TORADOL) injection 30 mg; Inject 1 mL (30 mg) into the muscle once    3. Chronic pain syndrome  Long discussion regarding pain and she recently saw pain clinic at [facility] and has appt with addiction med coming up. She has been on lots of narcotics in the past and dismissed from pain clinic for pos ETOH x2 and her urine screen for me last week was pos for cannabis.   She has many red flags for chronic opioid Rxing and I am not comfortable doing this for her and have indicated full eval by our pain clinic and addiction med eval and would have further conversations based on those evals          Patient Instructions   I will call you with results in the next 5-7 days     May have some cramping and bleeding for a day or so.     Follow up with Pain clinic as set up already         Risks, benefits, side effects and rationale for treatment plan fully discussed with the patient and understanding expressed.   Paradise Leggett MD  [facility]

## 2019-02-13 LAB — COPATH REPORT: NORMAL

## 2019-02-13 NOTE — TELEPHONE ENCOUNTER
Pt is scheduled w/ Dr. Magana on 2/18/19 @ 3:30. Closing encounter.     Regina Melgar  [facility]

## 2019-02-15 LAB
COPATH REPORT: ABNORMAL
PAP: ABNORMAL

## 2019-02-18 PROBLEM — R87.610 ASCUS WITH POSITIVE HIGH RISK HPV CERVICAL: Status: ACTIVE | Noted: 2018-11-27

## 2019-02-18 PROBLEM — R87.810 ASCUS WITH POSITIVE HIGH RISK HPV CERVICAL: Status: ACTIVE | Noted: 2018-11-27

## 2019-03-03 DIAGNOSIS — M54.2 CERVICALGIA: ICD-10-CM

## 2019-03-04 RX ORDER — GABAPENTIN 300 MG/1
CAPSULE ORAL
Qty: 360 CAPSULE | Refills: 0 | Status: SHIPPED | OUTPATIENT
Start: 2019-03-04

## 2019-03-04 NOTE — TELEPHONE ENCOUNTER
gabapentin (NEURONTIN) 300 MG capsule      Last Written Prescription Date:  2/4/18  Last Fill Quantity: 360,   # refills: 0  Last Office Visit: 2/11/18  Future Office visit:       Routing refill request to provider for review/approval because:  Drug not on the [facility], P or [facility] refill protocol or controlled substance

## 2019-03-05 DIAGNOSIS — F51.01 PRIMARY INSOMNIA: ICD-10-CM

## 2019-03-05 DIAGNOSIS — Z12.31 ENCOUNTER FOR SCREENING MAMMOGRAM FOR BREAST CANCER: ICD-10-CM

## 2019-03-05 DIAGNOSIS — M54.2 CERVICALGIA: ICD-10-CM

## 2019-03-05 DIAGNOSIS — Z12.11 SPECIAL SCREENING FOR MALIGNANT NEOPLASMS, COLON: ICD-10-CM

## 2019-03-05 DIAGNOSIS — I10 ESSENTIAL HYPERTENSION: ICD-10-CM

## 2019-03-05 DIAGNOSIS — H10.13 ALLERGIC CONJUNCTIVITIS, BILATERAL: ICD-10-CM

## 2019-03-05 DIAGNOSIS — G89.29 CHRONIC BILATERAL LOW BACK PAIN WITH RIGHT-SIDED SCIATICA: ICD-10-CM

## 2019-03-05 DIAGNOSIS — F17.200 TOBACCO DEPENDENCE SYNDROME: ICD-10-CM

## 2019-03-05 DIAGNOSIS — K21.9 GASTROESOPHAGEAL REFLUX DISEASE WITHOUT ESOPHAGITIS: ICD-10-CM

## 2019-03-05 DIAGNOSIS — G89.4 CHRONIC PAIN SYNDROME: ICD-10-CM

## 2019-03-05 DIAGNOSIS — F32.A DEPRESSION, UNSPECIFIED DEPRESSION TYPE: ICD-10-CM

## 2019-03-05 DIAGNOSIS — M54.41 CHRONIC BILATERAL LOW BACK PAIN WITH RIGHT-SIDED SCIATICA: ICD-10-CM

## 2019-03-05 RX ORDER — NAPROXEN 500 MG/1
TABLET ORAL
Qty: 60 TABLET | Refills: 0 | Status: SHIPPED | OUTPATIENT
Start: 2019-03-05 | End: 2019-04-02

## 2019-03-07 DIAGNOSIS — G89.29 CHRONIC BILATERAL LOW BACK PAIN WITH RIGHT-SIDED SCIATICA: ICD-10-CM

## 2019-03-07 DIAGNOSIS — H10.13 ALLERGIC CONJUNCTIVITIS, BILATERAL: ICD-10-CM

## 2019-03-07 DIAGNOSIS — Z12.31 ENCOUNTER FOR SCREENING MAMMOGRAM FOR BREAST CANCER: ICD-10-CM

## 2019-03-07 DIAGNOSIS — I10 ESSENTIAL HYPERTENSION: ICD-10-CM

## 2019-03-07 DIAGNOSIS — G89.4 CHRONIC PAIN SYNDROME: ICD-10-CM

## 2019-03-07 DIAGNOSIS — M54.41 CHRONIC BILATERAL LOW BACK PAIN WITH RIGHT-SIDED SCIATICA: ICD-10-CM

## 2019-03-07 DIAGNOSIS — M54.2 CERVICALGIA: ICD-10-CM

## 2019-03-07 DIAGNOSIS — Z12.11 SPECIAL SCREENING FOR MALIGNANT NEOPLASMS, COLON: ICD-10-CM

## 2019-03-07 DIAGNOSIS — F51.01 PRIMARY INSOMNIA: ICD-10-CM

## 2019-03-07 DIAGNOSIS — K21.9 GASTROESOPHAGEAL REFLUX DISEASE WITHOUT ESOPHAGITIS: ICD-10-CM

## 2019-03-07 DIAGNOSIS — F32.A DEPRESSION, UNSPECIFIED DEPRESSION TYPE: ICD-10-CM

## 2019-03-07 DIAGNOSIS — F17.200 TOBACCO DEPENDENCE SYNDROME: ICD-10-CM

## 2019-03-07 RX ORDER — ATORVASTATIN CALCIUM 80 MG/1
80 TABLET, FILM COATED ORAL AT BEDTIME
Qty: 30 TABLET | Refills: 3 | Status: SHIPPED | OUTPATIENT
Start: 2019-03-07 | End: 2019-07-05

## 2019-03-07 NOTE — TELEPHONE ENCOUNTER
"Requested Prescriptions   Pending Prescriptions Disp Refills     atorvastatin (LIPITOR) 80 MG tablet 30 tablet 3     Sig: Take 1 tablet (80 mg) by mouth At Bedtime    Statins Protocol Passed - 3/7/2019  9:52 AM       Passed - LDL on file in past 12 months    Recent Labs   Lab Test 11/27/18  1512   LDL 35            Passed - No abnormal creatine kinase in past 12 months    No lab results found.            Passed - Recent (12 mo) or future (30 days) visit within the authorizing provider's specialty    Patient had office visit in the last 12 months or has a visit in the next 30 days with authorizing provider or within the authorizing provider's specialty.  See \"Patient Info\" tab in inbasket, or \"Choose Columns\" in Meds & Orders section of the refill encounter.             Passed - Medication is active on med list       Passed - Patient is age 18 or older       Passed - No active pregnancy on record       Passed - No positive pregnancy test in past 12 months        Last Written Prescription Date:  3/8/18  Last Fill Quantity: 30,  # refills: 3   Last office visit: 2/11/2019 with prescribing provider:  Oleksandr   Future Office Visit:      "

## 2019-03-27 DIAGNOSIS — M54.2 CERVICALGIA: ICD-10-CM

## 2019-03-27 RX ORDER — GABAPENTIN 300 MG/1
CAPSULE ORAL
Qty: 360 CAPSULE | Refills: 5 | OUTPATIENT
Start: 2019-03-27

## 2019-03-27 NOTE — TELEPHONE ENCOUNTER
Requested Prescriptions   Pending Prescriptions Disp Refills     gabapentin (NEURONTIN) 300 MG capsule [Pharmacy Med Name: GABAPENTIN 300MG CAPSULES] 360 capsule      Sig: TAKE 4 CAPSULES(1200 MG) BY MOUTH THREE TIMES DAILY    There is no refill protocol information for this order        Last Written Prescription Date:  3/4/19  Last Fill Quantity: 360,  # refills: 0   Last office visit: 2/11/2019 with prescribing provider:     Future Office Visit:      PATIENT IS LEAVING TOWN IN A COUPLE OF HOURS. NEEDS ASAP

## 2019-03-27 NOTE — TELEPHONE ENCOUNTER
Patient notified, refill is denied per Dr. Leggett. Needs to follow up with pain clinic who has referred her to addiction medicine clinic

## 2019-04-02 ENCOUNTER — TRANSFERRED RECORDS (OUTPATIENT)
Dept: HEALTH INFORMATION MANAGEMENT | Facility: CLINIC | Age: 54
End: 2019-04-02

## 2019-04-02 DIAGNOSIS — K21.9 GASTROESOPHAGEAL REFLUX DISEASE WITHOUT ESOPHAGITIS: ICD-10-CM

## 2019-04-02 DIAGNOSIS — M54.41 CHRONIC BILATERAL LOW BACK PAIN WITH RIGHT-SIDED SCIATICA: ICD-10-CM

## 2019-04-02 DIAGNOSIS — G89.29 CHRONIC BILATERAL LOW BACK PAIN WITH RIGHT-SIDED SCIATICA: ICD-10-CM

## 2019-04-02 DIAGNOSIS — F51.01 PRIMARY INSOMNIA: ICD-10-CM

## 2019-04-02 DIAGNOSIS — M54.2 CERVICALGIA: ICD-10-CM

## 2019-04-02 DIAGNOSIS — F17.200 TOBACCO DEPENDENCE SYNDROME: ICD-10-CM

## 2019-04-02 DIAGNOSIS — Z12.11 SPECIAL SCREENING FOR MALIGNANT NEOPLASMS, COLON: ICD-10-CM

## 2019-04-02 DIAGNOSIS — I10 ESSENTIAL HYPERTENSION: ICD-10-CM

## 2019-04-02 DIAGNOSIS — Z12.31 ENCOUNTER FOR SCREENING MAMMOGRAM FOR BREAST CANCER: ICD-10-CM

## 2019-04-02 DIAGNOSIS — H10.13 ALLERGIC CONJUNCTIVITIS, BILATERAL: ICD-10-CM

## 2019-04-02 DIAGNOSIS — G89.4 CHRONIC PAIN SYNDROME: ICD-10-CM

## 2019-04-02 DIAGNOSIS — F32.A DEPRESSION, UNSPECIFIED DEPRESSION TYPE: ICD-10-CM

## 2019-04-02 RX ORDER — NAPROXEN 500 MG/1
TABLET ORAL
Qty: 60 TABLET | Refills: 0 | Status: SHIPPED | OUTPATIENT
Start: 2019-04-02 | End: 2019-05-02

## 2019-04-02 NOTE — TELEPHONE ENCOUNTER
"Requested Prescriptions   Pending Prescriptions Disp Refills     naproxen (NAPROSYN) 500 MG tablet [Pharmacy Med Name: NAPROXEN 500MG TABLETS] 60 tablet 0     Sig: TAKE 1 TABLET BY MOUTH TWICE DAILY    NSAID Medications Failed - 4/2/2019 10:12 AM       Failed - Normal CBC on file in past 12 months    Recent Labs   Lab Test 11/27/18  1512   WBC 12.4*   RBC 4.57   HGB 13.8   HCT 41.8                   Failed - Normal serum creatinine on file in past 12 months    Recent Labs   Lab Test 11/27/18  1512   CR 1.15*            Passed - Blood pressure under 140/90 in past 12 months    BP Readings from Last 3 Encounters:   02/11/19 132/86   02/08/19 124/80   02/01/19 144/78                Passed - Normal ALT on file in past 12 months    Recent Labs   Lab Test 11/27/18  1512   ALT 24            Passed - Normal AST on file in past 12 months    Recent Labs   Lab Test 11/27/18  1512   AST 20            Passed - Recent (12 mo) or future (30 days) visit within the authorizing provider's specialty    Patient had office visit in the last 12 months or has a visit in the next 30 days with authorizing provider or within the authorizing provider's specialty.  See \"Patient Info\" tab in inbasket, or \"Choose Columns\" in Meds & Orders section of the refill encounter.             Passed - Patient is age 6-64 years       Passed - Medication is active on med list       Passed - No active pregnancy on record       Passed - No positive pregnancy test in past 12 months        Last Written Prescription Date:  3/5/19  Last Fill Quantity: 60,  # refills: 0   Last office visit: 2/11/2019 with prescribing provider:     Future Office Visit:      "

## 2019-04-03 ENCOUNTER — TELEPHONE (OUTPATIENT)
Dept: FAMILY MEDICINE | Facility: CLINIC | Age: 54
End: 2019-04-03

## 2019-04-03 DIAGNOSIS — G89.4 CHRONIC PAIN SYNDROME: Primary | ICD-10-CM

## 2019-04-03 NOTE — TELEPHONE ENCOUNTER
Pt had concern that she was being labeled a drug addict because she was referred to the addiction med clinic for consideration of suboxone use  Very long discussion regarding why that referral was placed and what it meant.  Reviewed all of her notes and pain eval by Leeanne EUBANKS  Reviewed again with pt that given her history I wanted her to have an evaluation by pain for appropriateness of opioids vs suboxone   Pt is not happy that I am not willing to Rx opioids but given pos thc in urine and history of dismissal from pain clinic for ETOH I was not comfortable with this and told her that the very first visit that I had with her.   I reminded her of this and the resolution was that she would follow up with Leeanne EUBANKS as she had recommended. I also re reviewed with her the rationale for suboxone and referral to addiction med.   Paradise Leggett MD

## 2019-04-03 NOTE — TELEPHONE ENCOUNTER
4-3-2019 at 4:07PM    Previous P/F Blue Plus coverage must've . As of late today, chart reflected no coverage for P/F guarantor for outpatient/clinic appts. Clinic called patient for new insurance info, patient state she still has Blue [employer] through the [facility] and noted her other health care site searched (and found her insurance) using her . Clinic ran eligibility search through [employer], found new plan, added to chart and advised patient that whenever she receives the insurance card or info for her current/active insurance, to bring it to one of her [place] clinics to be scanned into her chart.        Minda Lancaster  Patient Representative  [facility]

## 2019-04-09 ENCOUNTER — OFFICE VISIT (OUTPATIENT)
Dept: PALLIATIVE MEDICINE | Facility: CLINIC | Age: 54
End: 2019-04-09
Payer: COMMERCIAL

## 2019-04-09 VITALS
DIASTOLIC BLOOD PRESSURE: 86 MMHG | SYSTOLIC BLOOD PRESSURE: 122 MMHG | BODY MASS INDEX: 27.81 KG/M2 | WEIGHT: 162 LBS | HEART RATE: 76 BPM

## 2019-04-09 DIAGNOSIS — Z76.5 DRUG-SEEKING BEHAVIOR: ICD-10-CM

## 2019-04-09 DIAGNOSIS — M96.1 FAILED BACK SYNDROME: Primary | ICD-10-CM

## 2019-04-09 DIAGNOSIS — M79.18 MYOFASCIAL MUSCLE PAIN: ICD-10-CM

## 2019-04-09 PROCEDURE — 99213 OFFICE O/P EST LOW 20 MIN: CPT | Performed by: NURSE PRACTITIONER

## 2019-04-09 ASSESSMENT — PAIN SCALES - GENERAL: PAINLEVEL: EXTREME PAIN (9)
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
## 2019-04-09 NOTE — Clinical Note
Dr Leggett, Please see my notes regarding Ms Gray's behavior in clinic this morning. Thank you, ANGELA Celis, NP-[facility]

## 2019-05-03 DIAGNOSIS — M54.2 CERVICALGIA: ICD-10-CM

## 2019-05-03 NOTE — TELEPHONE ENCOUNTER
Is this refill coming from the pharmacy or the patient. She has transferred her care I believe see note 4/17 and so should get meds from new MD

## 2019-05-03 NOTE — TELEPHONE ENCOUNTER
Requested Prescriptions   Pending Prescriptions Disp Refills     gabapentin (NEURONTIN) 300 MG capsule [Pharmacy Med Name: GABAPENTIN 300MG CAPSULES] 360 capsule 0     Sig: TAKE 4 CAPSULES(1200 MG) BY MOUTH THREE TIMES DAILY       There is no refill protocol information for this order      gabapentin (NEURONTIN) 300 MG capsule  Last Written Prescription Date:  03/04/2019  Last Fill Quantity: 360 capsule,  # refills: 0   Last office visit: 2/11/2019 with prescribing provider:  MICHELLE Leggett   Future Office Visit:      Yanely ROTH (MARVIN) (M)

## 2019-05-06 RX ORDER — GABAPENTIN 300 MG/1
CAPSULE ORAL
Qty: 360 CAPSULE | Refills: 0 | OUTPATIENT
Start: 2019-05-06

## 2019-05-29 DIAGNOSIS — Z12.11 SPECIAL SCREENING FOR MALIGNANT NEOPLASMS, COLON: ICD-10-CM

## 2019-05-29 DIAGNOSIS — G89.29 CHRONIC BILATERAL LOW BACK PAIN WITH RIGHT-SIDED SCIATICA: ICD-10-CM

## 2019-05-29 DIAGNOSIS — M54.2 CERVICALGIA: ICD-10-CM

## 2019-05-29 DIAGNOSIS — G89.4 CHRONIC PAIN SYNDROME: ICD-10-CM

## 2019-05-29 DIAGNOSIS — Z12.31 ENCOUNTER FOR SCREENING MAMMOGRAM FOR BREAST CANCER: ICD-10-CM

## 2019-05-29 DIAGNOSIS — F32.A DEPRESSION, UNSPECIFIED DEPRESSION TYPE: ICD-10-CM

## 2019-05-29 DIAGNOSIS — F51.01 PRIMARY INSOMNIA: ICD-10-CM

## 2019-05-29 DIAGNOSIS — F17.200 TOBACCO DEPENDENCE SYNDROME: ICD-10-CM

## 2019-05-29 DIAGNOSIS — M54.41 CHRONIC BILATERAL LOW BACK PAIN WITH RIGHT-SIDED SCIATICA: ICD-10-CM

## 2019-05-29 DIAGNOSIS — H10.13 ALLERGIC CONJUNCTIVITIS, BILATERAL: ICD-10-CM

## 2019-05-29 DIAGNOSIS — I10 ESSENTIAL HYPERTENSION: ICD-10-CM

## 2019-05-29 DIAGNOSIS — K21.9 GASTROESOPHAGEAL REFLUX DISEASE WITHOUT ESOPHAGITIS: ICD-10-CM

## 2019-05-29 RX ORDER — NAPROXEN 500 MG/1
TABLET ORAL
Qty: 60 TABLET | Refills: 0 | Status: SHIPPED | OUTPATIENT
Start: 2019-05-29 | End: 2020-07-02

## 2019-05-29 NOTE — TELEPHONE ENCOUNTER
"Requested Prescriptions   Pending Prescriptions Disp Refills     naproxen (NAPROSYN) 500 MG tablet [Pharmacy Med Name: NAPROXEN 500MG TABLETS] 60 tablet 0     Sig: TAKE 1 TABLET BY MOUTH TWICE DAILY       NSAID Medications Failed - 5/29/2019 10:12 AM        Failed - Normal CBC on file in past 12 months     Recent Labs   Lab Test 11/27/18  1512   WBC 12.4*   RBC 4.57   HGB 13.8   HCT 41.8                    Failed - Normal serum creatinine on file in past 12 months     Recent Labs   Lab Test 11/27/18  1512   CR 1.15*             Passed - Blood pressure under 140/90 in past 12 months     BP Readings from Last 3 Encounters:   04/09/19 122/86   02/11/19 132/86   02/08/19 124/80                 Passed - Normal ALT on file in past 12 months     Recent Labs   Lab Test 11/27/18  1512   ALT 24             Passed - Normal AST on file in past 12 months     Recent Labs   Lab Test 11/27/18  1512   AST 20             Passed - Recent (12 mo) or future (30 days) visit within the authorizing provider's specialty     Patient had office visit in the last 12 months or has a visit in the next 30 days with authorizing provider or within the authorizing provider's specialty.  See \"Patient Info\" tab in inbasket, or \"Choose Columns\" in Meds & Orders section of the refill encounter.              Passed - Patient is age 6-64 years        Passed - Medication is active on med list        Passed - No active pregnancy on record        Passed - No positive pregnancy test in past 12 months        Last Written Prescription Date:  5/2/19  Last Fill Quantity: 60,  # refills: 0   Last office visit: 2/11/2019 with prescribing provider:     Future Office Visit:      "

## 2019-06-27 ENCOUNTER — OFFICE VISIT (OUTPATIENT)
Dept: FAMILY MEDICINE | Facility: CLINIC | Age: 54
End: 2019-06-27
Payer: COMMERCIAL

## 2019-06-27 VITALS
HEIGHT: 64 IN | TEMPERATURE: 98.6 F | SYSTOLIC BLOOD PRESSURE: 108 MMHG | HEART RATE: 71 BPM | DIASTOLIC BLOOD PRESSURE: 62 MMHG | WEIGHT: 160 LBS | OXYGEN SATURATION: 98 % | BODY MASS INDEX: 27.31 KG/M2 | RESPIRATION RATE: 16 BRPM

## 2019-06-27 DIAGNOSIS — S32.029A CLOSED FRACTURE OF SECOND LUMBAR VERTEBRA, UNSPECIFIED FRACTURE MORPHOLOGY, INITIAL ENCOUNTER (H): Primary | ICD-10-CM

## 2019-06-27 PROCEDURE — 99213 OFFICE O/P EST LOW 20 MIN: CPT | Performed by: NURSE PRACTITIONER

## 2019-06-27 RX ORDER — HYDROCODONE BITARTRATE AND ACETAMINOPHEN 7.5; 325 MG/1; MG/1
TABLET ORAL
Refills: 0 | COMMUNITY
Start: 2019-06-25

## 2019-06-27 ASSESSMENT — MIFFLIN-ST. JEOR: SCORE: 1320.76

## 2019-06-27 NOTE — PATIENT INSTRUCTIONS
1.  Follow-up as scheduled with [facility] for pain management.  2.  DEXA scan is ordered, all you need to do is call [phone] and make an appointment at [facility] for testing.  3.  Follow-up with your neurosurgeon as scheduled.
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
## 2019-07-05 ENCOUNTER — NURSE TRIAGE (OUTPATIENT)
Dept: NURSING | Facility: CLINIC | Age: 54
End: 2019-07-05

## 2019-07-05 DIAGNOSIS — G89.4 CHRONIC PAIN SYNDROME: ICD-10-CM

## 2019-07-05 DIAGNOSIS — Z12.31 ENCOUNTER FOR SCREENING MAMMOGRAM FOR BREAST CANCER: ICD-10-CM

## 2019-07-05 DIAGNOSIS — G89.29 CHRONIC BILATERAL LOW BACK PAIN WITH RIGHT-SIDED SCIATICA: ICD-10-CM

## 2019-07-05 DIAGNOSIS — F32.A DEPRESSION, UNSPECIFIED DEPRESSION TYPE: ICD-10-CM

## 2019-07-05 DIAGNOSIS — I10 ESSENTIAL HYPERTENSION: ICD-10-CM

## 2019-07-05 DIAGNOSIS — H10.13 ALLERGIC CONJUNCTIVITIS, BILATERAL: ICD-10-CM

## 2019-07-05 DIAGNOSIS — M54.41 CHRONIC BILATERAL LOW BACK PAIN WITH RIGHT-SIDED SCIATICA: ICD-10-CM

## 2019-07-05 DIAGNOSIS — K21.9 GASTROESOPHAGEAL REFLUX DISEASE WITHOUT ESOPHAGITIS: ICD-10-CM

## 2019-07-05 DIAGNOSIS — F17.200 TOBACCO DEPENDENCE SYNDROME: ICD-10-CM

## 2019-07-05 DIAGNOSIS — F51.01 PRIMARY INSOMNIA: ICD-10-CM

## 2019-07-05 DIAGNOSIS — Z12.11 SPECIAL SCREENING FOR MALIGNANT NEOPLASMS, COLON: ICD-10-CM

## 2019-07-05 DIAGNOSIS — M54.2 CERVICALGIA: ICD-10-CM

## 2019-07-05 NOTE — TELEPHONE ENCOUNTER
"[id number] calling to say patient needs refills on:     Gabapentin  Lipitor  Lopressor  Losartan    She should still have a refill on her Lopressor and Losartan.     Her last prescription of Gabapentin was on June 5, 2019 last prescribed by Dr. Santana at a different clinic in [place].    Patient will have to address her Gabapentin and Lipitor when clinics open on Monday 7-8-19.      Esthela Silver RN  [facility]        Reason for Disposition    [1] Request for URGENT new prescription or refill of \"essential\" medication (i.e., likelihood of harm to patient if not taken) AND [2] triager unable to fill per unit policy    Protocols used: MEDICATION QUESTION CALL-A-AH      "

## 2019-07-05 NOTE — TELEPHONE ENCOUNTER
"Requested Prescriptions   Pending Prescriptions Disp Refills     losartan (COZAAR) 100 MG tablet [Pharmacy Med Name: LOSARTAN 100MG TABLETS] 90 tablet 0     Sig: TAKE 1 TABLET(100 MG) BY MOUTH DAILY       Angiotensin-II Receptors Failed - 7/5/2019  5:35 PM        Failed - Normal serum creatinine on file in past 12 months     Recent Labs   Lab Test 11/27/18  1512   CR 1.15*             Passed - Blood pressure under 140/90 in past 12 months     BP Readings from Last 3 Encounters:   06/27/19 108/62   04/09/19 122/86   02/11/19 132/86                 Passed - Recent (12 mo) or future (30 days) visit within the authorizing provider's specialty     Patient had office visit in the last 12 months or has a visit in the next 30 days with authorizing provider or within the authorizing provider's specialty.  See \"Patient Info\" tab in inbasket, or \"Choose Columns\" in Meds & Orders section of the refill encounter.              Passed - Medication is active on med list        Passed - Patient is age 18 or older        Passed - No active pregnancy on record        Passed - Normal serum potassium on file in past 12 months     Recent Labs   Lab Test 11/27/18  1512   POTASSIUM 4.6                    Passed - No positive pregnancy test in past 12 months        metoprolol tartrate (LOPRESSOR) 100 MG tablet [Pharmacy Med Name: METOPROLOL TARTRATE 100MG TABLETS] 90 tablet 0     Sig: TAKE 1 TABLET(100 MG) BY MOUTH DAILY       Beta-Blockers Protocol Passed - 7/5/2019  5:35 PM        Passed - Blood pressure under 140/90 in past 12 months     BP Readings from Last 3 Encounters:   06/27/19 108/62   04/09/19 122/86   02/11/19 132/86                 Passed - Patient is age 6 or older        Passed - Recent (12 mo) or future (30 days) visit within the authorizing provider's specialty     Patient had office visit in the last 12 months or has a visit in the next 30 days with authorizing provider or within the authorizing provider's specialty.  See " "\"Patient Info\" tab in inbasket, or \"Choose Columns\" in Meds & Orders section of the refill encounter.              Passed - Medication is active on med list        atorvastatin (LIPITOR) 80 MG tablet [Pharmacy Med Name: ATORVASTATIN 80MG TABLETS] 30 tablet 0     Sig: TAKE 1 TABLET(80 MG) BY MOUTH AT BEDTIME       Statins Protocol Passed - 7/5/2019  5:35 PM        Passed - LDL on file in past 12 months     Recent Labs   Lab Test 11/27/18  1512   LDL 35             Passed - No abnormal creatine kinase in past 12 months     No lab results found.             Passed - Recent (12 mo) or future (30 days) visit within the authorizing provider's specialty     Patient had office visit in the last 12 months or has a visit in the next 30 days with authorizing provider or within the authorizing provider's specialty.  See \"Patient Info\" tab in [facility], or \"Choose Columns\" in Meds & Orders section of the refill encounter.              Passed - Medication is active on med list        Passed - Patient is age 18 or older        Passed - No active pregnancy on record        Passed - No positive pregnancy test in past 12 months        losartan (COZAAR) 100 MG tablet  Last Written Prescription Date:  11/27/2018  Last Fill Quantity: 90 tablet,  # refills: 3   Last office visit: 6/27/2019 with prescribing provider:  SEAN Barrera   Future Office Visit:      metoprolol tartrate (LOPRESSOR) 100 MG tablet  Last Written Prescription Date:  11/27/2018  Last Fill Quantity: 90 tablet,  # refills: 3   Last office visit: 6/27/2019 with prescribing provider:  SEAN Barrera   Future Office Visit:      atorvastatin (LIPITOR) 80 MG tablet  Last Written Prescription Date:  03/07/2019  Last Fill Quantity: 30 tablet,  # refills: 3   Last office visit: 6/27/2019 with prescribing provider:  SEAN Barrera   Future Office Visit:      Yanely ROTH (MARVIN) (M)    "

## 2019-07-08 RX ORDER — ATORVASTATIN CALCIUM 80 MG/1
TABLET, FILM COATED ORAL
Qty: 30 TABLET | Refills: 0 | Status: SHIPPED | OUTPATIENT
Start: 2019-07-08

## 2019-07-08 RX ORDER — METOPROLOL TARTRATE 100 MG
TABLET ORAL
Qty: 90 TABLET | Refills: 0 | Status: SHIPPED | OUTPATIENT
Start: 2019-07-08

## 2019-07-08 RX ORDER — LOSARTAN POTASSIUM 100 MG/1
TABLET ORAL
Qty: 90 TABLET | Refills: 0 | Status: SHIPPED | OUTPATIENT
Start: 2019-07-08

## 2019-07-08 NOTE — TELEPHONE ENCOUNTER
Per chart review looks like there are refills on the Losartan and the Metoprolol. Called pharmacy and they state the medications are closed out as patient had them filled at different pharmacy. Resent medications, patient is due for Lipid medication review, reminder placed on pharmacy notes.    MICHAEL Madsen

## 2019-09-03 ENCOUNTER — TELEPHONE (OUTPATIENT)
Dept: FAMILY MEDICINE | Facility: CLINIC | Age: 54
End: 2019-09-03

## 2019-09-03 NOTE — TELEPHONE ENCOUNTER
Panel Management Review      Patient has the following on her problem list:     Depression / Dysthymia review    Measure:  Needs PHQ-9 score of 4 or less during index window.  Administer PHQ-9 and if score is 5 or more, send encounter to provider for next steps.    5 - 7 month window range: due    PHQ-9 SCORE 4/25/2018 9/19/2018 11/27/2018   PHQ-9 Total Score MyChart - - 9 (Mild depression)   PHQ-9 Total Score 5 15 9       If PHQ-9 recheck is 5 or more, route to provider for next steps.    Patient is due for:  PHQ9    Hypertension   Last three blood pressure readings:  BP Readings from Last 3 Encounters:   06/27/19 108/62   04/09/19 122/86   02/11/19 132/86     Blood pressure: Passed    HTN Guidelines:  Less than 140/90      Composite cancer screening  Chart review shows that this patient is due/due soon for the following Colonoscopy and Fecal Colorectal (FIT)  Summary:    Patient is due/failing the following:   COLONOSCOPY and FIT    Action needed:   Needs colonoscopy or FIT test    Type of outreach:    Phone, left message for patient to call back.     Questions for provider review:    None                                                                                                                                    Becky Horan CMA

## 2020-02-26 ENCOUNTER — TELEPHONE (OUTPATIENT)
Dept: FAMILY MEDICINE | Facility: CLINIC | Age: 55
End: 2020-02-26

## 2020-02-26 NOTE — TELEPHONE ENCOUNTER
Pt is past due for Pap follow up  Reminder letter has been sent  LMTC her clinic with any questions or to schedule    Lisa Rodas,   Pap Tracking

## 2020-07-02 DIAGNOSIS — F51.01 PRIMARY INSOMNIA: ICD-10-CM

## 2020-07-02 DIAGNOSIS — K21.9 GASTROESOPHAGEAL REFLUX DISEASE WITHOUT ESOPHAGITIS: ICD-10-CM

## 2020-07-02 DIAGNOSIS — Z12.11 SPECIAL SCREENING FOR MALIGNANT NEOPLASMS, COLON: ICD-10-CM

## 2020-07-02 DIAGNOSIS — I10 ESSENTIAL HYPERTENSION: ICD-10-CM

## 2020-07-02 DIAGNOSIS — F17.200 TOBACCO DEPENDENCE SYNDROME: ICD-10-CM

## 2020-07-02 DIAGNOSIS — M54.2 CERVICALGIA: ICD-10-CM

## 2020-07-02 DIAGNOSIS — G89.29 CHRONIC BILATERAL LOW BACK PAIN WITH RIGHT-SIDED SCIATICA: ICD-10-CM

## 2020-07-02 DIAGNOSIS — Z12.31 ENCOUNTER FOR SCREENING MAMMOGRAM FOR BREAST CANCER: ICD-10-CM

## 2020-07-02 DIAGNOSIS — M54.41 CHRONIC BILATERAL LOW BACK PAIN WITH RIGHT-SIDED SCIATICA: ICD-10-CM

## 2020-07-02 DIAGNOSIS — G89.4 CHRONIC PAIN SYNDROME: ICD-10-CM

## 2020-07-02 DIAGNOSIS — F32.A DEPRESSION, UNSPECIFIED DEPRESSION TYPE: ICD-10-CM

## 2020-07-02 DIAGNOSIS — H10.13 ALLERGIC CONJUNCTIVITIS, BILATERAL: ICD-10-CM

## 2020-07-03 RX ORDER — NAPROXEN 500 MG/1
TABLET ORAL
Qty: 0.01 TABLET | Refills: 0 | Status: SHIPPED | OUTPATIENT
Start: 2020-07-03